# Patient Record
Sex: MALE | Race: WHITE | NOT HISPANIC OR LATINO
[De-identification: names, ages, dates, MRNs, and addresses within clinical notes are randomized per-mention and may not be internally consistent; named-entity substitution may affect disease eponyms.]

---

## 2017-04-18 ENCOUNTER — APPOINTMENT (OUTPATIENT)
Dept: VASCULAR SURGERY | Facility: CLINIC | Age: 67
End: 2017-04-18
Payer: MEDICARE

## 2017-04-18 VITALS — SYSTOLIC BLOOD PRESSURE: 135 MMHG | HEART RATE: 91 BPM | OXYGEN SATURATION: 95 % | DIASTOLIC BLOOD PRESSURE: 78 MMHG

## 2017-04-18 PROCEDURE — 99214 OFFICE O/P EST MOD 30 MIN: CPT | Mod: 25

## 2017-04-18 PROCEDURE — 93978 VASCULAR STUDY: CPT

## 2017-06-05 ENCOUNTER — FORM ENCOUNTER (OUTPATIENT)
Age: 67
End: 2017-06-05

## 2017-06-06 ENCOUNTER — OUTPATIENT (OUTPATIENT)
Dept: OUTPATIENT SERVICES | Facility: HOSPITAL | Age: 67
LOS: 1 days | End: 2017-06-06
Payer: MEDICARE

## 2017-06-06 ENCOUNTER — APPOINTMENT (OUTPATIENT)
Dept: NEUROSURGERY | Facility: CLINIC | Age: 67
End: 2017-06-06

## 2017-06-06 VITALS
SYSTOLIC BLOOD PRESSURE: 137 MMHG | DIASTOLIC BLOOD PRESSURE: 89 MMHG | HEART RATE: 89 BPM | OXYGEN SATURATION: 95 % | WEIGHT: 215 LBS | BODY MASS INDEX: 32.58 KG/M2 | HEIGHT: 68 IN

## 2017-06-06 DIAGNOSIS — Z95.1 PRESENCE OF AORTOCORONARY BYPASS GRAFT: Chronic | ICD-10-CM

## 2017-06-06 PROCEDURE — 72100 X-RAY EXAM L-S SPINE 2/3 VWS: CPT

## 2017-06-06 PROCEDURE — 72100 X-RAY EXAM L-S SPINE 2/3 VWS: CPT | Mod: 26

## 2017-06-15 ENCOUNTER — FORM ENCOUNTER (OUTPATIENT)
Age: 67
End: 2017-06-15

## 2017-06-16 ENCOUNTER — OUTPATIENT (OUTPATIENT)
Dept: OUTPATIENT SERVICES | Facility: HOSPITAL | Age: 67
LOS: 1 days | End: 2017-06-16
Payer: MEDICARE

## 2017-06-16 DIAGNOSIS — Z95.1 PRESENCE OF AORTOCORONARY BYPASS GRAFT: Chronic | ICD-10-CM

## 2017-06-16 PROCEDURE — 72131 CT LUMBAR SPINE W/O DYE: CPT | Mod: 26

## 2017-06-16 PROCEDURE — 72131 CT LUMBAR SPINE W/O DYE: CPT

## 2017-06-20 VITALS
OXYGEN SATURATION: 98 % | WEIGHT: 199.96 LBS | TEMPERATURE: 98 F | RESPIRATION RATE: 16 BRPM | HEART RATE: 73 BPM | HEIGHT: 68 IN | SYSTOLIC BLOOD PRESSURE: 136 MMHG | DIASTOLIC BLOOD PRESSURE: 63 MMHG

## 2017-06-20 NOTE — PATIENT PROFILE ADULT. - PMH
Bladder cancer    Coronary artery disease    Hyperlipidemia    Hypertension    PAD (peripheral artery disease)    Prostate cancer Bladder cancer    Coronary artery disease    Hyperlipidemia    Hypertension    PAD (peripheral artery disease)    Prostate cancer  Radiation [place]

## 2017-06-20 NOTE — PATIENT PROFILE ADULT. - PSH
S/P CABG (coronary artery bypass graft) H/O laminectomy  x 4 yrs  S/P CABG (coronary artery bypass graft)  2003  Surgery, elective  Peripheral stents in both legs 2 -left, 1- right

## 2017-06-21 ENCOUNTER — APPOINTMENT (OUTPATIENT)
Dept: NEUROSURGERY | Facility: HOSPITAL | Age: 67
End: 2017-06-21

## 2017-06-21 ENCOUNTER — INPATIENT (INPATIENT)
Facility: HOSPITAL | Age: 67
LOS: 0 days | Discharge: HOME | DRG: 520 | End: 2017-06-22
Attending: NEUROLOGICAL SURGERY | Admitting: NEUROLOGICAL SURGERY
Payer: MEDICARE

## 2017-06-21 DIAGNOSIS — Z95.1 PRESENCE OF AORTOCORONARY BYPASS GRAFT: Chronic | ICD-10-CM

## 2017-06-21 DIAGNOSIS — Z41.9 ENCOUNTER FOR PROCEDURE FOR PURPOSES OTHER THAN REMEDYING HEALTH STATE, UNSPECIFIED: Chronic | ICD-10-CM

## 2017-06-21 DIAGNOSIS — Z98.890 OTHER SPECIFIED POSTPROCEDURAL STATES: Chronic | ICD-10-CM

## 2017-06-21 PROCEDURE — 63047 LAM FACETEC & FORAMOT LUMBAR: CPT | Mod: 80

## 2017-06-21 PROCEDURE — 63047 LAM FACETEC & FORAMOT LUMBAR: CPT

## 2017-06-21 RX ORDER — ATORVASTATIN CALCIUM 80 MG/1
40 TABLET, FILM COATED ORAL AT BEDTIME
Qty: 0 | Refills: 0 | Status: DISCONTINUED | OUTPATIENT
Start: 2017-06-21 | End: 2017-06-22

## 2017-06-21 RX ORDER — HYDROMORPHONE HYDROCHLORIDE 2 MG/ML
30 INJECTION INTRAMUSCULAR; INTRAVENOUS; SUBCUTANEOUS
Qty: 0 | Refills: 0 | Status: DISCONTINUED | OUTPATIENT
Start: 2017-06-21 | End: 2017-06-21

## 2017-06-21 RX ORDER — IBUPROFEN 200 MG
600 TABLET ORAL EVERY 8 HOURS
Qty: 0 | Refills: 0 | Status: DISCONTINUED | OUTPATIENT
Start: 2017-06-21 | End: 2017-06-22

## 2017-06-21 RX ORDER — ONDANSETRON 8 MG/1
4 TABLET, FILM COATED ORAL EVERY 6 HOURS
Qty: 0 | Refills: 0 | Status: DISCONTINUED | OUTPATIENT
Start: 2017-06-21 | End: 2017-06-22

## 2017-06-21 RX ORDER — HYDROMORPHONE HYDROCHLORIDE 2 MG/ML
0.5 INJECTION INTRAMUSCULAR; INTRAVENOUS; SUBCUTANEOUS
Qty: 0 | Refills: 0 | Status: DISCONTINUED | OUTPATIENT
Start: 2017-06-21 | End: 2017-06-21

## 2017-06-21 RX ORDER — BUPIVACAINE 13.3 MG/ML
20 INJECTION, SUSPENSION, LIPOSOMAL INFILTRATION ONCE
Qty: 0 | Refills: 0 | Status: DISCONTINUED | OUTPATIENT
Start: 2017-06-21 | End: 2017-06-22

## 2017-06-21 RX ORDER — CYCLOBENZAPRINE HYDROCHLORIDE 10 MG/1
5 TABLET, FILM COATED ORAL THREE TIMES A DAY
Qty: 0 | Refills: 0 | Status: DISCONTINUED | OUTPATIENT
Start: 2017-06-21 | End: 2017-06-22

## 2017-06-21 RX ORDER — ACETAMINOPHEN 500 MG
650 TABLET ORAL EVERY 6 HOURS
Qty: 0 | Refills: 0 | Status: DISCONTINUED | OUTPATIENT
Start: 2017-06-21 | End: 2017-06-22

## 2017-06-21 RX ORDER — NALOXONE HYDROCHLORIDE 4 MG/.1ML
0.1 SPRAY NASAL
Qty: 0 | Refills: 0 | Status: DISCONTINUED | OUTPATIENT
Start: 2017-06-21 | End: 2017-06-22

## 2017-06-21 RX ORDER — VALSARTAN 80 MG/1
320 TABLET ORAL DAILY
Qty: 0 | Refills: 0 | Status: DISCONTINUED | OUTPATIENT
Start: 2017-06-21 | End: 2017-06-22

## 2017-06-21 RX ORDER — ONDANSETRON 8 MG/1
4 TABLET, FILM COATED ORAL EVERY 6 HOURS
Qty: 0 | Refills: 0 | Status: DISCONTINUED | OUTPATIENT
Start: 2017-06-21 | End: 2017-06-21

## 2017-06-21 RX ORDER — ALPRAZOLAM 0.25 MG
1 TABLET ORAL DAILY
Qty: 0 | Refills: 0 | Status: DISCONTINUED | OUTPATIENT
Start: 2017-06-21 | End: 2017-06-22

## 2017-06-21 RX ORDER — CYCLOBENZAPRINE HYDROCHLORIDE 10 MG/1
5 TABLET, FILM COATED ORAL THREE TIMES A DAY
Qty: 0 | Refills: 0 | Status: DISCONTINUED | OUTPATIENT
Start: 2017-06-21 | End: 2017-06-21

## 2017-06-21 RX ORDER — NALOXONE HYDROCHLORIDE 4 MG/.1ML
0.1 SPRAY NASAL
Qty: 0 | Refills: 0 | Status: DISCONTINUED | OUTPATIENT
Start: 2017-06-21 | End: 2017-06-21

## 2017-06-21 RX ORDER — HYDROMORPHONE HYDROCHLORIDE 2 MG/ML
30 INJECTION INTRAMUSCULAR; INTRAVENOUS; SUBCUTANEOUS
Qty: 0 | Refills: 0 | Status: DISCONTINUED | OUTPATIENT
Start: 2017-06-21 | End: 2017-06-22

## 2017-06-21 RX ORDER — PANTOPRAZOLE SODIUM 20 MG/1
40 TABLET, DELAYED RELEASE ORAL
Qty: 0 | Refills: 0 | Status: DISCONTINUED | OUTPATIENT
Start: 2017-06-21 | End: 2017-06-22

## 2017-06-21 RX ORDER — DOCUSATE SODIUM 100 MG
100 CAPSULE ORAL THREE TIMES A DAY
Qty: 0 | Refills: 0 | Status: DISCONTINUED | OUTPATIENT
Start: 2017-06-21 | End: 2017-06-22

## 2017-06-21 RX ORDER — HYDROMORPHONE HYDROCHLORIDE 2 MG/ML
0.5 INJECTION INTRAMUSCULAR; INTRAVENOUS; SUBCUTANEOUS
Qty: 0 | Refills: 0 | Status: DISCONTINUED | OUTPATIENT
Start: 2017-06-21 | End: 2017-06-22

## 2017-06-21 RX ORDER — VANCOMYCIN HCL 1 G
1000 VIAL (EA) INTRAVENOUS ONCE
Qty: 0 | Refills: 0 | Status: COMPLETED | OUTPATIENT
Start: 2017-06-22 | End: 2017-06-22

## 2017-06-21 RX ORDER — SENNA PLUS 8.6 MG/1
2 TABLET ORAL AT BEDTIME
Qty: 0 | Refills: 0 | Status: DISCONTINUED | OUTPATIENT
Start: 2017-06-21 | End: 2017-06-22

## 2017-06-21 RX ORDER — MAGNESIUM HYDROXIDE 400 MG/1
30 TABLET, CHEWABLE ORAL EVERY 12 HOURS
Qty: 0 | Refills: 0 | Status: DISCONTINUED | OUTPATIENT
Start: 2017-06-21 | End: 2017-06-22

## 2017-06-21 RX ORDER — AMLODIPINE BESYLATE 2.5 MG/1
10 TABLET ORAL DAILY
Qty: 0 | Refills: 0 | Status: DISCONTINUED | OUTPATIENT
Start: 2017-06-21 | End: 2017-06-22

## 2017-06-21 RX ORDER — SODIUM CHLORIDE 9 MG/ML
1000 INJECTION INTRAMUSCULAR; INTRAVENOUS; SUBCUTANEOUS
Qty: 0 | Refills: 0 | Status: DISCONTINUED | OUTPATIENT
Start: 2017-06-21 | End: 2017-06-22

## 2017-06-21 RX ADMIN — Medication 600 MILLIGRAM(S): at 22:19

## 2017-06-21 RX ADMIN — Medication 100 MILLIGRAM(S): at 22:18

## 2017-06-21 RX ADMIN — SENNA PLUS 2 TABLET(S): 8.6 TABLET ORAL at 22:19

## 2017-06-21 RX ADMIN — SODIUM CHLORIDE 75 MILLILITER(S): 9 INJECTION INTRAMUSCULAR; INTRAVENOUS; SUBCUTANEOUS at 17:23

## 2017-06-21 RX ADMIN — Medication 600 MILLIGRAM(S): at 23:00

## 2017-06-21 RX ADMIN — HYDROMORPHONE HYDROCHLORIDE 30 MILLILITER(S): 2 INJECTION INTRAMUSCULAR; INTRAVENOUS; SUBCUTANEOUS at 17:30

## 2017-06-21 NOTE — H&P PST ADULT - ASSESSMENT
65 y/o male presenting with severe LBP and RLE with difficulty walking.  Here electively for L4-L5 laminectomy.    1)  consent signed by patient and attending in chart  2)  pre-op medical clearance intact  3)  will continue home meds for HTN, HLD  4)  pain management to follow post op  5)  mechanical DVT prophylaxis

## 2017-06-21 NOTE — CONSULT NOTE ADULT - SUBJECTIVE AND OBJECTIVE BOX
Pre-surgical Pain Inventory    Surgery: L45 lami    Surgeon: Dr. Alvarez    PAST MEDICAL & SURGICAL HISTORY:  Prostate cancer: Radiation [place]  Bladder cancer  Coronary artery disease  PAD (peripheral artery disease)  Hyperlipidemia  Hypertension  H/O laminectomy: x 4 yrs  Surgery, elective: Peripheral stents in both legs 2 -left, 1- right  S/P CABG (coronary artery bypass graft): 2003  H/O total cystectomy    Social Hx:   Pt is an active smoker 1ppd  Pt states he drinks socially, has not had a drink in 2-3mos  Pt denies illicit substances use  Pt is a retired     Current Pre-op Pain Medications  Pt denies current use of pain medications    Prescribed by:  Pain Management--[place]  --Bhavin    Past Pain Medication:   [X] Oxycodone  - Oxycontin 10 BID  [X] Hydrocodone  [] Codeine  - Tylenol #2   - Tylenol #3  [] Methadone  [X] Fentanyl  - 10 patches recently prescribed, pt denies current use--states minimally effective  [X] Dilaudid  [X] Morphine  [X] Tramadol  - Prescribed Tramadol XR   [] NSAIDs/Anti-inflammatories  - Motrin IB  - Ibuprofen  [] Medrol/Decadron  [] Muscle relaxants  --- Denies use  [] Lyrica/Gabapentin  -- Denies use    Allergies  penicillin (Hives)    Intolerance    Vital Signs:  Vital Signs Last 24 Hrs  T(C): 36.8, Max: 36.8 (06-20 @ 14:21)  T(F): 98.3, Max: 98.3 (06-20 @ 14:21)  HR: 73 (73 - 73)  BP: 136/63 (136/63 - 136/63)  BP(mean): --  RR: 16 (16 - 16)  SpO2: 98% (98% - 98%)    Labs:       REVIEW OF SYSTEMS:  CONSTITUTIONAL: No fever or fatigue  EYES: No eye pain, visual disturbances  ENMT:  No difficulty hearing, tinnitus, vertigo  NECK: No pain or stiffness  RESPIRATORY: No cough, wheezing; No shortness of breath  CARDIOVASCULAR: No chest pain. Triple bypass surgery in 2003. Extensive CV history. Pt states that he had a recent hx of angina workup, but was negative. Pt was thought to have some sort of sprain/strain.  GASTROINTESTINAL: No difficulties with constipation. No abdominal or epigastric pain. No nausea, vomiting, or hematemesis; No diarrhea or constipation. GENITOURINARY: No dysuria, frequency, hematuria, or incontinence  NEUROLOGICAL: Pt reports + hx of LBP and radiculopathy into b/l buttocks and legs. Pt reports associated weakness and muscle spasms in calves. Pt states that he recently had a fall while walking in the [place], this time his L leg, and has recently had progressive LOS in LE. States that neither leg is weaker, but that it waxes and wanes between legs. No headaches, memory loss, loss of strength in UE, numbness, or tremors at rest.   SKIN: No itching, burning, rashes, or lesions   MUSCULOSKELETAL: + LBP w/ radiculopathy.  PSYCHIATRIC: No depression, anxiety, mood swings, or difficulty sleeping    FUNCTIONAL ASSESSMENT:  Pt denies using walker/cane despite recent LOS. States he is too "proud." Pt reports high level of pain tolerance but that pain is persistent and debilitating.     AVERAGE DAILY PAIN SCORE: 7/10    PAIN ASSESSMENT:  Pt reports hx of LBP severe with sharp intermittent radicular pain into b/l buttocks and anterior thighs. Pt reports associated muscle cramping. Denies associated numbness/tingling/electric shock like sensation. States pain is alleviated at rest, stooped forward position and reports an associated shuffling gait. Pt reports symptoms of neurogenic claudication. Pt reports minimal relief of pain with pain medications, extensive hx of opioid trials as above.     PHYSICAL EXAM  GENERAL: NAD   HEAD:  Atraumatic, Normocephalic  EYES: EOMI, PERRLA, conjunctiva and sclera clear  NECK: Supple  NERVOUS SYSTEM:    Alert & Oriented X3, Good concentration;   Cranial nerves grossly intact  Motor Strength 5/5 B/L upper extremities, no restrictions in ROM, 5-/5 in LE b/l.   Sensation intact to LT in UE/LE in 3 dermatomes  Cervical: No facet tenderness. Negative Spurlings sign. Negative Reynoso's sign.   Lumbar: No lumbar tenderness. Negative straight leg raise. Negative crossed straight leg raise.   CHEST/LUNG: Clear to auscultation bilaterally; No rales, rhonchi, wheezing, or rubs  HEART: Regular rate and rhythm; No murmurs, rubs, or gallops  ABDOMEN: Soft, Nontender, Nondistended; Bowel sounds present  EXTREMITIES:  2+ Peripheral Pulses, No clubbing, cyanosis, or edema  SKIN: No rashes or lesions    Assessment:   67yo M w/ c/o LBP with radiculopathy and plan for L45 lami    Plan:   - As d/w Dr. Martínez Almaraz    Immediate post-op plan  - Given pts extensive cardiac hx, decision made to start on PCA for pre-emptive pain control, so as not to exacerbate CVS w/ elevated BP associated with poor pain control. Pt has extensive opioid hx--most recently was on Tramadol, would prefer not to restart given CV hx.     - PCA Settings:  	Opioid: Dilaudid  Initial Bolus: 0.0  	Initial Demand:0.2mg  	Lockout:8min  	Continuous Rate:0.0  	4 hour limit: 5mg  - Clinician bolus: 0.5mg q30min for Pain >8/10  - Flexeril 5mg TID PRN muscle spasms  	  Rescue plan  - Clinician bolus, as above    Tentative floor plan  - PO Percocet  - Flexeril  - IV Tylenol

## 2017-06-21 NOTE — H&P PST ADULT - HISTORY OF PRESENT ILLNESS
65 Y/O male presenting with LBP and RLE pain.  Ambulations is very painful. Has history of prior L3-L4 laminectomy.  He has failed conservative measures.  Denies bowel/bladder dysfunction.

## 2017-06-21 NOTE — H&P PST ADULT - PSH
H/O laminectomy  x 4 yrs  S/P CABG (coronary artery bypass graft)  2003  Surgery, elective  Peripheral stents in both legs 2 -left, 1- right

## 2017-06-21 NOTE — H&P PST ADULT - PMH
Bladder cancer    Coronary artery disease    Hyperlipidemia    Hypertension    PAD (peripheral artery disease)    Prostate cancer  Radiation [place]

## 2017-06-21 NOTE — PROGRESS NOTE ADULT - SUBJECTIVE AND OBJECTIVE BOX
Neurosurgery POC    S/P L4-5 laminectomy. no intra op complications. Patient is in PACU without complaint. Denies back , leg pain. Admits that RLE numbness is resolved.      T(C): 36.5, Max: 36.5 (06-21 @ 16:38)  HR: 80 (74 - 80)  BP: 114/70 (111/60 - 126/56)  RR: 16 (16 - 16)  SpO2: 97% (97% - 97%)  Wt(kg): --    Exam: A&O x 3, PERRL, EOMI  Lung: good air entry, clear lung sound    Heart: S1S2, regular  Back: surgical dressing is dry and clean, No edema, no hematoma.  Ext: no focal motor deficit. 5/5 x 4  No sensory deficit to touch.  Assessment: 66 male post L4-5 laminotomy Doing well.  Plan send to floor on call  OOB ambulate  Regular diet  Discharge tomorrow  D/W Dr. Alvarez.                  Wound:    Imaging:    Assessment/Plan:

## 2017-06-22 ENCOUNTER — TRANSCRIPTION ENCOUNTER (OUTPATIENT)
Age: 67
End: 2017-06-22

## 2017-06-22 VITALS
SYSTOLIC BLOOD PRESSURE: 151 MMHG | RESPIRATION RATE: 16 BRPM | HEART RATE: 90 BPM | OXYGEN SATURATION: 96 % | DIASTOLIC BLOOD PRESSURE: 81 MMHG | TEMPERATURE: 97 F

## 2017-06-22 LAB
ANION GAP SERPL CALC-SCNC: 14 MMOL/L — SIGNIFICANT CHANGE UP (ref 5–17)
BASOPHILS NFR BLD AUTO: 0.1 % — SIGNIFICANT CHANGE UP (ref 0–2)
BUN SERPL-MCNC: 10 MG/DL — SIGNIFICANT CHANGE UP (ref 7–23)
CALCIUM SERPL-MCNC: 9.4 MG/DL — SIGNIFICANT CHANGE UP (ref 8.4–10.5)
CHLORIDE SERPL-SCNC: 104 MMOL/L — SIGNIFICANT CHANGE UP (ref 96–108)
CO2 SERPL-SCNC: 22 MMOL/L — SIGNIFICANT CHANGE UP (ref 22–31)
CREAT SERPL-MCNC: 0.8 MG/DL — SIGNIFICANT CHANGE UP (ref 0.5–1.3)
EOSINOPHIL NFR BLD AUTO: 0.1 % — SIGNIFICANT CHANGE UP (ref 0–6)
GLUCOSE SERPL-MCNC: 114 MG/DL — HIGH (ref 70–99)
HCT VFR BLD CALC: 40.8 % — SIGNIFICANT CHANGE UP (ref 39–50)
HGB BLD-MCNC: 13.5 G/DL — SIGNIFICANT CHANGE UP (ref 13–17)
LYMPHOCYTES # BLD AUTO: 12.6 % — LOW (ref 13–44)
MCHC RBC-ENTMCNC: 30.6 PG — SIGNIFICANT CHANGE UP (ref 27–34)
MCHC RBC-ENTMCNC: 33.1 G/DL — SIGNIFICANT CHANGE UP (ref 32–36)
MCV RBC AUTO: 92.5 FL — SIGNIFICANT CHANGE UP (ref 80–100)
MONOCYTES NFR BLD AUTO: 6.9 % — SIGNIFICANT CHANGE UP (ref 2–14)
NEUTROPHILS NFR BLD AUTO: 80.3 % — HIGH (ref 43–77)
PLATELET # BLD AUTO: 171 K/UL — SIGNIFICANT CHANGE UP (ref 150–400)
POTASSIUM SERPL-MCNC: 4.4 MMOL/L — SIGNIFICANT CHANGE UP (ref 3.5–5.3)
POTASSIUM SERPL-SCNC: 4.4 MMOL/L — SIGNIFICANT CHANGE UP (ref 3.5–5.3)
RBC # BLD: 4.41 M/UL — SIGNIFICANT CHANGE UP (ref 4.2–5.8)
RBC # FLD: 13.6 % — SIGNIFICANT CHANGE UP (ref 10.3–16.9)
SODIUM SERPL-SCNC: 140 MMOL/L — SIGNIFICANT CHANGE UP (ref 135–145)
WBC # BLD: 9.1 K/UL — SIGNIFICANT CHANGE UP (ref 3.8–10.5)
WBC # FLD AUTO: 9.1 K/UL — SIGNIFICANT CHANGE UP (ref 3.8–10.5)

## 2017-06-22 RX ORDER — IBUPROFEN 200 MG
1 TABLET ORAL
Qty: 24 | Refills: 0 | OUTPATIENT
Start: 2017-06-22 | End: 2017-06-28

## 2017-06-22 RX ORDER — IBUPROFEN 200 MG
1 TABLET ORAL
Qty: 84 | Refills: 0 | OUTPATIENT
Start: 2017-06-22 | End: 2017-07-13

## 2017-06-22 RX ORDER — SENNOSIDES/DOCUSATE SODIUM 8.6MG-50MG
2 TABLET ORAL
Qty: 28 | Refills: 0 | OUTPATIENT
Start: 2017-06-22 | End: 2017-06-29

## 2017-06-22 RX ORDER — ACETAMINOPHEN 500 MG
2 TABLET ORAL
Qty: 0 | Refills: 0 | COMMUNITY
Start: 2017-06-22

## 2017-06-22 RX ORDER — OXYCODONE HYDROCHLORIDE 5 MG/1
1 TABLET ORAL
Qty: 28 | Refills: 0 | OUTPATIENT
Start: 2017-06-22 | End: 2017-06-29

## 2017-06-22 RX ORDER — IBUPROFEN 200 MG
600 TABLET ORAL EVERY 8 HOURS
Qty: 0 | Refills: 0 | Status: DISCONTINUED | OUTPATIENT
Start: 2017-06-22 | End: 2017-06-22

## 2017-06-22 RX ORDER — CYCLOBENZAPRINE HYDROCHLORIDE 10 MG/1
1 TABLET, FILM COATED ORAL
Qty: 21 | Refills: 0 | OUTPATIENT
Start: 2017-06-22 | End: 2017-06-29

## 2017-06-22 RX ORDER — OXYCODONE HYDROCHLORIDE 5 MG/1
1 TABLET ORAL
Qty: 0 | Refills: 0 | COMMUNITY

## 2017-06-22 RX ORDER — IBUPROFEN 200 MG
1 TABLET ORAL
Qty: 0 | Refills: 0 | COMMUNITY

## 2017-06-22 RX ORDER — ACETAMINOPHEN WITH CODEINE 300MG-30MG
1 TABLET ORAL
Qty: 0 | Refills: 0 | COMMUNITY

## 2017-06-22 RX ADMIN — PANTOPRAZOLE SODIUM 40 MILLIGRAM(S): 20 TABLET, DELAYED RELEASE ORAL at 06:28

## 2017-06-22 RX ADMIN — Medication 100 MILLIGRAM(S): at 06:26

## 2017-06-22 RX ADMIN — AMLODIPINE BESYLATE 10 MILLIGRAM(S): 2.5 TABLET ORAL at 06:26

## 2017-06-22 RX ADMIN — SODIUM CHLORIDE 75 MILLILITER(S): 9 INJECTION INTRAMUSCULAR; INTRAVENOUS; SUBCUTANEOUS at 05:46

## 2017-06-22 RX ADMIN — Medication 600 MILLIGRAM(S): at 06:28

## 2017-06-22 RX ADMIN — VALSARTAN 320 MILLIGRAM(S): 80 TABLET ORAL at 06:26

## 2017-06-22 RX ADMIN — Medication 250 MILLIGRAM(S): at 05:00

## 2017-06-22 NOTE — DISCHARGE NOTE ADULT - CARE PLAN
Principal Discharge DX:	H/O laminectomy  Goal:	return home and regain your independent activity  Instructions for follow-up, activity and diet:	diet: Regular  Activity as tolerated  No heavy lifting anything greater than 10 pounds  No bending or twisting  Able to  and get the incision wet, Pat it dry with a clean towel  Call the office if you develop temperature greater than 101.5 degrees F or drainage from the wound  Once you are home call the office to make a follow up appt with Dr Alvarez  Take motrin for pain and antiinflammatory for next 3 weeks and in between your pain reliever as needed Principal Discharge DX:	H/O laminectomy  Goal:	return home and regain your independent activity  Instructions for follow-up, activity and diet:	Diet: Regular  Activity as tolerated  No heavy lifting anything greater than 10 pounds  No bending or twisting  Able to  and get the incision wet, Pat it dry with a clean towel  Call the office if you develop temperature greater than 101.5 degrees F or drainage from the wound  Once you are home call the office to make a follow up appt with Dr Alvarez  Take motrin for pain and antiinflammatory for next 3 weeks and in between your pain reliever as needed

## 2017-06-22 NOTE — DISCHARGE NOTE ADULT - HOSPITAL COURSE
7 Y/O male presenting with LBP and RLE pain.  Ambulations is very painful. Has history of prior L3-L4 laminectomy.  He has failed conservative measures.    Denies bowel/bladder dysfunction. Pt admitted and underwent on 6/21/2017 Laminectomy    l4-l5   Past Medical History:  Bladder cancer    Coronary artery disease    Hyperlipidemia    Hypertension    PAD (peripheral artery disease)    Prostate cancer  Radiation [place].    Past Surgical History:  H/O laminectomy  x 4 yrs  S/P CABG (coronary artery bypass graft)  2003  Surgery, elective  Peripheral stents in both legs 2 -left, 1- right. 7 Y/O male with PMHx sig for Bladder Ca, CAD, HLP, HTN, PAD, Prostate Ca, Laminectomy, s/p CABG, Peripheral stents in both legs, with LBP and RLE pain.  Hx of painful ambulation, with severe radicular pain and weakness.  He failed conservative measures. Denied bowel/bladder dysfunction. Pt was admitted and underwent on 6/21/2017 Laminectomy. Pt was seen by PT. Pt was discharged home with _____. Pt is discharged on Ibuprofen for 3 weeks. 7 Y/O male with PMHx sig for Bladder Ca, CAD, HLP, HTN, PAD, Prostate Ca, Laminectomy, s/p CABG, Peripheral stents in both legs, with LBP and RLE pain.  Hx of painful ambulation, with severe radicular pain and weakness.  He failed conservative measures. Denied bowel/bladder dysfunction. Pt was admitted and underwent on 6/21/2017 Laminectomy. Pt was seen by PT. Pt was discharged home with no needs. Pt is discharged on Ibuprofen for 3 weeks.

## 2017-06-22 NOTE — DISCHARGE NOTE ADULT - PATIENT PORTAL LINK FT
“You can access the FollowHealth Patient Portal, offered by [facility], by registering with the following website: [email or web address]”

## 2017-06-22 NOTE — PROGRESS NOTE ADULT - SUBJECTIVE AND OBJECTIVE BOX
NEUROSURGERY PAIN MANAGEMENT CONSULT NOTE    **INCOMPLETE NOTE**    HPI:  65 Y/O male presenting with LBP and RLE pain.  Ambulations is very painful. Has history of prior L3-L4 laminectomy.  He has failed conservative measures.  Denies bowel/bladder dysfunction. (21 Jun 2017 16:38)      PAST MEDICAL & SURGICAL HISTORY:  Prostate cancer: Radiation [place]  Bladder cancer  Coronary artery disease  PAD (peripheral artery disease)  Hyperlipidemia  Hypertension  H/O laminectomy: x 4 yrs  Surgery, elective: Peripheral stents in both legs 2 -left, 1- right  S/P CABG (coronary artery bypass graft): 2003  H/O total cystectomy      FAMILY HISTORY:      SOCIAL HISTORY:  [ ] Denies Smoking, Alcohol, or Drug Use    HOME MEDICATIONS:   Please refer to initial HNP    PAIN HOME MEDICATIONS:    Allergies    penicillin (Hives)    Intolerances        PAIN MEDICATIONS:  HYDROmorphone PCA (1 mG/mL) Rescue Clinician Bolus 0.5milliGRAM(s) IV Push every 30 minutes PRN  ondansetron Injectable 4milliGRAM(s) IV Push every 6 hours PRN  cyclobenzaprine 5milliGRAM(s) Oral three times a day PRN  ALPRAZolam 1milliGRAM(s) Oral daily PRN  acetaminophen   Tablet 650milliGRAM(s) Oral every 6 hours PRN  ondansetron Injectable 4milliGRAM(s) IV Push every 6 hours PRN  ibuprofen  Tablet 600milliGRAM(s) Oral every 8 hours  oxyCODONE  5 mG/acetaminophen 325 mG 2Tablet(s) Oral every 6 hours PRN  oxyCODONE  5 mG/acetaminophen 325 mG 1Tablet(s) Oral every 4 hours PRN    Heme:    Antibiotics:    Cardiovascular:  amLODIPine   Tablet 10milliGRAM(s) Oral daily  valsartan 320milliGRAM(s) Oral daily    GI:  pantoprazole    Tablet 40milliGRAM(s) Oral before breakfast  docusate sodium 100milliGRAM(s) Oral three times a day  magnesium hydroxide Suspension 30milliLiter(s) Oral every 12 hours PRN  senna 2Tablet(s) Oral at bedtime    Endocrine:  atorvastatin 40milliGRAM(s) Oral at bedtime    All Other Medications:  BUpivacaine liposome 1.3% Injectable (no eMAR) 20milliLiter(s) Local Injection once  sodium chloride 0.9%. 1000milliLiter(s) IV Continuous <Continuous>      Vital Signs Last 24 Hrs  T(C): 36.5, Max: 36.5 (06-21 @ 16:38)  T(F): 97.7, Max: 97.7 (06-21 @ 16:38)  HR: 77 (70 - 84)  BP: 157/90 (111/60 - 157/90)  BP(mean): --  RR: 16 (15 - 16)  SpO2: 98% (94% - 98%)    LABS:                RADIOLOGY:    Drug Screen:        REVIEW OF SYSTEMS:    CONSTITUTIONAL: No fever, weight loss, or fatigue  EYES: No eye pain, visual disturbances, or discharge  ENMT:  No difficulty hearing, tinnitus, vertigo; No sinus or throat pain  NECK: No pain or stiffness  BREASTS: No pain, masses, or nipple discharge  RESPIRATORY: No cough, wheezing, chills or hemoptysis; No shortness of breath  CARDIOVASCULAR: No chest pain, palpitations, dizziness, or leg swelling  GASTROINTESTINAL: No abdominal or epigastric pain. No nausea, vomiting, or hematemesis; No diarrhea or constipation. No melena or hematochezia.  GENITOURINARY: No dysuria, frequency, hematuria, or incontinence  NEUROLOGICAL: No headaches, memory loss, loss of strength, numbness, or tremors  SKIN: No itching, burning, rashes, or lesions   LYMPH NODES: No enlarged glands  ENDOCRINE: No heat or cold intolerance; No hair loss  MUSCULOSKELETAL: No joint pain or swelling; No muscle, back, or extremity pain  PSYCHIATRIC: No depression, anxiety, mood swings, or difficulty sleeping  HEME/LYMPH: No easy bruising, or bleeding gums  ALLERY AND IMMUNOLOGIC: No hives or eczema      FUNCTIONAL ASSESSMENT:  PAIN SCORE AT REST:         SCALE USED: (1-10 VNRS)  Comment:  PAIN SCORE WITH ACTIVITY:         SCALE USED: (1-10 VNRS)  Comment:    PAIN ASSESSMENT:    PHYSICAL EXAM  GENERAL: NAD, well-groomed, well-developed  HEAD:  Atraumatic, Normocephalic  EYES: EOMI, PERRLA, conjunctiva and sclera clear  NECK: Supple, ___ collar  NERVOUS SYSTEM:    Alert & Oriented X3, Good concentration;   Cranial nerves grossly intact  Motor Strength 5/5 B/L upper and lower extremities;   Sensation intact to LT in UE/LE in 3 dermatomes    Cervical: No facet tenderness. Negative Spurlings sign. Negative Reynoso's sign. Negative Brudzinski's sign. Negative Kernig's sign. Cervical ROM not assessed, s/p surgery and restricted turning.  ROM  Cervical flexion: 50  Right lateral flexion: 45  Left lateral flexion: 45  Extension: 60  Left rotation: 80  Right rotation: 80    Lumbar: No lumbar tenderness. Negative straight leg raise. Negative crossed straight leg raise. Negative David's sign. Negative facet loading maneuvers. Lumbar ROM not assessed, s/p surgery and restricted turning.  ROM  Trunk extension: 25  Trunk flexion: 90  Right rotation: 25  Left rotation: 25    CHEST/LUNG: Clear to auscultation bilaterally; No rales, rhonchi, wheezing, or rubs  HEART: Regular rate and rhythm; No murmurs, rubs, or gallops  ABDOMEN: Soft, Nontender, Nondistended; Bowel sounds present  EXTREMITIES:  2+ Peripheral Pulses, No clubbing, cyanosis, or edema  SKIN: No rashes or lesions      ASSESSMENT:       PLAN:   1. Opioids    Since yesterday 6am, pt has required:     PCA Setting:   - Opioids:   - Initial Bolus:   - Initial Demand:   - Lockout:   - Continuous Rate:   - 4 hour limit:     PLAN: C/w . Transition to . LA not required. Pt receiving adequete coverage. First step. For rescue dosing, will order .      2. Neuropathics  Pt currently denies neuropathic pain. No numbness/tingling/electric shock like sensation in LE/UE b.l.    PLAN: No indication for neuropathic agents.     3. Adjuvants  Pt ___ complaining of muscle spasms/cramping in neck/back. Tylenol 650mg q6h PRN for Mild Pain. Pt on/not on Percocet.     PLAN: C/w     4. Prophylactic:   Bowel regimen: Docusate Senna;    Nausea PRN: Zofran PRN for Nausea, pt does not c/o current    5. Functional Goals:   Pt will get OOB with PT today. Pt will resume previous level of activity without impairment from surgery.     6. Additional Consults:   None recommended.     7. Additional Labs/Imaging:   None recommended.     8. Follow up, Discharge Planning:   Patient is set for discharge to:   Discharge is pending:   Pain Management follow up plan: NEUROSURGERY PAIN MANAGEMENT CONSULT NOTE    PLAN:  - Pain well controlled, PCA was started for concern for poor pain control and hx of XR/Fentanyl patch etc. which pt decided to dc himself  - Switched to PO Percocet, pt walked without need for PT--cleared    HPI:  65 Y/O male presenting with LBP and RLE pain.  Ambulations is very painful. Has history of prior L3-L4 laminectomy.  He has failed conservative measures.  Denies bowel/bladder dysfunction. (21 Jun 2017 16:38)    PAST MEDICAL & SURGICAL HISTORY:  Prostate cancer: Radiation [place]  Bladder cancer  Coronary artery disease  PAD (peripheral artery disease)  Hyperlipidemia  Hypertension  H/O laminectomy: x 4 yrs  Surgery, elective: Peripheral stents in both legs 2 -left, 1- right  S/P CABG (coronary artery bypass graft): 2003  H/O total cystectomy    SOCIAL HISTORY:  [ ] Denies Smoking, Alcohol, or Drug Use    HOME MEDICATIONS:   Please refer to initial HNP    PAIN HOME MEDICATIONS:  See prior note    Allergies    penicillin (Hives)    Intolerances    PAIN MEDICATIONS:  HYDROmorphone PCA (1 mG/mL) Rescue Clinician Bolus 0.5milliGRAM(s) IV Push every 30 minutes PRN  ondansetron Injectable 4milliGRAM(s) IV Push every 6 hours PRN  cyclobenzaprine 5milliGRAM(s) Oral three times a day PRN  ALPRAZolam 1milliGRAM(s) Oral daily PRN  acetaminophen   Tablet 650milliGRAM(s) Oral every 6 hours PRN  ondansetron Injectable 4milliGRAM(s) IV Push every 6 hours PRN  ibuprofen  Tablet 600milliGRAM(s) Oral every 8 hours  oxyCODONE  5 mG/acetaminophen 325 mG 2Tablet(s) Oral every 6 hours PRN  oxyCODONE  5 mG/acetaminophen 325 mG 1Tablet(s) Oral every 4 hours PRN    Heme:    Antibiotics:    Cardiovascular:  amLODIPine   Tablet 10milliGRAM(s) Oral daily  valsartan 320milliGRAM(s) Oral daily    GI:  pantoprazole    Tablet 40milliGRAM(s) Oral before breakfast  docusate sodium 100milliGRAM(s) Oral three times a day  magnesium hydroxide Suspension 30milliLiter(s) Oral every 12 hours PRN  senna 2Tablet(s) Oral at bedtime    Endocrine:  atorvastatin 40milliGRAM(s) Oral at bedtime    All Other Medications:  BUpivacaine liposome 1.3% Injectable (no eMAR) 20milliLiter(s) Local Injection once  sodium chloride 0.9%. 1000milliLiter(s) IV Continuous <Continuous>      Vital Signs Last 24 Hrs  T(C): 36.5, Max: 36.5 (06-21 @ 16:38)  T(F): 97.7, Max: 97.7 (06-21 @ 16:38)  HR: 77 (70 - 84)  BP: 157/90 (111/60 - 157/90)  BP(mean): --  RR: 16 (15 - 16)  SpO2: 98% (94% - 98%)    REVIEW OF SYSTEMS:  CONSTITUTIONAL: No fever, weight loss, or fatigue  EYES: No eye pain, visual disturbances  ENMT:  No difficulty hearing, tinnitus, vertigo  NECK: No pain or stiffness  RESPIRATORY: No cough, wheezing, chills or hemoptysis; No shortness of breath. Hx of smoking.  CARDIOVASCULAR: No chest pain, palpitations, dizziness w/ walking  GASTROINTESTINAL: Passing gas. No abdominal or epigastric pain. No nausea, vomiting, or hematemesis. No diarrhea.  GENITOURINARY: No dysuria, frequency, hematuria, or incontinence  NEUROLOGICAL: No headaches, memory loss, loss of strength, numbness, or tremors  SKIN: Dressing saturated. Changed dressing. No itching, burning.  MUSCULOSKELETAL: Minimal back discomfort. No joint pain or swelling; No muscle, back, or extremity pain. No return of pre-op radicular pain with walking/post-op.  PSYCHIATRIC: No difficulties sleeping      FUNCTIONAL ASSESSMENT:  PAIN SCORE AT REST:   Denies      SCALE USED: (1-10 VNRS)  PAIN SCORE WITH ACTIVITY:   Denies      SCALE USED: (1-10 VNRS)    PAIN ASSESSMENT:  Denies pain, states that he only feels some discomfort.     PHYSICAL EXAM  GENERAL: NAD, well-groomed, well-developed  HEAD:  Atraumatic, Normocephalic  EYES: EOMI, PERRLA, conjunctiva and sclera clear  NECK: Supple  NERVOUS SYSTEM:    Alert & Oriented X3, Good concentration;   Cranial nerves grossly intact  Motor Strength 5/5 B/L upper and lower extremities;   Sensation intact to LT in UE/LE in 3 dermatomes  Lumbar: No lumbar tenderness. Negative straight leg raise. Negative crossed straight leg raise.   EXTREMITIES:  2+ Peripheral Pulses.  SKIN: No rashes or lesions. Staples well approximated C/D/I. Dressing C/D/I--replaced today      ASSESSMENT:   67yo M s/p L45 lami POD 1    PLAN:   1. Opioids  Minimal use of PCA.     PLAN: Dc on Motrin 600mg q6h for 21 days. Percocet 5mg q6h PRN x 1 week. No need for additional medications. Pt sees Pain Management at [facility].    2. Neuropathics  Pt currently denies neuropathic pain. No numbness/tingling/electric shock like sensation in LE/UE b.l.    PLAN: No indication for neuropathic agents.     3. Adjuvants  Pt not complaining of muscle spasms/cramping in neck/back.     PLAN: Dcd with 1 week of Flexeril.    4. Prophylactic:   Bowel regimen: Docusate Senna; Dcd Docusate-Senna  Nausea PRN: Zofran PRN for Nausea, pt does not c/o current    5. Functional Goals:   Pt will get OOB with PT today. Pt will resume previous level of activity without impairment from surgery.     6. Additional Consults:   None recommended.     7. Additional Labs/Imaging:   None recommended.     8. Follow up, Discharge Planning:   Patient is set for discharge to: Home  Discharge is pending: Home  Pain Management follow up plan: No need for follow up.

## 2017-06-22 NOTE — DISCHARGE NOTE ADULT - MEDICATION SUMMARY - MEDICATIONS TO TAKE
I will START or STAY ON the medications listed below when I get home from the hospital:    acetaminophen 325 mg oral tablet  -- 2 tab(s) by mouth every 6 hours, As needed, For Temp greater than 38 C (100.4 F)  -- Indication: For Pain    acetaminophen-oxycodone 325 mg-5 mg oral tablet  -- 1 tab(s) by mouth every 6 hours, As Needed -for severe pain MDD:4  -- Indication: For Pain (Severe)    ibuprofen 600 mg oral tablet  -- 1 tab(s) by mouth every 6 hours  -- Indication: For Anti-inflammatory    Aspirin Enteric Coated 81 mg oral delayed release tablet  -- 1 tab(s) by mouth once a day  -- Start this on 6/23  -- Indication: For H/O triple bypass    atorvastatin 40 mg oral tablet  -- 1 tab(s) by mouth once a day (at bedtime)  -- Indication: For Hyperlipidemia    Exforge 10 mg-320 mg oral tablet  -- 1 tab(s) by mouth once a day  -- Indication: For Hypertension    Xanax 1 mg oral tablet  -- 1 tab(s) by mouth once a day, As Needed  -- Indication: For Anxiety    docusate-senna 50 mg-8.6 mg oral tablet  -- 2 tab(s) by mouth 2 times a day, As Needed -for constipation  -- Medication should be taken with plenty of water.    -- Indication: For Constipation    cyclobenzaprine 5 mg oral tablet  -- 1 tab(s) by mouth 3 times a day, As needed, Muscle Spasm  -- Indication: For Muscle Spams    NexIUM 40 mg oral delayed release capsule  -- 1 cap(s) by mouth once a day  -- Indication: For GERD

## 2017-06-22 NOTE — DISCHARGE NOTE ADULT - CARE PROVIDER_API CALL
Martínez Almaraz), Anesthesiology; Pain Medicine  [address] [address]  Phone: [phone]  Fax: [phone]    Patricio Alvarez), Neurological Surgery  [address] [address]  Phone: [phone]  Fax: [phone]

## 2017-06-22 NOTE — PROGRESS NOTE ADULT - SUBJECTIVE AND OBJECTIVE BOX
HPI:  67 Y/O male presenting with LBP and RLE pain.  Ambulations is very painful. Has history of prior L3-L4 laminectomy.  He has failed conservative measures.  Denies bowel/bladder dysfunction. (21 Jun 2017 16:38)    OVERNIGHT EVENTS: No acute events overnight. Pt denies acute weakness/loss of sensation of extremities, fevers.    Vital Signs Last 24 Hrs  T(C): 36.3, Max: 36.5 (06-21 @ 16:38)  T(F): 97.4, Max: 97.7 (06-21 @ 16:38)  HR: 90 (70 - 90)  BP: 151/81 (111/60 - 157/90)  BP(mean): --  RR: 16 (15 - 16)  SpO2: 96% (94% - 98%)    I&O's Summary  I & Os for 24h ending 22 Jun 2017 07:00  =============================================  IN: 1000 ml / OUT: 1000 ml / NET: 0 ml    I & Os for current day (as of 22 Jun 2017 09:51)  =============================================  IN: 75 ml / OUT: 0 ml / NET: 75 ml      PHYSICAL EXAM:  Neurological: AAOx3, FC, speech coherent  CNII-XII: grossly intact  Motor: MAEx4 5/5 UE and LE B/L         [x] warm well perfused; capillary refill <3 seconds   Sensation: [x] intact to light touch  [] decreased:   Incision/Wound: back incision site derssing saturated with serosanguinous fluid, staples in place, no erythema/edema    TUBES/LINES:  [] Griffin  [] Lumbar Drain  [] Wound Drains  [] Others    DIET:  [] NPO  [x] Mechanical  [] Tube feeds    LABS:                        13.5   9.1   )-----------( 171      ( 22 Jun 2017 07:11 )             40.8     06-22    140  |  104  |  10  ----------------------------<  114<H>  4.4   |  22  |  0.80    Ca    9.4      22 Jun 2017 07:11    CAPILLARY BLOOD GLUCOSE      Drug Levels: [] N/A    CSF Analysis: [] N/A      Allergies    penicillin (Hives)    Intolerances      MEDICATIONS:  Antibiotics:    Neuro:  HYDROmorphone PCA (1 mG/mL) Rescue Clinician Bolus 0.5milliGRAM(s) IV Push every 30 minutes PRN  ondansetron Injectable 4milliGRAM(s) IV Push every 6 hours PRN  cyclobenzaprine 5milliGRAM(s) Oral three times a day PRN  ALPRAZolam 1milliGRAM(s) Oral daily PRN  acetaminophen   Tablet 650milliGRAM(s) Oral every 6 hours PRN  ondansetron Injectable 4milliGRAM(s) IV Push every 6 hours PRN  oxyCODONE  5 mG/acetaminophen 325 mG 2Tablet(s) Oral every 6 hours PRN  oxyCODONE  5 mG/acetaminophen 325 mG 1Tablet(s) Oral every 4 hours PRN  ibuprofen  Tablet 600milliGRAM(s) Oral every 8 hours    Anticoagulation:    OTHER:  atorvastatin 40milliGRAM(s) Oral at bedtime  amLODIPine   Tablet 10milliGRAM(s) Oral daily  valsartan 320milliGRAM(s) Oral daily  pantoprazole    Tablet 40milliGRAM(s) Oral before breakfast  docusate sodium 100milliGRAM(s) Oral three times a day  magnesium hydroxide Suspension 30milliLiter(s) Oral every 12 hours PRN  senna 2Tablet(s) Oral at bedtime    IVF:  sodium chloride 0.9%. 1000milliLiter(s) IV Continuous <Continuous>    CULTURES:    RADIOLOGY & ADDITIONAL TESTS:    ASSESSMENT:  66y Male s/p L4-L5 laminectomy for lumbar stenosis, POD#1    PLAN:  -Pain management  -Bowel regimen  -DVT prophylaxsis: SCDS  -PT/OOB  -Diet regular  -Remove incision site dressing  -D/w Dr. Alvarez

## 2017-06-22 NOTE — PHYSICAL THERAPY INITIAL EVALUATION ADULT - PLANNED THERAPY INTERVENTIONS, PT EVAL
Patient is cleared from PT program. Demonstrated functional independence with all mobility. Patient in agreement.

## 2017-06-22 NOTE — DISCHARGE NOTE ADULT - PLAN OF CARE
return home and regain your independent activity diet: Regular  Activity as tolerated  No heavy lifting anything greater than 10 pounds  No bending or twisting  Able to  and get the incision wet, Pat it dry with a clean towel  Call the office if you develop temperature greater than 101.5 degrees F or drainage from the wound  Once you are home call the office to make a follow up appt with Dr Alvarez  Take motrin for pain and antiinflammatory for next 3 weeks and in between your pain reliever as needed Diet: Regular  Activity as tolerated  No heavy lifting anything greater than 10 pounds  No bending or twisting  Able to  and get the incision wet, Pat it dry with a clean towel  Call the office if you develop temperature greater than 101.5 degrees F or drainage from the wound  Once you are home call the office to make a follow up appt with Dr Alvarez  Take motrin for pain and antiinflammatory for next 3 weeks and in between your pain reliever as needed

## 2017-06-23 PROCEDURE — 86901 BLOOD TYPING SEROLOGIC RH(D): CPT

## 2017-06-23 PROCEDURE — 80048 BASIC METABOLIC PNL TOTAL CA: CPT

## 2017-06-23 PROCEDURE — 86850 RBC ANTIBODY SCREEN: CPT

## 2017-06-23 PROCEDURE — 36415 COLL VENOUS BLD VENIPUNCTURE: CPT

## 2017-06-23 PROCEDURE — 85025 COMPLETE CBC W/AUTO DIFF WBC: CPT

## 2017-06-23 PROCEDURE — C1889: CPT

## 2017-06-23 PROCEDURE — 86900 BLOOD TYPING SEROLOGIC ABO: CPT

## 2017-06-23 PROCEDURE — 97161 PT EVAL LOW COMPLEX 20 MIN: CPT

## 2017-06-23 PROCEDURE — 76000 FLUOROSCOPY <1 HR PHYS/QHP: CPT

## 2017-06-26 DIAGNOSIS — Z85.46 PERSONAL HISTORY OF MALIGNANT NEOPLASM OF PROSTATE: ICD-10-CM

## 2017-06-26 DIAGNOSIS — Z95.1 PRESENCE OF AORTOCORONARY BYPASS GRAFT: ICD-10-CM

## 2017-06-26 DIAGNOSIS — Z95.820 PERIPHERAL VASCULAR ANGIOPLASTY STATUS WITH IMPLANTS AND GRAFTS: ICD-10-CM

## 2017-06-26 DIAGNOSIS — M48.06 SPINAL STENOSIS, LUMBAR REGION: ICD-10-CM

## 2017-06-26 DIAGNOSIS — Z79.82 LONG TERM (CURRENT) USE OF ASPIRIN: ICD-10-CM

## 2017-06-26 DIAGNOSIS — K59.00 CONSTIPATION, UNSPECIFIED: ICD-10-CM

## 2017-06-26 DIAGNOSIS — F41.9 ANXIETY DISORDER, UNSPECIFIED: ICD-10-CM

## 2017-06-26 DIAGNOSIS — Z85.51 PERSONAL HISTORY OF MALIGNANT NEOPLASM OF BLADDER: ICD-10-CM

## 2017-06-26 DIAGNOSIS — E78.5 HYPERLIPIDEMIA, UNSPECIFIED: ICD-10-CM

## 2017-06-26 DIAGNOSIS — Z92.3 PERSONAL HISTORY OF IRRADIATION: ICD-10-CM

## 2017-06-26 DIAGNOSIS — I10 ESSENTIAL (PRIMARY) HYPERTENSION: ICD-10-CM

## 2017-06-26 DIAGNOSIS — F17.210 NICOTINE DEPENDENCE, CIGARETTES, UNCOMPLICATED: ICD-10-CM

## 2017-06-26 DIAGNOSIS — Z88.0 ALLERGY STATUS TO PENICILLIN: ICD-10-CM

## 2017-06-26 DIAGNOSIS — I73.9 PERIPHERAL VASCULAR DISEASE, UNSPECIFIED: ICD-10-CM

## 2017-06-26 DIAGNOSIS — Z90.6 ACQUIRED ABSENCE OF OTHER PARTS OF URINARY TRACT: ICD-10-CM

## 2017-06-26 DIAGNOSIS — K21.9 GASTRO-ESOPHAGEAL REFLUX DISEASE WITHOUT ESOPHAGITIS: ICD-10-CM

## 2017-06-26 DIAGNOSIS — M54.16 RADICULOPATHY, LUMBAR REGION: ICD-10-CM

## 2017-06-26 DIAGNOSIS — I25.10 ATHEROSCLEROTIC HEART DISEASE OF NATIVE CORONARY ARTERY WITHOUT ANGINA PECTORIS: ICD-10-CM

## 2017-07-06 ENCOUNTER — APPOINTMENT (OUTPATIENT)
Dept: NEUROSURGERY | Facility: CLINIC | Age: 67
End: 2017-07-06

## 2017-07-06 VITALS
DIASTOLIC BLOOD PRESSURE: 78 MMHG | OXYGEN SATURATION: 97 % | WEIGHT: 201 LBS | HEIGHT: 68 IN | BODY MASS INDEX: 30.46 KG/M2 | TEMPERATURE: 97.7 F | SYSTOLIC BLOOD PRESSURE: 144 MMHG | HEART RATE: 87 BPM

## 2017-07-06 DIAGNOSIS — Z82.49 FAMILY HISTORY OF ISCHEMIC HEART DISEASE AND OTHER DISEASES OF THE CIRCULATORY SYSTEM: ICD-10-CM

## 2017-07-06 DIAGNOSIS — R42 DIZZINESS AND GIDDINESS: ICD-10-CM

## 2017-07-06 DIAGNOSIS — Z83.3 FAMILY HISTORY OF DIABETES MELLITUS: ICD-10-CM

## 2017-07-07 PROBLEM — R42 LIGHTHEADEDNESS: Status: RESOLVED | Noted: 2017-06-06 | Resolved: 2017-07-07

## 2017-07-11 PROBLEM — Z83.3 FAMILY HISTORY OF DIABETES MELLITUS: Status: ACTIVE | Noted: 2017-06-06

## 2017-07-11 PROBLEM — Z82.49 FAMILY HISTORY OF HYPERTENSION: Status: ACTIVE | Noted: 2017-06-06

## 2017-07-11 PROBLEM — Z82.49 FAMILY HISTORY OF MYOCARDIAL INFARCTION: Status: ACTIVE | Noted: 2017-06-06

## 2017-07-12 ENCOUNTER — APPOINTMENT (OUTPATIENT)
Dept: NEUROSURGERY | Facility: CLINIC | Age: 67
End: 2017-07-12

## 2017-07-12 VITALS
HEIGHT: 68 IN | TEMPERATURE: 97.9 F | DIASTOLIC BLOOD PRESSURE: 85 MMHG | HEART RATE: 86 BPM | OXYGEN SATURATION: 96 % | WEIGHT: 201 LBS | SYSTOLIC BLOOD PRESSURE: 135 MMHG | BODY MASS INDEX: 30.46 KG/M2

## 2017-07-27 ENCOUNTER — APPOINTMENT (OUTPATIENT)
Dept: NEUROSURGERY | Facility: CLINIC | Age: 67
End: 2017-07-27
Payer: MEDICARE

## 2017-07-27 VITALS
HEIGHT: 68 IN | HEART RATE: 90 BPM | WEIGHT: 201 LBS | DIASTOLIC BLOOD PRESSURE: 74 MMHG | BODY MASS INDEX: 30.46 KG/M2 | SYSTOLIC BLOOD PRESSURE: 121 MMHG | TEMPERATURE: 97.1 F | OXYGEN SATURATION: 97 %

## 2017-07-27 PROCEDURE — 99024 POSTOP FOLLOW-UP VISIT: CPT

## 2017-10-17 ENCOUNTER — APPOINTMENT (OUTPATIENT)
Dept: VASCULAR SURGERY | Facility: CLINIC | Age: 67
End: 2017-10-17
Payer: MEDICARE

## 2017-10-17 VITALS — SYSTOLIC BLOOD PRESSURE: 151 MMHG | OXYGEN SATURATION: 98 % | DIASTOLIC BLOOD PRESSURE: 81 MMHG | HEART RATE: 92 BPM

## 2017-10-17 PROCEDURE — 93978 VASCULAR STUDY: CPT

## 2017-10-17 PROCEDURE — 99213 OFFICE O/P EST LOW 20 MIN: CPT | Mod: 25

## 2018-04-02 ENCOUNTER — FORM ENCOUNTER (OUTPATIENT)
Age: 68
End: 2018-04-02

## 2018-04-03 ENCOUNTER — OUTPATIENT (OUTPATIENT)
Dept: OUTPATIENT SERVICES | Facility: HOSPITAL | Age: 68
LOS: 1 days | End: 2018-04-03
Payer: MEDICARE

## 2018-04-03 ENCOUNTER — APPOINTMENT (OUTPATIENT)
Dept: MRI IMAGING | Facility: HOSPITAL | Age: 68
End: 2018-04-03
Payer: MEDICARE

## 2018-04-03 DIAGNOSIS — Z41.9 ENCOUNTER FOR PROCEDURE FOR PURPOSES OTHER THAN REMEDYING HEALTH STATE, UNSPECIFIED: Chronic | ICD-10-CM

## 2018-04-03 DIAGNOSIS — Z98.890 OTHER SPECIFIED POSTPROCEDURAL STATES: Chronic | ICD-10-CM

## 2018-04-03 DIAGNOSIS — Z95.1 PRESENCE OF AORTOCORONARY BYPASS GRAFT: Chronic | ICD-10-CM

## 2018-04-03 PROCEDURE — 72148 MRI LUMBAR SPINE W/O DYE: CPT | Mod: 26

## 2018-04-03 PROCEDURE — 72148 MRI LUMBAR SPINE W/O DYE: CPT

## 2018-04-05 ENCOUNTER — APPOINTMENT (OUTPATIENT)
Dept: NEUROSURGERY | Facility: CLINIC | Age: 68
End: 2018-04-05
Payer: MEDICARE

## 2018-04-05 VITALS
WEIGHT: 194 LBS | RESPIRATION RATE: 14 BRPM | BODY MASS INDEX: 29.4 KG/M2 | DIASTOLIC BLOOD PRESSURE: 66 MMHG | HEART RATE: 83 BPM | SYSTOLIC BLOOD PRESSURE: 108 MMHG | OXYGEN SATURATION: 95 % | TEMPERATURE: 97.8 F | HEIGHT: 68 IN

## 2018-04-05 PROCEDURE — 99215 OFFICE O/P EST HI 40 MIN: CPT

## 2018-04-17 ENCOUNTER — APPOINTMENT (OUTPATIENT)
Dept: VASCULAR SURGERY | Facility: CLINIC | Age: 68
End: 2018-04-17
Payer: MEDICARE

## 2018-04-17 PROCEDURE — 93978 VASCULAR STUDY: CPT

## 2018-04-17 PROCEDURE — 99213 OFFICE O/P EST LOW 20 MIN: CPT | Mod: 25

## 2018-04-24 ENCOUNTER — APPOINTMENT (OUTPATIENT)
Dept: NEUROSURGERY | Facility: CLINIC | Age: 68
End: 2018-04-24
Payer: MEDICARE

## 2018-04-24 VITALS
SYSTOLIC BLOOD PRESSURE: 128 MMHG | BODY MASS INDEX: 30.01 KG/M2 | OXYGEN SATURATION: 97 % | TEMPERATURE: 98.1 F | DIASTOLIC BLOOD PRESSURE: 68 MMHG | RESPIRATION RATE: 16 BRPM | HEART RATE: 81 BPM | WEIGHT: 198 LBS | HEIGHT: 68 IN

## 2018-04-24 PROCEDURE — 99215 OFFICE O/P EST HI 40 MIN: CPT

## 2018-05-15 VITALS
RESPIRATION RATE: 16 BRPM | SYSTOLIC BLOOD PRESSURE: 124 MMHG | HEART RATE: 82 BPM | OXYGEN SATURATION: 96 % | WEIGHT: 198.42 LBS | TEMPERATURE: 98 F | HEIGHT: 68 IN | DIASTOLIC BLOOD PRESSURE: 80 MMHG

## 2018-05-15 RX ORDER — ATORVASTATIN CALCIUM 80 MG/1
1 TABLET, FILM COATED ORAL
Qty: 0 | Refills: 0 | COMMUNITY

## 2018-05-15 RX ORDER — PANTOPRAZOLE SODIUM 20 MG/1
1 TABLET, DELAYED RELEASE ORAL
Qty: 0 | Refills: 0 | COMMUNITY

## 2018-05-15 RX ORDER — ASPIRIN/CALCIUM CARB/MAGNESIUM 324 MG
1 TABLET ORAL
Qty: 0 | Refills: 0 | COMMUNITY

## 2018-05-15 RX ORDER — ACETAMINOPHEN WITH CODEINE 300MG-30MG
1 TABLET ORAL
Qty: 0 | Refills: 0 | COMMUNITY

## 2018-05-15 RX ORDER — MECLIZINE HCL 12.5 MG
1 TABLET ORAL
Qty: 0 | Refills: 0 | COMMUNITY

## 2018-05-15 NOTE — PATIENT PROFILE ADULT. - PMH
AAA (abdominal aortic aneurysm)    Bladder cancer    Coronary artery disease    GERD (gastroesophageal reflux disease)    Hepatitis B    Hyperlipidemia    Hypertension    Lower back pain    PAD (peripheral artery disease)    Prostate cancer  Radiation [place]  PVD (peripheral vascular disease) AAA (abdominal aortic aneurysm)    Aortic thromboembolism    Bladder cancer    Childhood asthma    Coronary artery disease    GERD (gastroesophageal reflux disease)    Hepatitis B    Hyperlipidemia    Hypertension    Lower back pain    PAD (peripheral artery disease)    Prostate cancer  Radiation [place]  PVD (peripheral vascular disease)

## 2018-05-16 ENCOUNTER — INPATIENT (INPATIENT)
Facility: HOSPITAL | Age: 68
LOS: 1 days | Discharge: HOME | DRG: 460 | End: 2018-05-18
Attending: NEUROLOGICAL SURGERY | Admitting: NEUROLOGICAL SURGERY
Payer: MEDICARE

## 2018-05-16 ENCOUNTER — APPOINTMENT (OUTPATIENT)
Dept: NEUROSURGERY | Facility: HOSPITAL | Age: 68
End: 2018-05-16

## 2018-05-16 DIAGNOSIS — Z98.890 OTHER SPECIFIED POSTPROCEDURAL STATES: Chronic | ICD-10-CM

## 2018-05-16 DIAGNOSIS — Z95.1 PRESENCE OF AORTOCORONARY BYPASS GRAFT: Chronic | ICD-10-CM

## 2018-05-16 DIAGNOSIS — Z41.9 ENCOUNTER FOR PROCEDURE FOR PURPOSES OTHER THAN REMEDYING HEALTH STATE, UNSPECIFIED: Chronic | ICD-10-CM

## 2018-05-16 LAB
BASE EXCESS BLDA CALC-SCNC: -2.4 MMOL/L — LOW (ref -2–3)
BASE EXCESS BLDA CALC-SCNC: -2.5 MMOL/L — LOW (ref -2–3)
CA-I BLDA-SCNC: 1.21 MMOL/L — SIGNIFICANT CHANGE UP (ref 1.12–1.3)
CA-I BLDA-SCNC: 1.43 MMOL/L — HIGH (ref 1.12–1.3)
COHGB MFR BLDA: 0.9 % — SIGNIFICANT CHANGE UP
COHGB MFR BLDA: 2 % — HIGH
GAS PNL BLDA: SIGNIFICANT CHANGE UP
GAS PNL BLDA: SIGNIFICANT CHANGE UP
GLUCOSE BLDC GLUCOMTR-MCNC: 143 MG/DL — HIGH (ref 70–99)
GLUCOSE BLDC GLUCOMTR-MCNC: 152 MG/DL — HIGH (ref 70–99)
GLUCOSE BLDC GLUCOMTR-MCNC: 152 MG/DL — HIGH (ref 70–99)
GLUCOSE BLDC GLUCOMTR-MCNC: 167 MG/DL — HIGH (ref 70–99)
HCO3 BLDA-SCNC: 23 MMOL/L — SIGNIFICANT CHANGE UP (ref 21–28)
HCO3 BLDA-SCNC: 23 MMOL/L — SIGNIFICANT CHANGE UP (ref 21–28)
HGB BLDA-MCNC: 12.2 G/DL — LOW (ref 13–17)
HGB BLDA-MCNC: 13.1 G/DL — SIGNIFICANT CHANGE UP (ref 13–17)
METHGB MFR BLDA: 0.2 % — SIGNIFICANT CHANGE UP
METHGB MFR BLDA: 0.7 % — SIGNIFICANT CHANGE UP
O2 CT VFR BLDA CALC: 18.7 ML/DL — SIGNIFICANT CHANGE UP (ref 15–23)
O2 CT VFR BLDA CALC: SIGNIFICANT CHANGE UP (ref 15–23)
OXYHGB MFR BLDA: 97 % — SIGNIFICANT CHANGE UP (ref 94–100)
OXYHGB MFR BLDA: 99 % — SIGNIFICANT CHANGE UP (ref 94–100)
PCO2 BLDA: 41 MMHG — SIGNIFICANT CHANGE UP (ref 35–48)
PCO2 BLDA: 42 MMHG — SIGNIFICANT CHANGE UP (ref 35–48)
PH BLDA: 7.35 — SIGNIFICANT CHANGE UP (ref 7.35–7.45)
PH BLDA: 7.37 — SIGNIFICANT CHANGE UP (ref 7.35–7.45)
PO2 BLDA: 338 MMHG — HIGH (ref 83–108)
PO2 BLDA: 398 MMHG — HIGH (ref 83–108)
POTASSIUM BLDA-SCNC: 4.5 MMOL/L — SIGNIFICANT CHANGE UP (ref 3.5–4.9)
POTASSIUM BLDA-SCNC: 4.6 MMOL/L — SIGNIFICANT CHANGE UP (ref 3.5–4.9)
SAO2 % BLDA: 100 % — SIGNIFICANT CHANGE UP (ref 95–100)
SAO2 % BLDA: 100 % — SIGNIFICANT CHANGE UP (ref 95–100)
SODIUM BLDA-SCNC: 138 MMOL/L — SIGNIFICANT CHANGE UP (ref 138–146)
SODIUM BLDA-SCNC: 140 MMOL/L — SIGNIFICANT CHANGE UP (ref 138–146)

## 2018-05-16 PROCEDURE — 22842 INSERT SPINE FIXATION DEVICE: CPT

## 2018-05-16 PROCEDURE — 63048 LAM FACETEC &FORAMOT EA ADDL: CPT

## 2018-05-16 PROCEDURE — 22612 ARTHRD PST TQ 1NTRSPC LUMBAR: CPT

## 2018-05-16 PROCEDURE — 63047 LAM FACETEC & FORAMOT LUMBAR: CPT

## 2018-05-16 PROCEDURE — 22614 ARTHRD PST TQ 1NTRSPC EA ADD: CPT

## 2018-05-16 PROCEDURE — 61783 SCAN PROC SPINAL: CPT | Mod: 59

## 2018-05-16 PROCEDURE — 20939 BONE MARROW ASPIR BONE GRFG: CPT

## 2018-05-16 RX ORDER — DIAZEPAM 5 MG
5 TABLET ORAL EVERY 8 HOURS
Qty: 0 | Refills: 0 | Status: DISCONTINUED | OUTPATIENT
Start: 2018-05-16 | End: 2018-05-18

## 2018-05-16 RX ORDER — DEXTROSE 50 % IN WATER 50 %
25 SYRINGE (ML) INTRAVENOUS ONCE
Qty: 0 | Refills: 0 | Status: DISCONTINUED | OUTPATIENT
Start: 2018-05-16 | End: 2018-05-18

## 2018-05-16 RX ORDER — ALBUTEROL 90 UG/1
2 AEROSOL, METERED ORAL EVERY 8 HOURS
Qty: 0 | Refills: 0 | Status: DISCONTINUED | OUTPATIENT
Start: 2018-05-16 | End: 2018-05-18

## 2018-05-16 RX ORDER — MORPHINE SULFATE 50 MG/1
30 CAPSULE, EXTENDED RELEASE ORAL
Qty: 0 | Refills: 0 | Status: DISCONTINUED | OUTPATIENT
Start: 2018-05-16 | End: 2018-05-17

## 2018-05-16 RX ORDER — DEXTROSE 50 % IN WATER 50 %
12.5 SYRINGE (ML) INTRAVENOUS ONCE
Qty: 0 | Refills: 0 | Status: DISCONTINUED | OUTPATIENT
Start: 2018-05-16 | End: 2018-05-18

## 2018-05-16 RX ORDER — ATORVASTATIN CALCIUM 80 MG/1
40 TABLET, FILM COATED ORAL AT BEDTIME
Qty: 0 | Refills: 0 | Status: DISCONTINUED | OUTPATIENT
Start: 2018-05-16 | End: 2018-05-18

## 2018-05-16 RX ORDER — METHOCARBAMOL 500 MG/1
750 TABLET, FILM COATED ORAL EVERY 8 HOURS
Qty: 0 | Refills: 0 | Status: DISCONTINUED | OUTPATIENT
Start: 2018-05-16 | End: 2018-05-18

## 2018-05-16 RX ORDER — DOCUSATE SODIUM 100 MG
100 CAPSULE ORAL THREE TIMES A DAY
Qty: 0 | Refills: 0 | Status: DISCONTINUED | OUTPATIENT
Start: 2018-05-16 | End: 2018-05-18

## 2018-05-16 RX ORDER — ACETAMINOPHEN 500 MG
650 TABLET ORAL EVERY 6 HOURS
Qty: 0 | Refills: 0 | Status: DISCONTINUED | OUTPATIENT
Start: 2018-05-16 | End: 2018-05-18

## 2018-05-16 RX ORDER — SODIUM CHLORIDE 9 MG/ML
1000 INJECTION, SOLUTION INTRAVENOUS
Qty: 0 | Refills: 0 | Status: DISCONTINUED | OUTPATIENT
Start: 2018-05-16 | End: 2018-05-18

## 2018-05-16 RX ORDER — MORPHINE SULFATE 50 MG/1
3 CAPSULE, EXTENDED RELEASE ORAL
Qty: 0 | Refills: 0 | Status: DISCONTINUED | OUTPATIENT
Start: 2018-05-16 | End: 2018-05-17

## 2018-05-16 RX ORDER — CEFAZOLIN SODIUM 1 G
2000 VIAL (EA) INJECTION EVERY 8 HOURS
Qty: 0 | Refills: 0 | Status: DISCONTINUED | OUTPATIENT
Start: 2018-05-16 | End: 2018-05-16

## 2018-05-16 RX ORDER — AMLODIPINE BESYLATE 2.5 MG/1
10 TABLET ORAL DAILY
Qty: 0 | Refills: 0 | Status: DISCONTINUED | OUTPATIENT
Start: 2018-05-16 | End: 2018-05-18

## 2018-05-16 RX ORDER — VALSARTAN 80 MG/1
320 TABLET ORAL DAILY
Qty: 0 | Refills: 0 | Status: DISCONTINUED | OUTPATIENT
Start: 2018-05-16 | End: 2018-05-18

## 2018-05-16 RX ORDER — GLUCAGON INJECTION, SOLUTION 0.5 MG/.1ML
1 INJECTION, SOLUTION SUBCUTANEOUS ONCE
Qty: 0 | Refills: 0 | Status: DISCONTINUED | OUTPATIENT
Start: 2018-05-16 | End: 2018-05-18

## 2018-05-16 RX ORDER — PANTOPRAZOLE SODIUM 20 MG/1
40 TABLET, DELAYED RELEASE ORAL
Qty: 0 | Refills: 0 | Status: DISCONTINUED | OUTPATIENT
Start: 2018-05-16 | End: 2018-05-17

## 2018-05-16 RX ORDER — DEXTROSE MONOHYDRATE, SODIUM CHLORIDE, AND POTASSIUM CHLORIDE 50; .745; 4.5 G/1000ML; G/1000ML; G/1000ML
1000 INJECTION, SOLUTION INTRAVENOUS
Qty: 0 | Refills: 0 | Status: DISCONTINUED | OUTPATIENT
Start: 2018-05-16 | End: 2018-05-17

## 2018-05-16 RX ORDER — DEXTROSE 50 % IN WATER 50 %
15 SYRINGE (ML) INTRAVENOUS ONCE
Qty: 0 | Refills: 0 | Status: DISCONTINUED | OUTPATIENT
Start: 2018-05-16 | End: 2018-05-18

## 2018-05-16 RX ORDER — BUPIVACAINE 13.3 MG/ML
20 INJECTION, SUSPENSION, LIPOSOMAL INFILTRATION ONCE
Qty: 0 | Refills: 0 | Status: DISCONTINUED | OUTPATIENT
Start: 2018-05-16 | End: 2018-05-18

## 2018-05-16 RX ORDER — INSULIN LISPRO 100/ML
VIAL (ML) SUBCUTANEOUS
Qty: 0 | Refills: 0 | Status: DISCONTINUED | OUTPATIENT
Start: 2018-05-16 | End: 2018-05-18

## 2018-05-16 RX ORDER — ONDANSETRON 8 MG/1
4 TABLET, FILM COATED ORAL EVERY 6 HOURS
Qty: 0 | Refills: 0 | Status: DISCONTINUED | OUTPATIENT
Start: 2018-05-16 | End: 2018-05-18

## 2018-05-16 RX ORDER — FAMOTIDINE 10 MG/ML
20 INJECTION INTRAVENOUS EVERY 12 HOURS
Qty: 0 | Refills: 0 | Status: DISCONTINUED | OUTPATIENT
Start: 2018-05-16 | End: 2018-05-17

## 2018-05-16 RX ORDER — DEXAMETHASONE 0.5 MG/5ML
4 ELIXIR ORAL EVERY 6 HOURS
Qty: 0 | Refills: 0 | Status: COMPLETED | OUTPATIENT
Start: 2018-05-16 | End: 2018-05-17

## 2018-05-16 RX ORDER — NALOXONE HYDROCHLORIDE 4 MG/.1ML
0.1 SPRAY NASAL
Qty: 0 | Refills: 0 | Status: DISCONTINUED | OUTPATIENT
Start: 2018-05-16 | End: 2018-05-18

## 2018-05-16 RX ORDER — CEFAZOLIN SODIUM 1 G
2000 VIAL (EA) INJECTION EVERY 8 HOURS
Qty: 0 | Refills: 0 | Status: COMPLETED | OUTPATIENT
Start: 2018-05-16 | End: 2018-05-17

## 2018-05-16 RX ORDER — SENNA PLUS 8.6 MG/1
2 TABLET ORAL AT BEDTIME
Qty: 0 | Refills: 0 | Status: DISCONTINUED | OUTPATIENT
Start: 2018-05-16 | End: 2018-05-18

## 2018-05-16 RX ADMIN — Medication 100 MILLIGRAM(S): at 23:02

## 2018-05-16 RX ADMIN — Medication 5 MILLIGRAM(S): at 23:02

## 2018-05-16 RX ADMIN — Medication 4 MILLIGRAM(S): at 19:10

## 2018-05-16 RX ADMIN — ATORVASTATIN CALCIUM 40 MILLIGRAM(S): 80 TABLET, FILM COATED ORAL at 23:02

## 2018-05-16 RX ADMIN — SENNA PLUS 2 TABLET(S): 8.6 TABLET ORAL at 23:02

## 2018-05-16 RX ADMIN — MORPHINE SULFATE 30 MILLILITER(S): 50 CAPSULE, EXTENDED RELEASE ORAL at 19:00

## 2018-05-16 RX ADMIN — Medication 2000 MILLIGRAM(S): at 23:02

## 2018-05-16 NOTE — CONSULT NOTE ADULT - SUBJECTIVE AND OBJECTIVE BOX
Pre-surgical Pain Inventory  HPI:  67M with history of previous L4 laminectomy for stenosis. Now presents with restenosis and back pain. No relief from conservative therapy. presents today for L3-S1 fusion (16 May 2018 08:03)      Surgery:     Surgeon:     PAST MEDICAL & SURGICAL HISTORY:  Aortic thromboembolism  Childhood asthma  Lower back pain  PVD (peripheral vascular disease)  Hepatitis B  AAA (abdominal aortic aneurysm)  GERD (gastroesophageal reflux disease)  Prostate cancer: Radiation [place]  Bladder cancer  Coronary artery disease  PAD (peripheral artery disease)  Hyperlipidemia  Hypertension  H/O laminectomy: x 4 yrs  Surgery, elective: Peripheral stents in both legs 2 -left, 1- right  S/P CABG (coronary artery bypass graft): 2003      Current Pre-op Pain Medications    Prescribed by:    Past Pain Medication:   [] Oxycodone  [] Hydrocodone  [] Methadone  [] Fentanyl  [] Dilaudid  [] Morphine  [] Tramadol  [] NSAIDs/Anti-inflammatories  [] Tylenol   [] Medrol/Decadron  [] Neuropathic Agents  [] Muscle Relaxants  [] Anxiolytics  [] Anti-depressants   [] Sleep aids    Allergies    penicillin (Hives)    Intolerances        Vital Signs:  Vital Signs Last 24 Hrs  T(C): 36.4 (15 May 2018 14:51), Max: 36.4 (15 May 2018 14:51)  T(F): 97.5 (15 May 2018 14:51), Max: 97.5 (15 May 2018 14:51)  HR: 82 (15 May 2018 14:51) (82 - 82)  BP: 124/80 (15 May 2018 14:51) (124/80 - 124/80)  BP(mean): --  RR: 16 (15 May 2018 14:51) (16 - 16)  SpO2: 96% (15 May 2018 14:51) (96% - 96%)    Labs:       FUNCTIONAL ASSESSMENT:  AVERAGE DAILY PAIN SCORE:    PAIN ASSESSMENT:    PHYSICAL EXAM  GENERAL: NAD   HEAD:  Atraumatic, Normocephalic  EYES: EOMI, PERRLA, conjunctiva and sclera clear  NECK: Supple  NERVOUS SYSTEM:    Alert & Oriented X3, Good concentration;   Cranial nerves grossly intact  Motor Strength 5/5 B/L upper and lower extremities;   Sensation intact to LT in UE/LE in 3 dermatomes    Cervical: No facet or midline tenderness. Negative Reynoso's sign.     Lumbar: No lumbar tenderness.     CHEST/LUNG: Unlabored breathing on RA   HEART: Regular rate  ABDOMEN: Soft, Nontender, Nondistended  EXTREMITIES:  2+ Peripheral Pulses, No clubbing, cyanosis, or edema  SKIN: No rashes or lesions    Assessment:   67y Male presents for     Plan:   Immediate post-op plan  -	PCA Settings:  	Opioid:  	Initial Demand:  	Lockout:  	4 hour limit:	    Rescue plan  -    Tentative floor plan  - Consider addition of long acting based on continuity of pain  -     D/w Dr. Almaraz Pre-surgical Pain Inventory  HPI:  67M with history of previous L4 laminectomy for stenosis. Now presents with restenosis and back pain. No relief from conservative therapy. presents today for L3-S1 fusion (16 May 2018 08:03)    Surgery: L3-S1 fusion    Surgeon: Dr. Alvarez    PAST MEDICAL & SURGICAL HISTORY:  Aortic thromboembolism  Childhood asthma  Lower back pain  PVD (peripheral vascular disease)  Hepatitis B  AAA (abdominal aortic aneurysm)  GERD (gastroesophageal reflux disease)  Prostate cancer: Radiation [place]  Bladder cancer  Coronary artery disease  PAD (peripheral artery disease)  Hyperlipidemia  Hypertension  H/O laminectomy: x 4 yrs  Surgery, elective: Peripheral stents in both legs 2 -left, 1- right  S/P CABG (coronary artery bypass graft): 2003    Current Pre-op Pain Medications  Ibuprofen    Prescribed by:  N/A    Home Medications:  acetaminophen 325 mg oral tablet: 2 tab(s) orally every 6 hours, As needed, For Temp greater than 38 C (100.4 F) (16 May 2018 06:10)  albuterol 90 mcg/inh inhalation aerosol: 2 puff(s) inhaled every 8 hours, As Needed (16 May 2018 06:10)  ALPRAZolam 1 mg oral tablet: 1 tab(s) orally 3 times a day, As Needed (16 May 2018 06:10)  Aspirin Enteric Coated 81 mg oral delayed release tablet: 1 tab(s) orally once a day (16 May 2018 06:10)  atorvastatin 40 mg oral tablet: 1 tab(s) orally once a day (at bedtime) (16 May 2018 06:10)  Exforge 10 mg-320 mg oral tablet: 1 tab(s) orally once a day (16 May 2018 06:10)  Flonase 50 mcg/inh nasal spray: 1 spray(s) nasal once a day (16 May 2018 06:10)  NexIUM 40 mg oral delayed release capsule: 1 cap(s) orally once a day (16 May 2018 06:10)  Viagra 50 mg oral tablet: 1 tab(s) orally once a day, As Needed (16 May 2018 06:10)    Past Pain Medication:   [x Oxycodone  [] Hydrocodone  [] Methadone  [] Fentanyl  [x] Dilaudid  [x] Morphine  [] Tramadol  [x] NSAIDs/Anti-inflammatories  [x] Tylenol   [x] Medrol/Decadron  [] Neuropathic Agents  [x] Muscle Relaxants  [x] Anxiolytics: Xanax  [] Anti-depressants   [] Sleep aids    Allergies  penicillin (Hives)    Intolerances    Vital Signs:  Vital Signs Last 24 Hrs  T(C): 36.4 (15 May 2018 14:51), Max: 36.4 (15 May 2018 14:51)  T(F): 97.5 (15 May 2018 14:51), Max: 97.5 (15 May 2018 14:51)  HR: 82 (15 May 2018 14:51) (82 - 82)  BP: 124/80 (15 May 2018 14:51) (124/80 - 124/80)  BP(mean): --  RR: 16 (15 May 2018 14:51) (16 - 16)  SpO2: 96% (15 May 2018 14:51) (96% - 96%)    Labs:       FUNCTIONAL ASSESSMENT:  AVERAGE DAILY PAIN SCORE: 7-8    PAIN ASSESSMENT:  Pt complains of new onset quality of pain 2 months after prior lami, shooting pain R>LLE, buttock wrapping around to anterior thigh, pt reports 2 falls in past month, unable to walk >1/2 block. Denies BB dysfunction.     PHYSICAL EXAM  GENERAL: NAD   HEAD:  Atraumatic, Normocephalic  EYES: EOMI, PERRLA, conjunctiva and sclera clear  NECK: Supple  NERVOUS SYSTEM:    Alert & Oriented X3, Good concentration;   Cranial nerves grossly intact  Motor Strength 5/5 B/L upper extremities, 4/5 R HF and 5-/5 R EHL, o/w LLE 5/5;   Sensation intact to LT in UE/LE in 3 dermatomes  Lumbar: No lumbar tenderness.   CHEST/LUNG: Unlabored breathing on RA   HEART: Regular rate  ABDOMEN: Soft, Nontender, Nondistended  EXTREMITIES:  2+ Peripheral Pulses, No clubbing, cyanosis, or edema  SKIN: No rashes or lesions    Assessment:   67y Male presents for revision L3-S1 fusion    Plan:   Immediate post-op plan  - PCA Settings:  	Opioid: Morphine  	Initial Demand: 1mg  	Lockout: 6mins  	4 hour limit: 14mg	  - Robaxin 750mg TID PRN Spasms   - Continue Xanax 0.5mg TID PRN Anxiety  Rescue plan  - Morphine 2mg every 1 hours for Severe Breakthrough pain >8/10, max 2/3hours    Tentative floor plan  - Consider addition of long acting based on continuity of pain  - Dilaudid PO vs Percocet PO  - Robaxin vs. switch of Xanax -> Valium    D/w Dr. Almaraz

## 2018-05-16 NOTE — H&P PST ADULT - PMH
AAA (abdominal aortic aneurysm)    Aortic thromboembolism    Bladder cancer    Childhood asthma    Coronary artery disease    GERD (gastroesophageal reflux disease)    Hepatitis B    Hyperlipidemia    Hypertension    Lower back pain    PAD (peripheral artery disease)    Prostate cancer  Radiation [place]  PVD (peripheral vascular disease)

## 2018-05-16 NOTE — H&P PST ADULT - HISTORY OF PRESENT ILLNESS
67M with history of previous L4 laminectomy for stenosis. Now presents with restenosis and back pain. No relief from conservative therapy. presents today for L3-S1 fusion

## 2018-05-17 LAB
ANION GAP SERPL CALC-SCNC: 10 MMOL/L — SIGNIFICANT CHANGE UP (ref 5–17)
BUN SERPL-MCNC: 12 MG/DL — SIGNIFICANT CHANGE UP (ref 7–23)
CALCIUM SERPL-MCNC: 8.7 MG/DL — SIGNIFICANT CHANGE UP (ref 8.4–10.5)
CHLORIDE SERPL-SCNC: 100 MMOL/L — SIGNIFICANT CHANGE UP (ref 96–108)
CO2 SERPL-SCNC: 27 MMOL/L — SIGNIFICANT CHANGE UP (ref 22–31)
CREAT SERPL-MCNC: 0.84 MG/DL — SIGNIFICANT CHANGE UP (ref 0.5–1.3)
GLUCOSE BLDC GLUCOMTR-MCNC: 103 MG/DL — HIGH (ref 70–99)
GLUCOSE BLDC GLUCOMTR-MCNC: 115 MG/DL — HIGH (ref 70–99)
GLUCOSE BLDC GLUCOMTR-MCNC: 134 MG/DL — HIGH (ref 70–99)
GLUCOSE BLDC GLUCOMTR-MCNC: 138 MG/DL — HIGH (ref 70–99)
GLUCOSE SERPL-MCNC: 124 MG/DL — HIGH (ref 70–99)
HCT VFR BLD CALC: 29 % — LOW (ref 39–50)
HGB BLD-MCNC: 9.5 G/DL — LOW (ref 13–17)
MAGNESIUM SERPL-MCNC: 1.9 MG/DL — SIGNIFICANT CHANGE UP (ref 1.6–2.6)
MCHC RBC-ENTMCNC: 30 PG — SIGNIFICANT CHANGE UP (ref 27–34)
MCHC RBC-ENTMCNC: 32.8 G/DL — SIGNIFICANT CHANGE UP (ref 32–36)
MCV RBC AUTO: 91.5 FL — SIGNIFICANT CHANGE UP (ref 80–100)
PHOSPHATE SERPL-MCNC: 3.5 MG/DL — SIGNIFICANT CHANGE UP (ref 2.5–4.5)
PLATELET # BLD AUTO: 145 K/UL — LOW (ref 150–400)
POTASSIUM SERPL-MCNC: 4.6 MMOL/L — SIGNIFICANT CHANGE UP (ref 3.5–5.3)
POTASSIUM SERPL-SCNC: 4.6 MMOL/L — SIGNIFICANT CHANGE UP (ref 3.5–5.3)
RBC # BLD: 3.17 M/UL — LOW (ref 4.2–5.8)
RBC # FLD: 13.5 % — SIGNIFICANT CHANGE UP (ref 10.3–16.9)
SODIUM SERPL-SCNC: 137 MMOL/L — SIGNIFICANT CHANGE UP (ref 135–145)
WBC # BLD: 11.8 K/UL — HIGH (ref 3.8–10.5)
WBC # FLD AUTO: 11.8 K/UL — HIGH (ref 3.8–10.5)

## 2018-05-17 PROCEDURE — 72132 CT LUMBAR SPINE W/DYE: CPT | Mod: 26

## 2018-05-17 RX ORDER — ENOXAPARIN SODIUM 100 MG/ML
40 INJECTION SUBCUTANEOUS AT BEDTIME
Qty: 0 | Refills: 0 | Status: DISCONTINUED | OUTPATIENT
Start: 2018-05-17 | End: 2018-05-18

## 2018-05-17 RX ORDER — PANTOPRAZOLE SODIUM 20 MG/1
40 TABLET, DELAYED RELEASE ORAL
Qty: 0 | Refills: 0 | Status: DISCONTINUED | OUTPATIENT
Start: 2018-05-17 | End: 2018-05-18

## 2018-05-17 RX ORDER — OXYCODONE AND ACETAMINOPHEN 5; 325 MG/1; MG/1
1 TABLET ORAL EVERY 4 HOURS
Qty: 0 | Refills: 0 | Status: DISCONTINUED | OUTPATIENT
Start: 2018-05-17 | End: 2018-05-18

## 2018-05-17 RX ORDER — OXYCODONE AND ACETAMINOPHEN 5; 325 MG/1; MG/1
2 TABLET ORAL EVERY 4 HOURS
Qty: 0 | Refills: 0 | Status: DISCONTINUED | OUTPATIENT
Start: 2018-05-17 | End: 2018-05-18

## 2018-05-17 RX ADMIN — Medication 2000 MILLIGRAM(S): at 06:16

## 2018-05-17 RX ADMIN — Medication 100 MILLIGRAM(S): at 14:20

## 2018-05-17 RX ADMIN — VALSARTAN 320 MILLIGRAM(S): 80 TABLET ORAL at 06:17

## 2018-05-17 RX ADMIN — OXYCODONE AND ACETAMINOPHEN 2 TABLET(S): 5; 325 TABLET ORAL at 16:05

## 2018-05-17 RX ADMIN — Medication 100 MILLIGRAM(S): at 22:15

## 2018-05-17 RX ADMIN — ENOXAPARIN SODIUM 40 MILLIGRAM(S): 100 INJECTION SUBCUTANEOUS at 22:15

## 2018-05-17 RX ADMIN — AMLODIPINE BESYLATE 10 MILLIGRAM(S): 2.5 TABLET ORAL at 06:17

## 2018-05-17 RX ADMIN — Medication 5 MILLIGRAM(S): at 06:17

## 2018-05-17 RX ADMIN — SENNA PLUS 2 TABLET(S): 8.6 TABLET ORAL at 22:15

## 2018-05-17 RX ADMIN — Medication 2000 MILLIGRAM(S): at 14:25

## 2018-05-17 RX ADMIN — PANTOPRAZOLE SODIUM 40 MILLIGRAM(S): 20 TABLET, DELAYED RELEASE ORAL at 06:16

## 2018-05-17 RX ADMIN — Medication 4 MILLIGRAM(S): at 14:30

## 2018-05-17 RX ADMIN — ATORVASTATIN CALCIUM 40 MILLIGRAM(S): 80 TABLET, FILM COATED ORAL at 22:15

## 2018-05-17 RX ADMIN — Medication 4 MILLIGRAM(S): at 06:16

## 2018-05-17 RX ADMIN — Medication 100 MILLIGRAM(S): at 06:27

## 2018-05-17 RX ADMIN — OXYCODONE AND ACETAMINOPHEN 2 TABLET(S): 5; 325 TABLET ORAL at 16:44

## 2018-05-17 RX ADMIN — Medication 5 MILLIGRAM(S): at 14:20

## 2018-05-17 RX ADMIN — Medication 5 MILLIGRAM(S): at 22:15

## 2018-05-17 RX ADMIN — Medication 4 MILLIGRAM(S): at 00:26

## 2018-05-18 ENCOUNTER — TRANSCRIPTION ENCOUNTER (OUTPATIENT)
Age: 68
End: 2018-05-18

## 2018-05-18 VITALS
DIASTOLIC BLOOD PRESSURE: 50 MMHG | RESPIRATION RATE: 17 BRPM | SYSTOLIC BLOOD PRESSURE: 95 MMHG | HEART RATE: 98 BPM | OXYGEN SATURATION: 96 % | TEMPERATURE: 98 F

## 2018-05-18 LAB
ANION GAP SERPL CALC-SCNC: 12 MMOL/L — SIGNIFICANT CHANGE UP (ref 5–17)
BUN SERPL-MCNC: 22 MG/DL — SIGNIFICANT CHANGE UP (ref 7–23)
CALCIUM SERPL-MCNC: 8.6 MG/DL — SIGNIFICANT CHANGE UP (ref 8.4–10.5)
CHLORIDE SERPL-SCNC: 99 MMOL/L — SIGNIFICANT CHANGE UP (ref 96–108)
CO2 SERPL-SCNC: 26 MMOL/L — SIGNIFICANT CHANGE UP (ref 22–31)
CREAT SERPL-MCNC: 1.1 MG/DL — SIGNIFICANT CHANGE UP (ref 0.5–1.3)
GLUCOSE BLDC GLUCOMTR-MCNC: 101 MG/DL — HIGH (ref 70–99)
GLUCOSE BLDC GLUCOMTR-MCNC: 121 MG/DL — HIGH (ref 70–99)
GLUCOSE SERPL-MCNC: 105 MG/DL — HIGH (ref 70–99)
HCT VFR BLD CALC: 27.7 % — LOW (ref 39–50)
HGB BLD-MCNC: 9.3 G/DL — LOW (ref 13–17)
MAGNESIUM SERPL-MCNC: 2 MG/DL — SIGNIFICANT CHANGE UP (ref 1.6–2.6)
MCHC RBC-ENTMCNC: 31.1 PG — SIGNIFICANT CHANGE UP (ref 27–34)
MCHC RBC-ENTMCNC: 33.6 G/DL — SIGNIFICANT CHANGE UP (ref 32–36)
MCV RBC AUTO: 92.6 FL — SIGNIFICANT CHANGE UP (ref 80–100)
PHOSPHATE SERPL-MCNC: 3.2 MG/DL — SIGNIFICANT CHANGE UP (ref 2.5–4.5)
PLATELET # BLD AUTO: 147 K/UL — LOW (ref 150–400)
POTASSIUM SERPL-MCNC: 3.7 MMOL/L — SIGNIFICANT CHANGE UP (ref 3.5–5.3)
POTASSIUM SERPL-SCNC: 3.7 MMOL/L — SIGNIFICANT CHANGE UP (ref 3.5–5.3)
RBC # BLD: 2.99 M/UL — LOW (ref 4.2–5.8)
RBC # FLD: 14 % — SIGNIFICANT CHANGE UP (ref 10.3–16.9)
SODIUM SERPL-SCNC: 137 MMOL/L — SIGNIFICANT CHANGE UP (ref 135–145)
WBC # BLD: 12.6 K/UL — HIGH (ref 3.8–10.5)
WBC # FLD AUTO: 12.6 K/UL — HIGH (ref 3.8–10.5)

## 2018-05-18 RX ORDER — METHOCARBAMOL 500 MG/1
1 TABLET, FILM COATED ORAL
Qty: 42 | Refills: 0
Start: 2018-05-18 | End: 2018-05-31

## 2018-05-18 RX ORDER — SENNA PLUS 8.6 MG/1
2 TABLET ORAL
Qty: 0 | Refills: 0 | DISCHARGE
Start: 2018-05-18

## 2018-05-18 RX ORDER — AMLODIPINE BESYLATE 2.5 MG/1
1 TABLET ORAL
Qty: 0 | Refills: 0 | DISCHARGE
Start: 2018-05-18

## 2018-05-18 RX ORDER — DOCUSATE SODIUM 100 MG
1 CAPSULE ORAL
Qty: 0 | Refills: 0 | DISCHARGE
Start: 2018-05-18

## 2018-05-18 RX ADMIN — VALSARTAN 320 MILLIGRAM(S): 80 TABLET ORAL at 05:07

## 2018-05-18 RX ADMIN — Medication 100 MILLIGRAM(S): at 05:07

## 2018-05-18 RX ADMIN — PANTOPRAZOLE SODIUM 40 MILLIGRAM(S): 20 TABLET, DELAYED RELEASE ORAL at 06:33

## 2018-05-18 RX ADMIN — Medication 5 MILLIGRAM(S): at 06:33

## 2018-05-18 RX ADMIN — OXYCODONE AND ACETAMINOPHEN 2 TABLET(S): 5; 325 TABLET ORAL at 04:48

## 2018-05-18 RX ADMIN — OXYCODONE AND ACETAMINOPHEN 2 TABLET(S): 5; 325 TABLET ORAL at 05:20

## 2018-05-18 RX ADMIN — AMLODIPINE BESYLATE 10 MILLIGRAM(S): 2.5 TABLET ORAL at 05:07

## 2018-05-18 NOTE — DISCHARGE NOTE ADULT - MEDICATION SUMMARY - MEDICATIONS TO TAKE
I will START or STAY ON the medications listed below when I get home from the hospital:    Viagra 50 mg oral tablet  -- 1 tab(s) by mouth once a day, As Needed  -- Indication: For ED    Aspirin Enteric Coated 81 mg oral delayed release tablet  -- 1 tab(s) by mouth once a day  -- Start this on 6/23  -- Indication: For PVD (peripheral vascular disease)    oxyCODONE-acetaminophen 5 mg-325 mg oral tablet  -- 1-2 tab(s) by mouth every 4 hours, As needed, 1 tab for Mod Pain (4-6), 2 tabs for Severe Pain (7 - 10) MDD:12 tabs  -- Indication: For Pain    atorvastatin 40 mg oral tablet  -- 1 tab(s) by mouth once a day (at bedtime)  -- Indication: For Hyperlipidemia    Exforge 10 mg-320 mg oral tablet  -- 1 tab(s) by mouth once a day  -- Indication: For Hypertension    ALPRAZolam 1 mg oral tablet  -- 1 tab(s) by mouth 3 times a day, As Needed  -- Indication: For Anxiety    albuterol 90 mcg/inh inhalation aerosol  -- 2 puff(s) inhaled every 8 hours, As Needed  -- Indication: For Asthma    amLODIPine 10 mg oral tablet  -- 1 tab(s) by mouth once a day  -- Indication: For Hypertension    docusate sodium 100 mg oral capsule  -- 1 cap(s) by mouth 3 times a day  -- Indication: For Constipation    senna oral tablet  -- 2 tab(s) by mouth once a day (at bedtime)  -- Indication: For Constipation    methocarbamol 750 mg oral tablet  -- 1 tab(s) by mouth every 8 hours, As needed, Spasms/Stiffness  -- Indication: For Muscle Stiffness    Flonase 50 mcg/inh nasal spray  -- 1 spray(s) into nose once a day  -- Indication: For Asthma    NexIUM 40 mg oral delayed release capsule  -- 1 cap(s) by mouth once a day  -- Indication: For GERD (gastroesophageal reflux disease)

## 2018-05-18 NOTE — DISCHARGE NOTE ADULT - CARE PLAN
Principal Discharge DX:	Lower back pain  Goal:	return home and regain your independent activity  Assessment and plan of treatment:	Diet: Regular  Activity as tolerated  No heavy lifting anything greater than 10 pounds no bending or twisting  Able to shower and get the incision wet, pat it dry with a clean towel  Once home call the office to make a follow up appointment

## 2018-05-18 NOTE — DISCHARGE NOTE ADULT - PATIENT PORTAL LINK FT
You can access the [facility], offered by [facility], by registering with the following website: [email or web address]

## 2018-05-18 NOTE — DISCHARGE NOTE ADULT - HOSPITAL COURSE
History of Present Illness:  History of Present Illness		  67M with history of previous L4 laminectomy for stenosis. Now presents with restenosis and back pain. No relief from conservative therapy. presents today for L3-S1 fusion    Allergies/Medications:  PCN    pt underwent5/16/2018Spinal stenosis at L4-L5 level    Hemovac removed on 5/18/2018 without incident   Pt cleared for discharge to home

## 2018-05-18 NOTE — DISCHARGE NOTE ADULT - PLAN OF CARE
return home and regain your independent activity Diet: Regular  Activity as tolerated  No heavy lifting anything greater than 10 pounds no bending or twisting  Able to shower and get the incision wet, pat it dry with a clean towel  Once home call the office to make a follow up appointment

## 2018-05-18 NOTE — DISCHARGE NOTE ADULT - NS AS ACTIVITY OBS
Do not make important decisions/Walking-Outdoors allowed/Walking-Indoors allowed/Bathing allowed/No Heavy lifting/straining

## 2018-05-18 NOTE — DISCHARGE NOTE ADULT - MEDICATION SUMMARY - MEDICATIONS TO STOP TAKING
I will STOP taking the medications listed below when I get home from the hospital:    acetaminophen 325 mg oral tablet  -- 2 tab(s) by mouth every 6 hours, As needed, For Temp greater than 38 C (100.4 F)    ibuprofen 600 mg oral tablet  -- 1 tab(s) by mouth every 6 hours

## 2018-05-18 NOTE — PROGRESS NOTE ADULT - SUBJECTIVE AND OBJECTIVE BOX
NEUROSURGERY PAIN MANAGEMENT PROGRESS NOTE    OVERNIGHT EVENTS:  - No acute overnight events.  - Pt tolerated PT/OT well, cleared for home dc  - Drain with high output, will wait until noon to determine dc  - O/w 2x 2 tabs of Percocet since dc of PCA    PLAN/RECOMMENDATIONS:  - C/w Percocet 1-2 tabs every 4 hours for Mod-Severe Pain  - O/w no recommendations for current regimen changes    CALL NP: [phone] for any acute changes in pain throughout day, significant difficulties with , or side effects/adverse effects with pain medications  CALL Dr Martínez Almaraz for weekend call: [phone]    REVIEW OF SYSTEMS:  CONSTITUTIONAL: Afebrile. No fatigue O/N.   NECK/ENT: No pain or stiffness. No throat pain  RESPIRATORY: No cough, wheezing; No shortness of breath  CARDIOVASCULAR: No chest pain, palpitations.   GASTROINTESTINAL: Pt reports passing gas. No BM since preop. No abdominal or epigastric pain. No nausea, vomiting.   NEUROLOGICAL: No headaches, no loss of strength, no numbness, or tremors. No dizziness or lightheadedness with pain medications.   MUSCULOSKELETAL: Incisional back pain. No radicular pain in chronic pattern. No muscle stiffness/cramping. no joint pain or swelling.    FUNCTIONAL ASSESSMENT:  PAIN SCORE AT REST:   3-4      SCALE USED: (1-10 VNRS)  PAIN SCORE WITH ACTIVITY:    still not oob     SCALE USED: (1-10 VNRS)    PAIN ASSESSMENT:  see o/n events    PHYSICAL EXAM  GENERAL: NAD, pt seated up in bed.  NERVOUS SYSTEM:    Alert & Oriented X3, Good concentration;   Cranial nerves grossly intact  Motor exam:         [x] Upper extremity            Bi(c5)  WE(c6)  EE(c7)   FF(c8)                                                R         5/5        5/5        5/5       5/5                                               L          5/5        5/5        5/5       5/5         [x] Lower extremity          HF(l2)   KE(l3)    TA(l4)   EHL(l5)  GS(s1)                                                 R        5/5        5/5        5/5       5/5         5/5                                               L         5/5        5/5       5/5       5/5          5/5         [x] warm well perfused; capillary refill <3 seconds   Sensation intact to LT in UE/LE in 3 dermatomes  CHEST/LUNG: Clear to auscultation bilaterally; No rales, rhonchi, wheezing, or rubs  HEART: Regular rate and rhythm; No murmurs, rubs, or gallops  ABDOMEN: Soft, Nontender, Nondistended; Bowel sounds present  EXTREMITIES:  2+ Peripheral Pulses, No clubbing, cyanosis, or edema  SKIN: No rashes or lesions. Incision C/D/I. + HMV drain.      ASSESSMENT:   67y Male s/p L4/5 laminectomy and medial facetectomy with L4-S1 fusion, POD#2    PLAN:   1. Opioids  Since yesterday 6am, pt has required: minimal use of PCA. Only 2 x 2 tabs of Percocet since dc yesterday.   PLAN: C/w PRN dosing of Pecocet    2. Neuropathics: Pt currently denies neuropathic pain. No numbness/tingling/electric shock like sensation in LE/UE b.l. PLAN: No indication for neuropathic agents.     3. Adjuvants: Pt is not complaining of muscle spasms/cramping in neck/back. Tylenol 650mg q6h PRN for Mild Pain.  PLAN: C/w home anxiolytics. C/w Robaxin dosing.    4. Prophylactic:   Bowel regimen: Docusate Senna  Nausea PRN: Zofran PRN for Nausea, pt does not c/o current    5. Functional Goals: Pt will get OOB with PT today. Pt will resume previous level of activity without impairment from surgery.     6. Additional Consults: None recommended.     7. Additional Labs/Imaging: None recommended.     8. Follow up, Discharge Planning:   Patient is set for discharge to: Home  Discharge is pending: Dc drain   Pain Management follow up plan: F/u inpatient
HPI:  67M with history of previous L4 laminectomy for stenosis. Now presents with restenosis and back pain. No relief from conservative therapy. presents today for L3-S1 fusion (16 May 2018 08:03)    Hospital Course:  POD#0: s/p L4/5 laminectomy, medial facetectomy, L4-S1 fusion. No intra op complication. Patient woke in PACU agitated and combative. He required sedation and restraint  POD#1: no events overnight. HMV drain still in place, on PCA. Decadron taper. Pending CT L spine today      OVERNIGHT EVENTS:  Vital Signs Last 24 Hrs  T(C): 36.7 (17 May 2018 09:22), Max: 37.2 (16 May 2018 20:30)  T(F): 98.1 (17 May 2018 09:22), Max: 99 (16 May 2018 20:30)  HR: 88 (17 May 2018 09:22) (86 - 126)  BP: 108/64 (17 May 2018 09:22) (98/56 - 144/69)  BP(mean): 89 (16 May 2018 19:21) (68 - 89)  RR: 17 (17 May 2018 09:22) (10 - 32)  SpO2: 98% (17 May 2018 09:22) (80% - 100%)    I&O's Summary    16 May 2018 07:01  -  17 May 2018 07:00  --------------------------------------------------------  IN: 511 mL / OUT: 2335 mL / NET: -1824 mL        PHYSICAL EXAM:  Neurological:  AAOx3, NAD, coherent speech, FC  CNII-XII grossly intact, PERRL, EOMI, face symmetric  MAEx4, strength 5/5 UE and LE b/l, no drift  SILT throughout b/l   Incision/wound: lumbar incision clean, dry and intact; dressing in place  +HMV drain in place     TUBES/LINES:  [x] Griffin  [] Lumbar Drain  [] Wound Drains  [x] Others  -HMV drain      DIET:  [] NPO  [x] Mechanical  [] Tube feeds    LABS:        CAPILLARY BLOOD GLUCOSE      POCT Blood Glucose.: 138 mg/dL (17 May 2018 07:07)  POCT Blood Glucose.: 143 mg/dL (16 May 2018 21:55)  POCT Blood Glucose.: 152 mg/dL (16 May 2018 19:19)  POCT Blood Glucose.: 167 mg/dL (16 May 2018 13:52)  POCT Blood Glucose.: 152 mg/dL (16 May 2018 12:32)      Drug Levels: [] N/A    CSF Analysis: [] N/A      Allergies    penicillin (Hives)    Intolerances      MEDICATIONS:  Antibiotics:  ceFAZolin  Injectable. 2000 milliGRAM(s) IV Push every 8 hours    Neuro:  acetaminophen   Tablet 650 milliGRAM(s) Oral every 6 hours PRN  diazepam    Tablet 5 milliGRAM(s) Oral every 8 hours  methocarbamol 750 milliGRAM(s) Oral every 8 hours PRN  morphine PCA (5 mG/mL) 30 milliLiter(s) PCA Continuous PCA Continuous  morphine PCA (5 mG/mL) Rescue Clinician Bolus 3 milliGRAM(s) IV Push every 1 hour PRN  ondansetron Injectable 4 milliGRAM(s) IV Push every 6 hours PRN  ondansetron Injectable 4 milliGRAM(s) IV Push every 6 hours PRN    Anticoagulation:    OTHER:  ALBUTerol    90 MICROgram(s) HFA Inhaler 2 Puff(s) Inhalation every 8 hours PRN  amLODIPine   Tablet 10 milliGRAM(s) Oral daily  atorvastatin 40 milliGRAM(s) Oral at bedtime  BUpivacaine liposome 1.3% Injectable (no eMAR) 20 milliLiter(s) Local Injection once  dexamethasone  Injectable 4 milliGRAM(s) IV Push every 6 hours  dextrose 40% Gel 15 Gram(s) Oral once PRN  dextrose 50% Injectable 12.5 Gram(s) IV Push once  dextrose 50% Injectable 25 Gram(s) IV Push once  dextrose 50% Injectable 25 Gram(s) IV Push once  docusate sodium 100 milliGRAM(s) Oral three times a day  glucagon  Injectable 1 milliGRAM(s) IntraMuscular once PRN  insulin lispro (HumaLOG) corrective regimen sliding scale   SubCutaneous Before meals and at bedtime  naloxone Injectable 0.1 milliGRAM(s) IV Push every 3 minutes PRN  pantoprazole    Tablet 40 milliGRAM(s) Oral before breakfast  senna 2 Tablet(s) Oral at bedtime  valsartan 320 milliGRAM(s) Oral daily    IVF:  dextrose 5%. 1000 milliLiter(s) IV Continuous <Continuous>    CULTURES:    RADIOLOGY & ADDITIONAL TESTS:      ASSESSMENT:  67y Male s/p L4/5 laminectomy and medial facetectomy with L4-S1 fusion, POD#1    LUMBAR STENOSIS/ LUMBARHERNIATION  LUMBAR STENOSIS/ LUMBAR DHERNIATION  LUMBAR STENOSIS/ LUMBAR DISCHERNIATION  No h/o HF  Handoff  MEWS Score  Aortic thromboembolism  Childhood asthma  Lower back pain  PVD (peripheral vascular disease)  Hepatitis B  AAA (abdominal aortic aneurysm)  GERD (gastroesophageal reflux disease)  Prostate cancer  Bladder cancer  Coronary artery disease  PAD (peripheral artery disease)  Hyperlipidemia  Hypertension  Spinal stenosis at L4-L5 level  Spinal stenosis at L4-L5 level  Lumbar spine surgery  H/O laminectomy  Surgery, elective  S/P CABG (coronary artery bypass graft)  H/O total cystectomy      PLAN:  -neuro/spine checks  -pain per pain management  -decadron taper x 4 doses to off  -monitor HMV drain  -f/u CT L spine read  -regular diet  -bowel regimen  -GI/DVT ppx  -OOB/PT/OT  -d/w Dr. Alvarez
HPI:  67M with history of previous L4 laminectomy for stenosis. Now presents with restenosis and back pain. No relief from conservative therapy. presents today for L3-S1 fusion (16 May 2018 08:03)    OVERNIGHT EVENTS:  Vital Signs Last 24 Hrs  T(C): 37.4 (18 May 2018 04:16), Max: 37.4 (18 May 2018 04:16)  T(F): 99.3 (18 May 2018 04:16), Max: 99.3 (18 May 2018 04:16)  HR: 83 (18 May 2018 04:16) (76 - 93)  BP: 132/60 (18 May 2018 04:16) (108/64 - 134/65)  BP(mean): --  RR: 16 (18 May 2018 04:16) (15 - 17)  SpO2: 97% (18 May 2018 04:16) (94% - 98%)    I&O's Summary    17 May 2018 07:01  -  18 May 2018 07:00  --------------------------------------------------------  IN: 0 mL / OUT: 1045 mL / NET: -1045 mL    PHYSICAL EXAM:  Neurological:  AAOx3, NAD, coherent speech, FC  CNII-XII grossly intact, PERRL, EOMI, face symmetric  MAEx4, strength 5/5 UE and LE b/l, no drift  SILT throughout b/l   Incision/wound: lumbar incision clean, dry and intact; dressing in place  +HMV drain in place     Cardiovascular: RRR  Respiratory: Lungs CTAB  Gastrointestinal: +BS  Genitourinary: Voiding without difficulty  Extremities: warm and dry      DIET: Regular    LABS:                        9.3    12.6  )-----------( 147      ( 18 May 2018 06:53 )             27.7     05-18    137  |  99  |  22  ----------------------------<  105<H>  3.7   |  26  |  1.10    Ca    8.6      18 May 2018 06:54  Phos  3.2     05-18  Mg     2.0     05-18      CAPILLARY BLOOD GLUCOSE      POCT Blood Glucose.: 121 mg/dL (18 May 2018 06:53)  POCT Blood Glucose.: 103 mg/dL (17 May 2018 22:11)  POCT Blood Glucose.: 115 mg/dL (17 May 2018 17:15)  POCT Blood Glucose.: 134 mg/dL (17 May 2018 11:34)      Drug Levels: [] N/A    CSF Analysis: [] N/A      Allergies    penicillin (Hives)    Intolerances      MEDICATIONS:  Antibiotics:    Neuro:  acetaminophen   Tablet 650 milliGRAM(s) Oral every 6 hours PRN  diazepam    Tablet 5 milliGRAM(s) Oral every 8 hours  methocarbamol 750 milliGRAM(s) Oral every 8 hours PRN  ondansetron Injectable 4 milliGRAM(s) IV Push every 6 hours PRN  ondansetron Injectable 4 milliGRAM(s) IV Push every 6 hours PRN  oxyCODONE    5 mG/acetaminophen 325 mG 1 Tablet(s) Oral every 4 hours PRN  oxyCODONE    5 mG/acetaminophen 325 mG 2 Tablet(s) Oral every 4 hours PRN    Anticoagulation:  enoxaparin Injectable 40 milliGRAM(s) SubCutaneous at bedtime    OTHER:  ALBUTerol    90 MICROgram(s) HFA Inhaler 2 Puff(s) Inhalation every 8 hours PRN  amLODIPine   Tablet 10 milliGRAM(s) Oral daily  atorvastatin 40 milliGRAM(s) Oral at bedtime  BUpivacaine liposome 1.3% Injectable (no eMAR) 20 milliLiter(s) Local Injection once  dextrose 40% Gel 15 Gram(s) Oral once PRN  dextrose 50% Injectable 12.5 Gram(s) IV Push once  dextrose 50% Injectable 25 Gram(s) IV Push once  dextrose 50% Injectable 25 Gram(s) IV Push once  docusate sodium 100 milliGRAM(s) Oral three times a day  glucagon  Injectable 1 milliGRAM(s) IntraMuscular once PRN  insulin lispro (HumaLOG) corrective regimen sliding scale   SubCutaneous Before meals and at bedtime  naloxone Injectable 0.1 milliGRAM(s) IV Push every 3 minutes PRN  pantoprazole    Tablet 40 milliGRAM(s) Oral before breakfast  senna 2 Tablet(s) Oral at bedtime  valsartan 320 milliGRAM(s) Oral daily    IVF:  dextrose 5%. 1000 milliLiter(s) IV Continuous <Continuous>    CULTURES:    RADIOLOGY & ADDITIONAL TESTS:      ASSESSMENT:    67y Male s/p L4/5 laminectomy and medial facetectomy with L4-S1 fusion, POD#1      PLAN:    NEURO:    Monitor neuro status  O/PT  F/U Hemovac output  Continue current medical regime    Dispo: Discussed with attending
NEUROSURGERY PAIN MANAGEMENT PROGRESS NOTE    OVERNIGHT EVENTS:  - Pt currently denies radicular pain, reporting moderate intensity incisional pain. No difficulties with bed mobility but minimal mobilization.   - O/N use of PCA: 5mg IV Morphine    PLAN/RECOMMENDATIONS:  - Dc PCA after PTOT   - Start Percocet dosing    CALL NP: [phone] for any acute changes in pain throughout day, significant difficulties with activity, or side effects/adverse effects with pain medications  CALL Dr Martínez Almaraz for weekend call: [phone]    Home Medications:  acetaminophen 325 mg oral tablet: 2 tab(s) orally every 6 hours, As needed, For Temp greater than 38 C (100.4 F) (16 May 2018 06:10)  albuterol 90 mcg/inh inhalation aerosol: 2 puff(s) inhaled every 8 hours, As Needed (16 May 2018 06:10)  ALPRAZolam 1 mg oral tablet: 1 tab(s) orally 3 times a day, As Needed (16 May 2018 06:10)  Aspirin Enteric Coated 81 mg oral delayed release tablet: 1 tab(s) orally once a day (16 May 2018 06:10)  atorvastatin 40 mg oral tablet: 1 tab(s) orally once a day (at bedtime) (16 May 2018 06:10)  Exforge 10 mg-320 mg oral tablet: 1 tab(s) orally once a day (16 May 2018 06:10)  Flonase 50 mcg/inh nasal spray: 1 spray(s) nasal once a day (16 May 2018 06:10)  NexIUM 40 mg oral delayed release capsule: 1 cap(s) orally once a day (16 May 2018 06:10)  Viagra 50 mg oral tablet: 1 tab(s) orally once a day, As Needed (16 May 2018 06:10)      REVIEW OF SYSTEMS:  CONSTITUTIONAL: Afebrile. No fatigue O/N.   NECK/ENT: No pain or stiffness. No throat pain  RESPIRATORY: No cough, wheezing; No shortness of breath  CARDIOVASCULAR: No chest pain, palpitations.   GASTROINTESTINAL: Pt reports passing gas. No BM since preop. No abdominal or epigastric pain. No nausea, vomiting.   NEUROLOGICAL: No headaches, no loss of strength, no numbness, or tremors. No dizziness or lightheadedness with pain medications.   MUSCULOSKELETAL: Incisional back pain. No radicular pain in chronic pattern. No muscle stiffness/cramping. no joint pain or swelling.    FUNCTIONAL ASSESSMENT:  PAIN SCORE AT REST:   3-4      SCALE USED: (1-10 VNRS)  PAIN SCORE WITH ACTIVITY:    still not oob     SCALE USED: (1-10 VNRS)    PAIN ASSESSMENT:  see o/n events    PHYSICAL EXAM  GENERAL: NAD, pt seated up in bed.  NERVOUS SYSTEM:    Alert & Oriented X3, Good concentration;   Cranial nerves grossly intact  Motor exam:         [x] Upper extremity            Bi(c5)  WE(c6)  EE(c7)   FF(c8)                                                R         5/5        5/5        5/5       5/5                                               L          5/5        5/5        5/5       5/5         [x] Lower extremity          HF(l2)   KE(l3)    TA(l4)   EHL(l5)  GS(s1)                                                 R        5/5        5/5        5/5       5/5         5/5                                               L         5/5        5/5       5/5       5/5          5/5         [x] warm well perfused; capillary refill <3 seconds   Sensation intact to LT in UE/LE in 3 dermatomes  CHEST/LUNG: Clear to auscultation bilaterally; No rales, rhonchi, wheezing, or rubs  HEART: Regular rate and rhythm; No murmurs, rubs, or gallops  ABDOMEN: Soft, Nontender, Nondistended; Bowel sounds present  EXTREMITIES:  2+ Peripheral Pulses, No clubbing, cyanosis, or edema  SKIN: No rashes or lesions. Incision C/D/I.      ASSESSMENT:   67y Male s/p L4/5 laminectomy and medial facetectomy with L4-S1 fusion, POD#1    PLAN:   1. Opioids  Since yesterday 6am, pt has required: minimal opiates.    PLAN: Dc PCA after mobilization with PT    2. Neuropathics: Pt currently denies neuropathic pain. No numbness/tingling/electric shock like sensation in LE/UE b.l. PLAN:  No indication for neuropathic agents.     3. Adjuvants: Pt is not complaining of muscle spasms/cramping in neck/back. Tylenol 650mg q6h PRN for Mild Pain. Pt will be placed on Percocet. PLAN: C/w home anxiolytics. C/w Robaxin dosing.    4. Prophylactic:   Bowel regimen: Docusate Senna  Nausea PRN: Zofran PRN for Nausea, pt does not c/o current    5. Functional Goals: Pt will get OOB with PT today. Pt will resume previous level of activity without impairment from surgery.     6. Additional Consults: None recommended.     7. Additional Labs/Imaging: None recommended.     8. Follow up, Discharge Planning:   Patient is set for discharge to: Pending PTOT eval, likely home  Discharge is pending: Dc drain   Pain Management follow up plan: F/u inpatient
Neurosurgery POC  POD zero L4/5 laminectomy, medial facetectomy, L4-S1 fusion. No intra op complication. Patient woke in PACU agitated and combative. He required sedation  and restraint.   GAYE in place producing bloody discharge.  Dressing was reenforced.   PE: Alert, awake, confused, wants to go home.  Obey simple commands  Moves all extremities with good strength. 5/5.      T(C): 36.4 (05-16-18 @ 16:05), Max: 36.4 (05-16-18 @ 16:05)  HR: 106 (05-16-18 @ 17:35) (96 - 126)  BP: 106/64 (05-16-18 @ 17:35) (103/54 - 135/66)  RR: 14 (05-16-18 @ 17:35) (14 - 32)  SpO2: 92% (05-16-18 @ 17:35) (80% - 100%)  Wt(kg): --    Wound: GAYE in place. Dressing dry and clean.    Imaging: will do post op CT    Assessment/Plan: 67 male post lumbar fusion  plan: Send to floor on call  PT/OT OOB ambulate  regular diet  VIPUL Alvarez.

## 2018-05-21 PROBLEM — J45.909 UNSPECIFIED ASTHMA, UNCOMPLICATED: Chronic | Status: ACTIVE | Noted: 2018-05-15

## 2018-05-21 PROBLEM — K21.9 GASTRO-ESOPHAGEAL REFLUX DISEASE WITHOUT ESOPHAGITIS: Chronic | Status: ACTIVE | Noted: 2018-05-15

## 2018-05-21 PROBLEM — I71.4 ABDOMINAL AORTIC ANEURYSM, WITHOUT RUPTURE: Chronic | Status: ACTIVE | Noted: 2018-05-15

## 2018-05-21 PROBLEM — I73.9 PERIPHERAL VASCULAR DISEASE, UNSPECIFIED: Chronic | Status: ACTIVE | Noted: 2018-05-15

## 2018-05-21 PROBLEM — I74.10 EMBOLISM AND THROMBOSIS OF UNSPECIFIED PARTS OF AORTA: Chronic | Status: ACTIVE | Noted: 2018-05-15

## 2018-05-21 PROBLEM — M54.5 LOW BACK PAIN: Chronic | Status: ACTIVE | Noted: 2018-05-15

## 2018-05-22 DIAGNOSIS — Z95.1 PRESENCE OF AORTOCORONARY BYPASS GRAFT: ICD-10-CM

## 2018-05-22 DIAGNOSIS — Z85.46 PERSONAL HISTORY OF MALIGNANT NEOPLASM OF PROSTATE: ICD-10-CM

## 2018-05-22 DIAGNOSIS — R45.1 RESTLESSNESS AND AGITATION: ICD-10-CM

## 2018-05-22 DIAGNOSIS — M48.061 SPINAL STENOSIS, LUMBAR REGION WITHOUT NEUROGENIC CLAUDICATION: ICD-10-CM

## 2018-05-22 DIAGNOSIS — Z95.828 PRESENCE OF OTHER VASCULAR IMPLANTS AND GRAFTS: ICD-10-CM

## 2018-05-22 DIAGNOSIS — I10 ESSENTIAL (PRIMARY) HYPERTENSION: ICD-10-CM

## 2018-05-22 DIAGNOSIS — Z85.51 PERSONAL HISTORY OF MALIGNANT NEOPLASM OF BLADDER: ICD-10-CM

## 2018-05-22 DIAGNOSIS — Z79.82 LONG TERM (CURRENT) USE OF ASPIRIN: ICD-10-CM

## 2018-05-22 DIAGNOSIS — E78.5 HYPERLIPIDEMIA, UNSPECIFIED: ICD-10-CM

## 2018-05-22 DIAGNOSIS — Z79.1 LONG TERM (CURRENT) USE OF NON-STEROIDAL ANTI-INFLAMMATORIES (NSAID): ICD-10-CM

## 2018-05-22 DIAGNOSIS — I71.4 ABDOMINAL AORTIC ANEURYSM, WITHOUT RUPTURE: ICD-10-CM

## 2018-05-22 DIAGNOSIS — K21.9 GASTRO-ESOPHAGEAL REFLUX DISEASE WITHOUT ESOPHAGITIS: ICD-10-CM

## 2018-05-22 DIAGNOSIS — J45.909 UNSPECIFIED ASTHMA, UNCOMPLICATED: ICD-10-CM

## 2018-05-22 DIAGNOSIS — Z92.3 PERSONAL HISTORY OF IRRADIATION: ICD-10-CM

## 2018-05-22 DIAGNOSIS — I25.10 ATHEROSCLEROTIC HEART DISEASE OF NATIVE CORONARY ARTERY WITHOUT ANGINA PECTORIS: ICD-10-CM

## 2018-05-22 DIAGNOSIS — Z79.51 LONG TERM (CURRENT) USE OF INHALED STEROIDS: ICD-10-CM

## 2018-05-22 DIAGNOSIS — Z88.0 ALLERGY STATUS TO PENICILLIN: ICD-10-CM

## 2018-05-23 ENCOUNTER — MEDICATION RENEWAL (OUTPATIENT)
Age: 68
End: 2018-05-23

## 2018-05-28 ENCOUNTER — FORM ENCOUNTER (OUTPATIENT)
Age: 68
End: 2018-05-28

## 2018-05-29 ENCOUNTER — OUTPATIENT (OUTPATIENT)
Dept: OUTPATIENT SERVICES | Facility: HOSPITAL | Age: 68
LOS: 1 days | End: 2018-05-29
Payer: MEDICARE

## 2018-05-29 ENCOUNTER — APPOINTMENT (OUTPATIENT)
Dept: NEUROSURGERY | Facility: CLINIC | Age: 68
End: 2018-05-29
Payer: MEDICARE

## 2018-05-29 VITALS
SYSTOLIC BLOOD PRESSURE: 105 MMHG | TEMPERATURE: 98.1 F | BODY MASS INDEX: 29.4 KG/M2 | OXYGEN SATURATION: 99 % | WEIGHT: 194 LBS | DIASTOLIC BLOOD PRESSURE: 54 MMHG | HEIGHT: 68 IN | HEART RATE: 95 BPM | RESPIRATION RATE: 16 BRPM

## 2018-05-29 DIAGNOSIS — Z98.890 OTHER SPECIFIED POSTPROCEDURAL STATES: Chronic | ICD-10-CM

## 2018-05-29 DIAGNOSIS — Z41.9 ENCOUNTER FOR PROCEDURE FOR PURPOSES OTHER THAN REMEDYING HEALTH STATE, UNSPECIFIED: Chronic | ICD-10-CM

## 2018-05-29 DIAGNOSIS — Z95.1 PRESENCE OF AORTOCORONARY BYPASS GRAFT: Chronic | ICD-10-CM

## 2018-05-29 PROCEDURE — 72100 X-RAY EXAM L-S SPINE 2/3 VWS: CPT | Mod: 26

## 2018-05-29 PROCEDURE — 72100 X-RAY EXAM L-S SPINE 2/3 VWS: CPT

## 2018-05-29 PROCEDURE — 99024 POSTOP FOLLOW-UP VISIT: CPT

## 2018-06-01 ENCOUNTER — INPATIENT (INPATIENT)
Facility: HOSPITAL | Age: 68
LOS: 5 days | Discharge: HOME HEALTH | DRG: 862 | End: 2018-06-07
Attending: NEUROLOGICAL SURGERY | Admitting: NEUROLOGICAL SURGERY
Payer: MEDICARE

## 2018-06-01 VITALS
TEMPERATURE: 99 F | DIASTOLIC BLOOD PRESSURE: 67 MMHG | OXYGEN SATURATION: 97 % | RESPIRATION RATE: 16 BRPM | HEART RATE: 80 BPM | SYSTOLIC BLOOD PRESSURE: 109 MMHG

## 2018-06-01 DIAGNOSIS — T14.8XXA OTHER INJURY OF UNSPECIFIED BODY REGION, INITIAL ENCOUNTER: ICD-10-CM

## 2018-06-01 DIAGNOSIS — Z41.9 ENCOUNTER FOR PROCEDURE FOR PURPOSES OTHER THAN REMEDYING HEALTH STATE, UNSPECIFIED: Chronic | ICD-10-CM

## 2018-06-01 DIAGNOSIS — Z98.890 OTHER SPECIFIED POSTPROCEDURAL STATES: Chronic | ICD-10-CM

## 2018-06-01 DIAGNOSIS — Z95.1 PRESENCE OF AORTOCORONARY BYPASS GRAFT: Chronic | ICD-10-CM

## 2018-06-01 LAB
ALBUMIN SERPL ELPH-MCNC: 3.8 G/DL — SIGNIFICANT CHANGE UP (ref 3.3–5)
ALP SERPL-CCNC: 82 U/L — SIGNIFICANT CHANGE UP (ref 40–120)
ALT FLD-CCNC: 28 U/L — SIGNIFICANT CHANGE UP (ref 10–45)
ANION GAP SERPL CALC-SCNC: 16 MMOL/L — SIGNIFICANT CHANGE UP (ref 5–17)
AST SERPL-CCNC: 21 U/L — SIGNIFICANT CHANGE UP (ref 10–40)
BASOPHILS NFR BLD AUTO: 0.1 % — SIGNIFICANT CHANGE UP (ref 0–2)
BILIRUB SERPL-MCNC: 0.4 MG/DL — SIGNIFICANT CHANGE UP (ref 0.2–1.2)
BUN SERPL-MCNC: 14 MG/DL — SIGNIFICANT CHANGE UP (ref 7–23)
CALCIUM SERPL-MCNC: 9.2 MG/DL — SIGNIFICANT CHANGE UP (ref 8.4–10.5)
CHLORIDE SERPL-SCNC: 98 MMOL/L — SIGNIFICANT CHANGE UP (ref 96–108)
CO2 SERPL-SCNC: 23 MMOL/L — SIGNIFICANT CHANGE UP (ref 22–31)
CREAT SERPL-MCNC: 0.98 MG/DL — SIGNIFICANT CHANGE UP (ref 0.5–1.3)
EOSINOPHIL NFR BLD AUTO: 0.2 % — SIGNIFICANT CHANGE UP (ref 0–6)
GLUCOSE SERPL-MCNC: 101 MG/DL — HIGH (ref 70–99)
HCT VFR BLD CALC: 27.6 % — LOW (ref 39–50)
HGB BLD-MCNC: 9 G/DL — LOW (ref 13–17)
LYMPHOCYTES # BLD AUTO: 12.5 % — LOW (ref 13–44)
MCHC RBC-ENTMCNC: 29.8 PG — SIGNIFICANT CHANGE UP (ref 27–34)
MCHC RBC-ENTMCNC: 32.6 G/DL — SIGNIFICANT CHANGE UP (ref 32–36)
MCV RBC AUTO: 91.4 FL — SIGNIFICANT CHANGE UP (ref 80–100)
MONOCYTES NFR BLD AUTO: 8.2 % — SIGNIFICANT CHANGE UP (ref 2–14)
NEUTROPHILS NFR BLD AUTO: 79 % — HIGH (ref 43–77)
PLATELET # BLD AUTO: 458 K/UL — HIGH (ref 150–400)
POTASSIUM SERPL-MCNC: 4.4 MMOL/L — SIGNIFICANT CHANGE UP (ref 3.5–5.3)
POTASSIUM SERPL-SCNC: 4.4 MMOL/L — SIGNIFICANT CHANGE UP (ref 3.5–5.3)
PROT SERPL-MCNC: 7.2 G/DL — SIGNIFICANT CHANGE UP (ref 6–8.3)
RBC # BLD: 3.02 M/UL — LOW (ref 4.2–5.8)
RBC # FLD: 14.4 % — SIGNIFICANT CHANGE UP (ref 10.3–16.9)
SODIUM SERPL-SCNC: 137 MMOL/L — SIGNIFICANT CHANGE UP (ref 135–145)
WBC # BLD: 13.9 K/UL — HIGH (ref 3.8–10.5)
WBC # FLD AUTO: 13.9 K/UL — HIGH (ref 3.8–10.5)

## 2018-06-01 PROCEDURE — 99285 EMERGENCY DEPT VISIT HI MDM: CPT

## 2018-06-01 PROCEDURE — 99222 1ST HOSP IP/OBS MODERATE 55: CPT

## 2018-06-01 RX ORDER — VANCOMYCIN HCL 1 G
1250 VIAL (EA) INTRAVENOUS ONCE
Qty: 0 | Refills: 0 | Status: COMPLETED | OUTPATIENT
Start: 2018-06-01 | End: 2018-06-02

## 2018-06-01 RX ORDER — CEFEPIME 1 G/1
2000 INJECTION, POWDER, FOR SOLUTION INTRAMUSCULAR; INTRAVENOUS ONCE
Qty: 0 | Refills: 0 | Status: COMPLETED | OUTPATIENT
Start: 2018-06-01 | End: 2018-06-01

## 2018-06-01 RX ORDER — ONDANSETRON 8 MG/1
4 TABLET, FILM COATED ORAL EVERY 6 HOURS
Qty: 0 | Refills: 0 | Status: DISCONTINUED | OUTPATIENT
Start: 2018-06-01 | End: 2018-06-07

## 2018-06-01 RX ORDER — FLUTICASONE PROPIONATE 50 MCG
1 SPRAY, SUSPENSION NASAL DAILY
Qty: 0 | Refills: 0 | Status: DISCONTINUED | OUTPATIENT
Start: 2018-06-01 | End: 2018-06-07

## 2018-06-01 RX ORDER — DOCUSATE SODIUM 100 MG
100 CAPSULE ORAL THREE TIMES A DAY
Qty: 0 | Refills: 0 | Status: DISCONTINUED | OUTPATIENT
Start: 2018-06-01 | End: 2018-06-07

## 2018-06-01 RX ORDER — PANTOPRAZOLE SODIUM 20 MG/1
40 TABLET, DELAYED RELEASE ORAL
Qty: 0 | Refills: 0 | Status: DISCONTINUED | OUTPATIENT
Start: 2018-06-01 | End: 2018-06-07

## 2018-06-01 RX ORDER — OXYCODONE AND ACETAMINOPHEN 5; 325 MG/1; MG/1
2 TABLET ORAL EVERY 4 HOURS
Qty: 0 | Refills: 0 | Status: DISCONTINUED | OUTPATIENT
Start: 2018-06-01 | End: 2018-06-07

## 2018-06-01 RX ORDER — ALBUTEROL 90 UG/1
2 AEROSOL, METERED ORAL EVERY 6 HOURS
Qty: 0 | Refills: 0 | Status: DISCONTINUED | OUTPATIENT
Start: 2018-06-01 | End: 2018-06-07

## 2018-06-01 RX ORDER — AMLODIPINE BESYLATE 2.5 MG/1
10 TABLET ORAL DAILY
Qty: 0 | Refills: 0 | Status: DISCONTINUED | OUTPATIENT
Start: 2018-06-01 | End: 2018-06-07

## 2018-06-01 RX ORDER — ALPRAZOLAM 0.25 MG
1 TABLET ORAL THREE TIMES A DAY
Qty: 0 | Refills: 0 | Status: DISCONTINUED | OUTPATIENT
Start: 2018-06-01 | End: 2018-06-07

## 2018-06-01 RX ORDER — CEFEPIME 1 G/1
2000 INJECTION, POWDER, FOR SOLUTION INTRAMUSCULAR; INTRAVENOUS EVERY 8 HOURS
Qty: 0 | Refills: 0 | Status: DISCONTINUED | OUTPATIENT
Start: 2018-06-02 | End: 2018-06-02

## 2018-06-01 RX ORDER — OXYCODONE AND ACETAMINOPHEN 5; 325 MG/1; MG/1
2 TABLET ORAL ONCE
Qty: 0 | Refills: 0 | Status: DISCONTINUED | OUTPATIENT
Start: 2018-06-01 | End: 2018-06-01

## 2018-06-01 RX ORDER — ATORVASTATIN CALCIUM 80 MG/1
40 TABLET, FILM COATED ORAL AT BEDTIME
Qty: 0 | Refills: 0 | Status: DISCONTINUED | OUTPATIENT
Start: 2018-06-01 | End: 2018-06-05

## 2018-06-01 RX ORDER — VANCOMYCIN HCL 1 G
1250 VIAL (EA) INTRAVENOUS EVERY 12 HOURS
Qty: 0 | Refills: 0 | Status: DISCONTINUED | OUTPATIENT
Start: 2018-06-02 | End: 2018-06-03

## 2018-06-01 RX ORDER — CEFEPIME 1 G/1
INJECTION, POWDER, FOR SOLUTION INTRAMUSCULAR; INTRAVENOUS
Qty: 0 | Refills: 0 | Status: DISCONTINUED | OUTPATIENT
Start: 2018-06-01 | End: 2018-06-02

## 2018-06-01 RX ORDER — VANCOMYCIN HCL 1 G
VIAL (EA) INTRAVENOUS
Qty: 0 | Refills: 0 | Status: DISCONTINUED | OUTPATIENT
Start: 2018-06-02 | End: 2018-06-03

## 2018-06-01 RX ORDER — ACETAMINOPHEN 500 MG
650 TABLET ORAL EVERY 6 HOURS
Qty: 0 | Refills: 0 | Status: DISCONTINUED | OUTPATIENT
Start: 2018-06-01 | End: 2018-06-07

## 2018-06-01 RX ORDER — ENOXAPARIN SODIUM 100 MG/ML
40 INJECTION SUBCUTANEOUS AT BEDTIME
Qty: 0 | Refills: 0 | Status: DISCONTINUED | OUTPATIENT
Start: 2018-06-01 | End: 2018-06-03

## 2018-06-01 RX ORDER — VANCOMYCIN HCL 1 G
VIAL (EA) INTRAVENOUS
Qty: 0 | Refills: 0 | Status: DISCONTINUED | OUTPATIENT
Start: 2018-06-01 | End: 2018-06-01

## 2018-06-01 RX ORDER — METHOCARBAMOL 500 MG/1
750 TABLET, FILM COATED ORAL EVERY 8 HOURS
Qty: 0 | Refills: 0 | Status: DISCONTINUED | OUTPATIENT
Start: 2018-06-01 | End: 2018-06-07

## 2018-06-01 RX ORDER — SENNA PLUS 8.6 MG/1
2 TABLET ORAL AT BEDTIME
Qty: 0 | Refills: 0 | Status: DISCONTINUED | OUTPATIENT
Start: 2018-06-01 | End: 2018-06-07

## 2018-06-01 RX ORDER — VANCOMYCIN HCL 1 G
109400 VIAL (EA) INTRAVENOUS ONCE
Qty: 0 | Refills: 0 | Status: DISCONTINUED | OUTPATIENT
Start: 2018-06-01 | End: 2018-06-01

## 2018-06-01 RX ADMIN — CEFEPIME 100 MILLIGRAM(S): 1 INJECTION, POWDER, FOR SOLUTION INTRAMUSCULAR; INTRAVENOUS at 22:09

## 2018-06-01 RX ADMIN — Medication 100 MILLIGRAM(S): at 22:09

## 2018-06-01 RX ADMIN — OXYCODONE AND ACETAMINOPHEN 2 TABLET(S): 5; 325 TABLET ORAL at 15:19

## 2018-06-01 RX ADMIN — ATORVASTATIN CALCIUM 40 MILLIGRAM(S): 80 TABLET, FILM COATED ORAL at 22:09

## 2018-06-01 RX ADMIN — ENOXAPARIN SODIUM 40 MILLIGRAM(S): 100 INJECTION SUBCUTANEOUS at 22:09

## 2018-06-01 NOTE — CONSULT NOTE ADULT - SUBJECTIVE AND OBJECTIVE BOX
HPI:      PAST MEDICAL & SURGICAL HISTORY:  Aortic thromboembolism  Childhood asthma  Lower back pain  PVD (peripheral vascular disease)  Hepatitis B  AAA (abdominal aortic aneurysm)  GERD (gastroesophageal reflux disease)  Prostate cancer: Radiation [place]  Bladder cancer  Coronary artery disease  PAD (peripheral artery disease)  Hyperlipidemia  Hypertension  H/O laminectomy: x 4 yrs  Surgery, elective: Peripheral stents in both legs 2 -left, 1- right  S/P CABG (coronary artery bypass graft): 2003        REVIEW OF SYSTEMS:    General:	 no weakness; no fevers, no chills  Skin/Breast: no rash  Respiratory and Thorax: no SOB, no cough  Cardiovascular:	No chest pain  Gastrointestinal:	 no nausea, vomiting , diarrhea  Genitourinary:	no dysuria, no difficulty urinating, no hematuria  Musculoskeletal:	no weakness, no joint swelling/pain  Neurological:	no focal weakness/numbness  Endocrine:	no polyuria, no polydipsia      ANTIBIOTICS:  MEDICATIONS  (STANDING):    MEDICATIONS  (PRN):      Allergies    penicillin (Hives)    Intolerances        SOCIAL HISTORY:    FAMILY HISTORY:      Vital Signs Last 24 Hrs  T(C): 37.2 (01 Jun 2018 13:54), Max: 37.2 (01 Jun 2018 13:54)  T(F): 98.9 (01 Jun 2018 13:54), Max: 98.9 (01 Jun 2018 13:54)  HR: 80 (01 Jun 2018 13:54) (80 - 80)  BP: 109/67 (01 Jun 2018 13:54) (109/67 - 109/67)  BP(mean): --  RR: 16 (01 Jun 2018 13:54) (16 - 16)  SpO2: 97% (01 Jun 2018 13:54) (97% - 97%)      PHYSICAL EXAM:  Constitutional:Well-developed, well nourished  Eyes:ATILIO, EOMI  Ear/Nose/Throat: no oral lesion, no sinus tenderness on percussion	  Neck:no JVD, no lymphadenopathy, supple  Respiratory: CTA kathleen  Cardiovascular: S1S2 RRR, no murmurs  Gastrointestinal:soft, (+) BS, no HSM  Extremities:no e/e/c  Vascular: DP Pulse:	right normal; left normal            LABS:                        9.0    13.9  )-----------( 458      ( 01 Jun 2018 14:54 )             27.6     06-01    137  |  98  |  14  ----------------------------<  101<H>  4.4   |  23  |  0.98    Ca    9.2      01 Jun 2018 14:54    TPro  7.2  /  Alb  3.8  /  TBili  0.4  /  DBili  x   /  AST  21  /  ALT  28  /  AlkPhos  82  06-01          MICROBIOLOGY:  RADIOLOGY & ADDITIONAL STUDIES: HPI: 68 yo male with spinal stenosis who underwent L4-S1 fusion and L4-5 laminectomy on 5/16, presenting with two days of purulent discharge from surgical site following suture removal. He denies fever or chills. b/l LE weakness (present prior to surgery) is at baseline. No incontinence.       PAST MEDICAL & SURGICAL HISTORY:  Aortic thromboembolism  Childhood asthma  Lower back pain  PVD (peripheral vascular disease)  Hepatitis B  AAA (abdominal aortic aneurysm)  GERD (gastroesophageal reflux disease)  Prostate cancer: Radiation [place]  Bladder cancer  Coronary artery disease  PAD (peripheral artery disease)  Hyperlipidemia  Hypertension  H/O laminectomy: x 4 yrs  Surgery, elective: Peripheral stents in both legs 2 -left, 1- right  S/P CABG (coronary artery bypass graft): 2003        REVIEW OF SYSTEMS:    General:	 no weakness; no fevers, no chills  Skin/Breast: no rash  Respiratory and Thorax: no SOB, no cough  Cardiovascular:	No chest pain  Gastrointestinal:	 no nausea, vomiting , diarrhea  Genitourinary:	no dysuria, no difficulty urinating, no hematuria  Musculoskeletal:	no weakness, no joint swelling/pain  Neurological:	no focal weakness/numbness  Endocrine:	no polyuria, no polydipsia      ANTIBIOTICS:  MEDICATIONS  (STANDING):    MEDICATIONS  (PRN):      Allergies    penicillin (Hives)    Intolerances        SOCIAL HISTORY:    FAMILY HISTORY:      Vital Signs Last 24 Hrs  T(C): 37.2 (01 Jun 2018 13:54), Max: 37.2 (01 Jun 2018 13:54)  T(F): 98.9 (01 Jun 2018 13:54), Max: 98.9 (01 Jun 2018 13:54)  HR: 80 (01 Jun 2018 13:54) (80 - 80)  BP: 109/67 (01 Jun 2018 13:54) (109/67 - 109/67)  BP(mean): --  RR: 16 (01 Jun 2018 13:54) (16 - 16)  SpO2: 97% (01 Jun 2018 13:54) (97% - 97%)    PHYSICAL EXAM:  Constitutional:Well-developed, well nourished  Eyes:ATILIO, EOMI  Ear/Nose/Throat: no oral lesion, no sinus tenderness on percussion	  Neck:no JVD, no lymphadenopathy, supple  Respiratory: CTA kathleen  Cardiovascular: S1S2 RRR, no murmurs  Gastrointestinal:soft, (+) BS, no HSM  Extremities:no e/e/c  Skin: midline lumbar incision with purulent drainage and surrounding erythema  Neuro: LE strength grossly intact  Vascular: DP Pulse:	right normal; left normal            LABS:                        9.0    13.9  )-----------( 458      ( 01 Jun 2018 14:54 )             27.6     06-01    137  |  98  |  14  ----------------------------<  101<H>  4.4   |  23  |  0.98    Ca    9.2      01 Jun 2018 14:54    TPro  7.2  /  Alb  3.8  /  TBili  0.4  /  DBili  x   /  AST  21  /  ALT  28  /  AlkPhos  82  06-01          MICROBIOLOGY: pending   RADIOLOGY & ADDITIONAL STUDIES: reviewed

## 2018-06-01 NOTE — H&P ADULT - PROBLEM SELECTOR PLAN 1
- Admit to neurosurgery telemetry bed   - Q2 neuro checks   - ID consult for empiric antibiotic recommendations  - MRI Lumbar spine with and without contrast   - Wound culture   - Inflammatory labs: ESR, CRP   - D/w Dr. Alvarez

## 2018-06-01 NOTE — ED PROVIDER NOTE - OBJECTIVE STATEMENT
66 yo m with PMH of asthma, HLD, HTN, GERD, hep B, CAD s/p lumbar laminectomy on 5/16 now with wound drainage from incision since having staples removed three days prior. Pt complains of pain over skin area.  Sent by Antonio for evaluation.  Denies fever or chills.  No lower ext weakness or numbness.  No difficulty ambulating.  Pain in legs improving since surgery pt only complains of pain over skin area.

## 2018-06-01 NOTE — CONSULT NOTE ADULT - SUBJECTIVE AND OBJECTIVE BOX
HISTORY OF PRESENT ILLNESS:   HPI: Pt is s/p L4-L5 laminectomy with medial facetectomy and L4-S1 fusion on 5/16 with Dr. Alvarez for lumbar stenosis. He was seen in the office on Tuesday 5/29 for follow up appointment and staples were removed - wound appeared to be healing well at the time. Pt now  presents for low back pain and purulent drainage from wound since yesterday. Pt denies fever, chills, n/v/d, HA or blurry vision. He has no other signs of infection. Reports his LE weakness has improved since pre-op.       PAST MEDICAL & SURGICAL HISTORY:  Aortic thromboembolism  Childhood asthma  Lower back pain  PVD (peripheral vascular disease)  Hepatitis B  AAA (abdominal aortic aneurysm)  GERD (gastroesophageal reflux disease)  Prostate cancer: Radiation [place]  Bladder cancer  Coronary artery disease  PAD (peripheral artery disease)  Hyperlipidemia  Hypertension  H/O laminectomy: x 4 yrs  Surgery, elective: Peripheral stents in both legs 2 -left, 1- right  S/P CABG (coronary artery bypass graft): 2003    Home Medications:  albuterol 90 mcg/inh inhalation aerosol: 2 puff(s) inhaled every 8 hours, As Needed (16 May 2018 06:10)  ALPRAZolam 1 mg oral tablet: 1 tab(s) orally 3 times a day, As Needed (16 May 2018 06:10)  amLODIPine 10 mg oral tablet: 1 tab(s) orally once a day (18 May 2018 11:48)  Aspirin Enteric Coated 81 mg oral delayed release tablet: 1 tab(s) orally once a day (16 May 2018 06:10)  atorvastatin 40 mg oral tablet: 1 tab(s) orally once a day (at bedtime) (16 May 2018 06:10)  docusate sodium 100 mg oral capsule: 1 cap(s) orally 3 times a day (18 May 2018 11:48)  Exforge 10 mg-320 mg oral tablet: 1 tab(s) orally once a day (16 May 2018 06:10)  Flonase 50 mcg/inh nasal spray: 1 spray(s) nasal once a day (16 May 2018 06:10)  NexIUM 40 mg oral delayed release capsule: 1 cap(s) orally once a day (16 May 2018 06:10)  senna oral tablet: 2 tab(s) orally once a day (at bedtime) (18 May 2018 11:48)  Viagra 50 mg oral tablet: 1 tab(s) orally once a day, As Needed (16 May 2018 06:10)    Allergies    penicillin (Hives)    Intolerances        REVIEW OF SYSTEMS  General: denies fever, chills  Skin/Breast: redness, pain and drainage from lumbar wound   Ophthalmologic: denies blurry vision, double vision  ENMT: Denies throat pain   Respiratory and Thorax: denies SOB, difficulty breathing   Cardiovascular: denies CP   Gastrointestinal: denies abd pain, n/v/d  Genitourinary: denies dysuria  Musculoskeletal: denies muscle cramping, stiffness  Neurological: denies numbness, weakness, dizziness  Other: denies recent trauma      MEDICATIONS:  Antibiotics:    Neuro:  oxyCODONE    5 mG/acetaminophen 325 mG 2 Tablet(s) Oral Once    Anticoagulation:    OTHER:    IVF:      Vital Signs Last 24 Hrs  T(C): 37.2 (01 Jun 2018 13:54), Max: 37.2 (01 Jun 2018 13:54)  T(F): 98.9 (01 Jun 2018 13:54), Max: 98.9 (01 Jun 2018 13:54)  HR: 80 (01 Jun 2018 13:54) (80 - 80)  BP: 109/67 (01 Jun 2018 13:54) (109/67 - 109/67)  BP(mean): --  RR: 16 (01 Jun 2018 13:54) (16 - 16)  SpO2: 97% (01 Jun 2018 13:54) (97% - 97%)    PHYSICAL EXAM:  Constitutional: AOx3, NAD, normal affect, normal speech   Eyes: PERRL, EOMI, visual fields intact  Neck: neck non-TTP  Back: Lumbar incisional drainage appears purulent, tender to palpation, surrounding erythema, mildly raised. No induration or   Respiratory: unlabored breathing on RA   Cardiovascular: regular rate  Gastrointestinal: abd soft, NT, ND   Genitourinary: no patrick in place   Vascular: distal pulses 2+   Neurological: face symmetric, CN II-XII intact, strength 5/5 throughout, SILT     LABS:                        9.0    13.9  )-----------( 458      ( 01 Jun 2018 14:54 )             27.6               CULTURES:      RADIOLOGY & ADDITIONAL STUDIES:    Assessment:  67y Male s/p L4-L5 laminectomy and medial facetectomy with L4-S1 fusion on 5/16, presents to ED for purulent drainage and lumbar spine tenderness x2 days.     Plan:  - Admit to neurosurgery telemetry bed   - Q2 neuro checks   - ID consult for empiric antibiotic recommendations  - MRI Lumbar spine with and without contrast   - Wound culture   - Inflammatory labs: ESR, CRP   - D/w Dr. Alvarez

## 2018-06-01 NOTE — H&P ADULT - HISTORY OF PRESENT ILLNESS
HPI: Pt is s/p L4-L5 laminectomy with medial facetectomy and L4-S1 fusion on 5/16 with Dr. Alvarez for lumbar stenosis. He was seen in the office on Tuesday 5/29 for follow up appointment and staples were removed - wound appeared to be healing well at the time. Pt now  presents for low back pain and purulent drainage from wound since yesterday. Pt denies fever, chills, n/v/d, HA or blurry vision. He has no other signs of infection. Reports his LE weakness has improved since pre-op.

## 2018-06-01 NOTE — CONSULT NOTE ADULT - ATTENDING COMMENTS
66 yo male with spinal stenosis who underwent L4-S1 fusion and L4-5 laminectomy on 5/16, presenting with two days of purulent discharge from surgical site following suture removal c/w SSI.  - f/u culture data  - agree with MRI L spine to gauge depth of infection  - check baseline ESR and CRP  - start vancomycin 15mg/kg IV q12h (awaiting weight) + cefepime 2g IV q8h

## 2018-06-01 NOTE — ED ADULT TRIAGE NOTE - OTHER COMPLAINTS
pt c.o yellowish drainage from surgical site. spinal sx completed on 5/16. denies pain, no fever/chills.

## 2018-06-01 NOTE — H&P ADULT - NSHPPHYSICALEXAM_GEN_ALL_CORE
PHYSICAL EXAM:  Constitutional: AOx3, NAD, normal affect, normal speech   Eyes: PERRL, EOMI, visual fields intact  Neck: neck non-TTP  Back: Lumbar incisional drainage appears purulent, tender to palpation, surrounding erythema, mildly raised. No induration  Respiratory: unlabored breathing on RA   Cardiovascular: regular rate  Gastrointestinal: abd soft, NT, ND   Genitourinary: no patrick in place   Vascular: distal pulses 2+   Neurological: face symmetric, CN II-XII intact, strength 5/5 throughout, SILT

## 2018-06-01 NOTE — ED ADULT NURSE NOTE - OBJECTIVE STATEMENT
66 y/o male c/o purulent drainage, redness near surgical incision site on lower back. pt had spinal surgery 5/16/18, released 5/17/18. denies any fever/chills, sob, chest pain, n/v/d. NAD, VSS. incision site noted to have redness and yellow discharge.

## 2018-06-01 NOTE — H&P ADULT - ASSESSMENT
67y Male s/p L4-L5 laminectomy and medial facetectomy with L4-S1 fusion on 5/16, presents to ED for purulent drainage and lumbar spine tenderness x2 days.

## 2018-06-01 NOTE — ED PROVIDER NOTE - MEDICAL DECISION MAKING DETAILS
pt with incisional wound redness, yellowish drainage s/p laminectomy on 5/16, afebrile no chills, blood work, culture from wound, sed rate, crp, MRI as per NS will admit for further mgmt.

## 2018-06-02 LAB
ANION GAP SERPL CALC-SCNC: 14 MMOL/L — SIGNIFICANT CHANGE UP (ref 5–17)
BUN SERPL-MCNC: 12 MG/DL — SIGNIFICANT CHANGE UP (ref 7–23)
CALCIUM SERPL-MCNC: 8.9 MG/DL — SIGNIFICANT CHANGE UP (ref 8.4–10.5)
CHLORIDE SERPL-SCNC: 98 MMOL/L — SIGNIFICANT CHANGE UP (ref 96–108)
CO2 SERPL-SCNC: 24 MMOL/L — SIGNIFICANT CHANGE UP (ref 22–31)
CREAT SERPL-MCNC: 0.91 MG/DL — SIGNIFICANT CHANGE UP (ref 0.5–1.3)
GLUCOSE SERPL-MCNC: 95 MG/DL — SIGNIFICANT CHANGE UP (ref 70–99)
GRAM STN FLD: SIGNIFICANT CHANGE UP
HCT VFR BLD CALC: 28.1 % — LOW (ref 39–50)
HGB BLD-MCNC: 9.3 G/DL — LOW (ref 13–17)
MAGNESIUM SERPL-MCNC: 2.1 MG/DL — SIGNIFICANT CHANGE UP (ref 1.6–2.6)
MCHC RBC-ENTMCNC: 30.4 PG — SIGNIFICANT CHANGE UP (ref 27–34)
MCHC RBC-ENTMCNC: 33.1 G/DL — SIGNIFICANT CHANGE UP (ref 32–36)
MCV RBC AUTO: 91.8 FL — SIGNIFICANT CHANGE UP (ref 80–100)
PHOSPHATE SERPL-MCNC: 3.4 MG/DL — SIGNIFICANT CHANGE UP (ref 2.5–4.5)
PLATELET # BLD AUTO: 403 K/UL — HIGH (ref 150–400)
POTASSIUM SERPL-MCNC: 4.6 MMOL/L — SIGNIFICANT CHANGE UP (ref 3.5–5.3)
POTASSIUM SERPL-SCNC: 4.6 MMOL/L — SIGNIFICANT CHANGE UP (ref 3.5–5.3)
RBC # BLD: 3.06 M/UL — LOW (ref 4.2–5.8)
RBC # FLD: 14.6 % — SIGNIFICANT CHANGE UP (ref 10.3–16.9)
SODIUM SERPL-SCNC: 136 MMOL/L — SIGNIFICANT CHANGE UP (ref 135–145)
SPECIMEN SOURCE: SIGNIFICANT CHANGE UP
WBC # BLD: 10.5 K/UL — SIGNIFICANT CHANGE UP (ref 3.8–10.5)
WBC # FLD AUTO: 10.5 K/UL — SIGNIFICANT CHANGE UP (ref 3.8–10.5)

## 2018-06-02 PROCEDURE — 72148 MRI LUMBAR SPINE W/O DYE: CPT | Mod: 26

## 2018-06-02 PROCEDURE — 99232 SBSQ HOSP IP/OBS MODERATE 35: CPT

## 2018-06-02 RX ORDER — CEFEPIME 1 G/1
2000 INJECTION, POWDER, FOR SOLUTION INTRAMUSCULAR; INTRAVENOUS EVERY 8 HOURS
Qty: 0 | Refills: 0 | Status: DISCONTINUED | OUTPATIENT
Start: 2018-06-02 | End: 2018-06-03

## 2018-06-02 RX ORDER — CEFEPIME 1 G/1
2000 INJECTION, POWDER, FOR SOLUTION INTRAMUSCULAR; INTRAVENOUS EVERY 8 HOURS
Qty: 0 | Refills: 0 | Status: DISCONTINUED | OUTPATIENT
Start: 2018-06-02 | End: 2018-06-02

## 2018-06-02 RX ADMIN — OXYCODONE AND ACETAMINOPHEN 2 TABLET(S): 5; 325 TABLET ORAL at 10:12

## 2018-06-02 RX ADMIN — ATORVASTATIN CALCIUM 40 MILLIGRAM(S): 80 TABLET, FILM COATED ORAL at 22:39

## 2018-06-02 RX ADMIN — OXYCODONE AND ACETAMINOPHEN 2 TABLET(S): 5; 325 TABLET ORAL at 18:48

## 2018-06-02 RX ADMIN — PANTOPRAZOLE SODIUM 40 MILLIGRAM(S): 20 TABLET, DELAYED RELEASE ORAL at 05:52

## 2018-06-02 RX ADMIN — Medication 166.67 MILLIGRAM(S): at 12:44

## 2018-06-02 RX ADMIN — OXYCODONE AND ACETAMINOPHEN 2 TABLET(S): 5; 325 TABLET ORAL at 12:15

## 2018-06-02 RX ADMIN — Medication 100 MILLIGRAM(S): at 13:27

## 2018-06-02 RX ADMIN — AMLODIPINE BESYLATE 10 MILLIGRAM(S): 2.5 TABLET ORAL at 10:12

## 2018-06-02 RX ADMIN — Medication 100 MILLIGRAM(S): at 05:51

## 2018-06-02 RX ADMIN — CEFEPIME 2000 MILLIGRAM(S): 1 INJECTION, POWDER, FOR SOLUTION INTRAMUSCULAR; INTRAVENOUS at 22:39

## 2018-06-02 RX ADMIN — Medication 650 MILLIGRAM(S): at 00:39

## 2018-06-02 RX ADMIN — OXYCODONE AND ACETAMINOPHEN 2 TABLET(S): 5; 325 TABLET ORAL at 19:48

## 2018-06-02 RX ADMIN — Medication 1 SPRAY(S): at 13:27

## 2018-06-02 RX ADMIN — Medication 166.67 MILLIGRAM(S): at 23:50

## 2018-06-02 RX ADMIN — CEFEPIME 100 MILLIGRAM(S): 1 INJECTION, POWDER, FOR SOLUTION INTRAMUSCULAR; INTRAVENOUS at 05:51

## 2018-06-02 RX ADMIN — Medication 166.67 MILLIGRAM(S): at 00:41

## 2018-06-02 RX ADMIN — ENOXAPARIN SODIUM 40 MILLIGRAM(S): 100 INJECTION SUBCUTANEOUS at 22:48

## 2018-06-02 RX ADMIN — Medication 100 MILLIGRAM(S): at 22:39

## 2018-06-02 RX ADMIN — CEFEPIME 100 MILLIGRAM(S): 1 INJECTION, POWDER, FOR SOLUTION INTRAMUSCULAR; INTRAVENOUS at 14:50

## 2018-06-02 NOTE — PROGRESS NOTE ADULT - SUBJECTIVE AND OBJECTIVE BOX
HPI:  HPI: Pt is s/p L4-L5 laminectomy with medial facetectomy and L4-S1 fusion on 5/16 with Dr. Alvarez for lumbar stenosis. He was seen in the office on Tuesday 5/29 for follow up appointment and staples were removed - wound appeared to be healing well at the time. Pt now  presents for low back pain and purulent drainage from wound since yesterday. Pt denies fever, chills, n/v/d, HA or blurry vision. He has no other signs of infection. Reports his LE weakness has improved since pre-op. (01 Jun 2018 18:02)      Hospital course:   6/1 HD #1: Pt admitted for IV abx and wound care. Started on vanc and cefepime per ID   6/2 HD#2: Wound cx growing MRSA. Blood cx NGTD. Pt still afebrile. MRI Lumbar spine obtained today - f/u read    OVERNIGHT EVENTS:   Denies acute changes to vision, weakness or loss of sensation. Afebrile.     Vital Signs Last 24 Hrs  T(C): 37.6 (02 Jun 2018 14:53), Max: 38.1 (01 Jun 2018 23:35)  T(F): 99.6 (02 Jun 2018 14:53), Max: 100.5 (01 Jun 2018 23:35)  HR: 85 (02 Jun 2018 14:53) (80 - 90)  BP: 104/53 (02 Jun 2018 14:53) (100/64 - 125/73)  BP(mean): --  RR: 16 (02 Jun 2018 14:53) (16 - 17)  SpO2: 97% (02 Jun 2018 14:53) (94% - 97%)    I&O's Summary    01 Jun 2018 07:01  -  02 Jun 2018 07:00  --------------------------------------------------------  IN: 250 mL / OUT: 500 mL / NET: -250 mL    02 Jun 2018 07:01  -  02 Jun 2018 15:52  --------------------------------------------------------  IN: 840 mL / OUT: 400 mL / NET: 440 mL        PHYSICAL EXAM:  Neurological: AOx3, NAD, FC, speech coherent   CN II-XII: EOMI, PERRL, face symmetric, tongue midline   Motor: MAEx4 5/5 UE and LE B/L   SILT throughout  WWP throughout   No pronator drift   Lumbar incision site: purulent drainage, gauze in place, tender to palpation, surrounding erythema, mildly edematous. No induration.  Cardiovascular: regular rate   Respiratory: unlabored breathing on RA   Gastrointestinal: abd soft, NT, ND   Genitourinary: no patrick in place   Extremities: no LE edema     TUBES/LINES:  [] Patrick  [] Lumbar Drain  [] Wound Drains  [] Others       DIET:  [] NPO  [x] Mechanical  [] Tube feeds    LABS:                        9.3    10.5  )-----------( 403      ( 02 Jun 2018 12:35 )             28.1     06-02    136  |  98  |  12  ----------------------------<  95  4.6   |  24  |  0.91    Ca    8.9      02 Jun 2018 12:35  Phos  3.4     06-02  Mg     2.1     06-02    TPro  7.2  /  Alb  3.8  /  TBili  0.4  /  DBili  x   /  AST  21  /  ALT  28  /  AlkPhos  82  06-01            CAPILLARY BLOOD GLUCOSE          Drug Levels: [] N/A    CSF Analysis: [] N/A      Allergies    penicillin (Hives)    Intolerances        Home Medications:  albuterol 90 mcg/inh inhalation aerosol: 2 puff(s) inhaled every 8 hours, As Needed (16 May 2018 06:10)  ALPRAZolam 1 mg oral tablet: 1 tab(s) orally 3 times a day, As Needed (16 May 2018 06:10)  amLODIPine 10 mg oral tablet: 1 tab(s) orally once a day (18 May 2018 11:48)  Aspirin Enteric Coated 81 mg oral delayed release tablet: 1 tab(s) orally once a day (16 May 2018 06:10)  atorvastatin 40 mg oral tablet: 1 tab(s) orally once a day (at bedtime) (16 May 2018 06:10)  docusate sodium 100 mg oral capsule: 1 cap(s) orally 3 times a day (18 May 2018 11:48)  Exforge 10 mg-320 mg oral tablet: 1 tab(s) orally once a day (16 May 2018 06:10)  Flonase 50 mcg/inh nasal spray: 1 spray(s) nasal once a day (16 May 2018 06:10)  NexIUM 40 mg oral delayed release capsule: 1 cap(s) orally once a day (16 May 2018 06:10)  senna oral tablet: 2 tab(s) orally once a day (at bedtime) (18 May 2018 11:48)  Viagra 50 mg oral tablet: 1 tab(s) orally once a day, As Needed (16 May 2018 06:10)      MEDICATIONS:  Antibiotics:  cefepime   IVPB 2000 milliGRAM(s) IV Intermittent every 8 hours  vancomycin  IVPB 1250 milliGRAM(s) IV Intermittent every 12 hours  vancomycin  IVPB        Neuro:  acetaminophen   Tablet 650 milliGRAM(s) Oral every 6 hours PRN  acetaminophen   Tablet. 650 milliGRAM(s) Oral every 6 hours PRN  ALPRAZolam 1 milliGRAM(s) Oral three times a day PRN  methocarbamol 750 milliGRAM(s) Oral every 8 hours PRN  ondansetron Injectable 4 milliGRAM(s) IV Push every 6 hours PRN  oxyCODONE    5 mG/acetaminophen 325 mG 2 Tablet(s) Oral every 4 hours PRN    Anticoagulation:  enoxaparin Injectable 40 milliGRAM(s) SubCutaneous at bedtime    OTHER:  ALBUTerol    90 MICROgram(s) HFA Inhaler 2 Puff(s) Inhalation every 6 hours PRN  amLODIPine   Tablet 10 milliGRAM(s) Oral daily  atorvastatin 40 milliGRAM(s) Oral at bedtime  docusate sodium 100 milliGRAM(s) Oral three times a day  fluticasone propionate (50 MICROgram(s)/actuation) Nasal Spray - Peds 1 Spray(s) Alternating Nostrils daily  pantoprazole    Tablet 40 milliGRAM(s) Oral before breakfast  senna 2 Tablet(s) Oral at bedtime PRN    IVF:    CULTURES:  Culture Results:   Numerous Methicillin resistant Staphylococcus aureus  presumptive Methicillin resistant  Confirmation to follow within 24 hours  Result called to and read back byUrbano Zhou RN  06/02/2018 14:24:48 (06-01 @ 16:52)  Culture Results:   No growth at 12 hours (06-01 @ 16:35)    RADIOLOGY & ADDITIONAL TESTS:      ASSESSMENT:  67y Male s/p L4-L5 laminectomy and medial facetectomy with L4-S1 fusion on 5/16, presents to ED for purulent drainage and lumbar spine tenderness x2 days.     COLITIS  Handoff  MEWS Score  Aortic thromboembolism  Childhood asthma  Lower back pain  PVD (peripheral vascular disease)  Hepatitis B  AAA (abdominal aortic aneurysm)  GERD (gastroesophageal reflux disease)  Prostate cancer  Bladder cancer  Coronary artery disease  PAD (peripheral artery disease)  Hyperlipidemia  Hypertension  Wound infection  Wound infection  H/O laminectomy  Surgery, elective  S/P CABG (coronary artery bypass graft)  H/O total cystectomy  WOUND CHECK  14      PLAN:  NEURO:  - Q4 neuro checks   - Cont. vancomycin and cefepime per ID   - F/u final cultures: MRSA wound, blood NGTD   - Vanc trough tomorrow before 12pm dose   - F/u MRI L-spine   - Pain control     CARDIOVASCULAR:  - BP goal: normotension   - Cont. home meds - norvasc and lipitor     PULMONARY:  - IS     RENAL:  - I&Os     GI:  - Bowel regimen     HEME:  - Trend H/H     ID:  - Monitor for fevers     ENDO:  - ISS     DVT PROPHYLAXIS:  [x] Venodynes                                [x] Lovenox    DISPOSITION:  - Floor status   - Full code   - Dispo: pending resolution of infection  - D/w Dr. Alvarez

## 2018-06-02 NOTE — PROGRESS NOTE ADULT - SUBJECTIVE AND OBJECTIVE BOX
INTERVAL HPI/OVERNIGHT EVENTS: Low-grade fever. Getting MRI L spine.     CONSTITUTIONAL:  Negative fever or chills, feels well, good appetite  EYES:  Negative  blurry vision or double vision  CARDIOVASCULAR:  Negative for chest pain or palpitations  RESPIRATORY:  Negative for cough, wheezing, or SOB   GASTROINTESTINAL:  Negative for nausea, vomiting, diarrhea, constipation, or abdominal pain  GENITOURINARY:  Negative frequency, urgency or dysuria  NEUROLOGIC:  No headache, confusion, dizziness, lightheadedness      ANTIBIOTICS/RELEVANT:    MEDICATIONS  (STANDING):  amLODIPine   Tablet 10 milliGRAM(s) Oral daily  atorvastatin 40 milliGRAM(s) Oral at bedtime  cefepime   IVPB 2000 milliGRAM(s) IV Intermittent every 8 hours  docusate sodium 100 milliGRAM(s) Oral three times a day  enoxaparin Injectable 40 milliGRAM(s) SubCutaneous at bedtime  fluticasone propionate (50 MICROgram(s)/actuation) Nasal Spray - Peds 1 Spray(s) Alternating Nostrils daily  pantoprazole    Tablet 40 milliGRAM(s) Oral before breakfast  sildenafil (REVATIO) 50 milliGRAM(s) Oral daily  vancomycin  IVPB 1250 milliGRAM(s) IV Intermittent every 12 hours  vancomycin  IVPB        MEDICATIONS  (PRN):  acetaminophen   Tablet 650 milliGRAM(s) Oral every 6 hours PRN For Temp greater than 38 C (100.4 F)  acetaminophen   Tablet. 650 milliGRAM(s) Oral every 6 hours PRN Mild Pain (1 - 3)  ALBUTerol    90 MICROgram(s) HFA Inhaler 2 Puff(s) Inhalation every 6 hours PRN Wheezing  ALPRAZolam 1 milliGRAM(s) Oral three times a day PRN anxiety  methocarbamol 750 milliGRAM(s) Oral every 8 hours PRN Spasms/Stiffness  ondansetron Injectable 4 milliGRAM(s) IV Push every 6 hours PRN Nausea and/or Vomiting  oxyCODONE    5 mG/acetaminophen 325 mG 2 Tablet(s) Oral every 4 hours PRN Severe Pain (7 - 10)  senna 2 Tablet(s) Oral at bedtime PRN Constipation        Vital Signs Last 24 Hrs  T(C): 37.1 (02 Jun 2018 09:15), Max: 38.1 (01 Jun 2018 23:35)  T(F): 98.8 (02 Jun 2018 09:15), Max: 100.5 (01 Jun 2018 23:35)  HR: 90 (02 Jun 2018 09:15) (80 - 90)  BP: 122/57 (02 Jun 2018 09:15) (100/64 - 125/73)  BP(mean): --  RR: 17 (02 Jun 2018 09:15) (16 - 17)  SpO2: 95% (02 Jun 2018 09:15) (94% - 96%)    PHYSICAL EXAM:  Constitutional:Well-developed, well nourished  Eyes:ATILIO, EOMI  Ear/Nose/Throat: no oral lesion, no sinus tenderness on percussion	  Neck:no JVD, no lymphadenopathy, supple  Respiratory: CTA kathleen  Cardiovascular: S1S2 RRR, no murmurs  Gastrointestinal:soft, (+) BS, no HSM  Extremities:no e/e/c  Skin: lumbar incision with purulent drainage and surrounding erythema  Vascular: DP Pulse:	right normal; left normal      LABS:                        9.3    10.5  )-----------( 403      ( 02 Jun 2018 12:35 )             28.1     06-02    136  |  98  |  12  ----------------------------<  95  4.6   |  24  |  0.91    Ca    8.9      02 Jun 2018 12:35  Phos  3.4     06-02  Mg     2.1     06-02    TPro  7.2  /  Alb  3.8  /  TBili  0.4  /  DBili  x   /  AST  21  /  ALT  28  /  AlkPhos  82  06-01          MICROBIOLOGY: Culture - Surgical Swab (06.01.18 @ 16:52)    Gram Stain:   Few Gram Positive Cocci in Clusters  Moderate White blood cells    Specimen Source: .Surgical Swab lumbar incision    Culture Results:   Numerous Methicillin resistant Staphylococcus aureus  presumptive Methicillin resistant  Confirmation to follow within 24 hours  Result called to and read back byUrbano Zhou RN  06/02/2018 14:24:48    Culture - Blood (06.01.18 @ 16:35)    Specimen Source: .Blood Blood    Culture Results:   No growth at 12 hours        RADIOLOGY & ADDITIONAL STUDIES: awaiting MRI L spine read

## 2018-06-03 LAB
-  CEFAZOLIN: SIGNIFICANT CHANGE UP
-  CLINDAMYCIN: SIGNIFICANT CHANGE UP
-  DAPTOMYCIN: SIGNIFICANT CHANGE UP
-  ERYTHROMYCIN: SIGNIFICANT CHANGE UP
-  LINEZOLID: SIGNIFICANT CHANGE UP
-  OXACILLIN: SIGNIFICANT CHANGE UP
-  PENICILLIN: SIGNIFICANT CHANGE UP
-  RIFAMPIN: SIGNIFICANT CHANGE UP
-  TETRACYCLINE: SIGNIFICANT CHANGE UP
-  TRIMETHOPRIM/SULFAMETHOXAZOLE: SIGNIFICANT CHANGE UP
-  VANCOMYCIN: SIGNIFICANT CHANGE UP
ANION GAP SERPL CALC-SCNC: 15 MMOL/L — SIGNIFICANT CHANGE UP (ref 5–17)
BUN SERPL-MCNC: 10 MG/DL — SIGNIFICANT CHANGE UP (ref 7–23)
CALCIUM SERPL-MCNC: 8.7 MG/DL — SIGNIFICANT CHANGE UP (ref 8.4–10.5)
CHLORIDE SERPL-SCNC: 100 MMOL/L — SIGNIFICANT CHANGE UP (ref 96–108)
CO2 SERPL-SCNC: 22 MMOL/L — SIGNIFICANT CHANGE UP (ref 22–31)
CREAT SERPL-MCNC: 0.8 MG/DL — SIGNIFICANT CHANGE UP (ref 0.5–1.3)
CULTURE RESULTS: SIGNIFICANT CHANGE UP
GLUCOSE SERPL-MCNC: 101 MG/DL — HIGH (ref 70–99)
GRAM STN FLD: SIGNIFICANT CHANGE UP
HCT VFR BLD CALC: 27.5 % — LOW (ref 39–50)
HGB BLD-MCNC: 9.1 G/DL — LOW (ref 13–17)
MAGNESIUM SERPL-MCNC: 2.2 MG/DL — SIGNIFICANT CHANGE UP (ref 1.6–2.6)
MCHC RBC-ENTMCNC: 29.9 PG — SIGNIFICANT CHANGE UP (ref 27–34)
MCHC RBC-ENTMCNC: 33.1 G/DL — SIGNIFICANT CHANGE UP (ref 32–36)
MCV RBC AUTO: 90.5 FL — SIGNIFICANT CHANGE UP (ref 80–100)
METHOD TYPE: SIGNIFICANT CHANGE UP
MRSA SPEC QL CULT: SIGNIFICANT CHANGE UP
ORGANISM # SPEC MICROSCOPIC CNT: SIGNIFICANT CHANGE UP
PHOSPHATE SERPL-MCNC: 3.5 MG/DL — SIGNIFICANT CHANGE UP (ref 2.5–4.5)
PLATELET # BLD AUTO: 367 K/UL — SIGNIFICANT CHANGE UP (ref 150–400)
POTASSIUM SERPL-MCNC: 4.2 MMOL/L — SIGNIFICANT CHANGE UP (ref 3.5–5.3)
POTASSIUM SERPL-SCNC: 4.2 MMOL/L — SIGNIFICANT CHANGE UP (ref 3.5–5.3)
RBC # BLD: 3.04 M/UL — LOW (ref 4.2–5.8)
RBC # FLD: 14.6 % — SIGNIFICANT CHANGE UP (ref 10.3–16.9)
SODIUM SERPL-SCNC: 137 MMOL/L — SIGNIFICANT CHANGE UP (ref 135–145)
SPECIMEN SOURCE: SIGNIFICANT CHANGE UP
VANCOMYCIN TROUGH SERPL-MCNC: 11 UG/ML — SIGNIFICANT CHANGE UP (ref 10–20)
WBC # BLD: 9 K/UL — SIGNIFICANT CHANGE UP (ref 3.8–10.5)
WBC # FLD AUTO: 9 K/UL — SIGNIFICANT CHANGE UP (ref 3.8–10.5)

## 2018-06-03 PROCEDURE — 99232 SBSQ HOSP IP/OBS MODERATE 35: CPT

## 2018-06-03 PROCEDURE — 71045 X-RAY EXAM CHEST 1 VIEW: CPT | Mod: 26

## 2018-06-03 PROCEDURE — 93010 ELECTROCARDIOGRAM REPORT: CPT

## 2018-06-03 RX ORDER — SODIUM CHLORIDE 9 MG/ML
1000 INJECTION INTRAMUSCULAR; INTRAVENOUS; SUBCUTANEOUS
Qty: 0 | Refills: 0 | Status: DISCONTINUED | OUTPATIENT
Start: 2018-06-04 | End: 2018-06-04

## 2018-06-03 RX ORDER — VANCOMYCIN HCL 1 G
1500 VIAL (EA) INTRAVENOUS EVERY 12 HOURS
Qty: 0 | Refills: 0 | Status: DISCONTINUED | OUTPATIENT
Start: 2018-06-03 | End: 2018-06-07

## 2018-06-03 RX ORDER — ZOLPIDEM TARTRATE 10 MG/1
5 TABLET ORAL AT BEDTIME
Qty: 0 | Refills: 0 | Status: DISCONTINUED | OUTPATIENT
Start: 2018-06-03 | End: 2018-06-07

## 2018-06-03 RX ORDER — VANCOMYCIN HCL 1 G
250 VIAL (EA) INTRAVENOUS ONCE
Qty: 0 | Refills: 0 | Status: COMPLETED | OUTPATIENT
Start: 2018-06-03 | End: 2018-06-03

## 2018-06-03 RX ADMIN — Medication 1 SPRAY(S): at 11:54

## 2018-06-03 RX ADMIN — Medication 100 MILLIGRAM(S): at 05:34

## 2018-06-03 RX ADMIN — Medication 100 MILLIGRAM(S): at 14:51

## 2018-06-03 RX ADMIN — OXYCODONE AND ACETAMINOPHEN 2 TABLET(S): 5; 325 TABLET ORAL at 18:38

## 2018-06-03 RX ADMIN — OXYCODONE AND ACETAMINOPHEN 2 TABLET(S): 5; 325 TABLET ORAL at 22:26

## 2018-06-03 RX ADMIN — AMLODIPINE BESYLATE 10 MILLIGRAM(S): 2.5 TABLET ORAL at 05:34

## 2018-06-03 RX ADMIN — CEFEPIME 2000 MILLIGRAM(S): 1 INJECTION, POWDER, FOR SOLUTION INTRAMUSCULAR; INTRAVENOUS at 05:34

## 2018-06-03 RX ADMIN — OXYCODONE AND ACETAMINOPHEN 2 TABLET(S): 5; 325 TABLET ORAL at 17:38

## 2018-06-03 RX ADMIN — METHOCARBAMOL 750 MILLIGRAM(S): 500 TABLET, FILM COATED ORAL at 13:00

## 2018-06-03 RX ADMIN — Medication 100 MILLIGRAM(S): at 21:34

## 2018-06-03 RX ADMIN — OXYCODONE AND ACETAMINOPHEN 2 TABLET(S): 5; 325 TABLET ORAL at 21:38

## 2018-06-03 RX ADMIN — PANTOPRAZOLE SODIUM 40 MILLIGRAM(S): 20 TABLET, DELAYED RELEASE ORAL at 06:12

## 2018-06-03 RX ADMIN — Medication 166.67 MILLIGRAM(S): at 12:59

## 2018-06-03 RX ADMIN — Medication 250 MILLIGRAM(S): at 14:50

## 2018-06-03 RX ADMIN — ATORVASTATIN CALCIUM 40 MILLIGRAM(S): 80 TABLET, FILM COATED ORAL at 21:34

## 2018-06-03 NOTE — PROGRESS NOTE ADULT - SUBJECTIVE AND OBJECTIVE BOX
HPI:  HPI: Pt is s/p L4-L5 laminectomy with medial facetectomy and L4-S1 fusion on 5/16 with Dr. Alvarez for lumbar stenosis. He was seen in the office on Tuesday 5/29 for follow up appointment and staples were removed - wound appeared to be healing well at the time. Pt now  presents for low back pain and purulent drainage from wound since yesterday. Pt denies fever, chills, n/v/d, HA or blurry vision. He has no other signs of infection. Reports his LE weakness has improved since pre-op. (01 Jun 2018 18:02)    no acute events overnight  moved to isolation precautions room    Hospital course:   6/1 HD #1: Pt admitted for IV abx and wound care. Started on vanc and cefepime per ID   6/2 HD#2: Wound cx growing MRSA. Blood cx NGTD. Pt still afebrile. MRI Lumbar spine obtained today - f/u read  6/3: contact precautions for MRSA    OVERNIGHT EVENTS:  Vital Signs Last 24 Hrs  T(C): 37.3 (03 Jun 2018 04:50), Max: 38.6 (02 Jun 2018 18:45)  T(F): 99.2 (03 Jun 2018 04:50), Max: 101.5 (02 Jun 2018 18:45)  HR: 78 (03 Jun 2018 04:50) (73 - 97)  BP: 105/58 (03 Jun 2018 04:50) (97/55 - 132/62)  BP(mean): --  RR: 16 (03 Jun 2018 04:50) (16 - 18)  SpO2: 94% (03 Jun 2018 04:50) (94% - 97%)    I&O's Summary    02 Jun 2018 07:01  -  03 Jun 2018 07:00  --------------------------------------------------------  IN: 1880 mL / OUT: 1150 mL / NET: 730 mL        PHYSICAL EXAM:  Neurological:  AOx3, NAD, FC, speech coherent   CN II-XII: EOMI, PERRL, face symmetric, tongue midline   Motor: MAEx4 5/5 UE and LE B/L   SILT throughout  WWP throughout   No pronator drift   Lumbar site with scant purulent drainage, gauze changed, mildly TTP, surrounding erythema, mildly edematous. No induration.    TUBES/LINES:  pivs      DIET:  [] NPO  [x] Mechanical  [] Tube feeds    LABS:                        9.1    9.0   )-----------( 367      ( 03 Jun 2018 07:44 )             27.5     06-03    137  |  100  |  10  ----------------------------<  101<H>  4.2   |  22  |  0.80    Ca    8.7      03 Jun 2018 07:44  Phos  3.5     06-03  Mg     2.2     06-03    TPro  7.2  /  Alb  3.8  /  TBili  0.4  /  DBili  x   /  AST  21  /  ALT  28  /  AlkPhos  82  06-01            CAPILLARY BLOOD GLUCOSE          Drug Levels: [] N/A    CSF Analysis: [] N/A      Allergies    penicillin (Hives)    Intolerances      MEDICATIONS:  Antibiotics:  cefepime  Injectable. 2000 milliGRAM(s) IV Push every 8 hours  vancomycin  IVPB 1250 milliGRAM(s) IV Intermittent every 12 hours  vancomycin  IVPB        Neuro:  acetaminophen   Tablet 650 milliGRAM(s) Oral every 6 hours PRN  acetaminophen   Tablet. 650 milliGRAM(s) Oral every 6 hours PRN  ALPRAZolam 1 milliGRAM(s) Oral three times a day PRN  methocarbamol 750 milliGRAM(s) Oral every 8 hours PRN  ondansetron Injectable 4 milliGRAM(s) IV Push every 6 hours PRN  oxyCODONE    5 mG/acetaminophen 325 mG 2 Tablet(s) Oral every 4 hours PRN    Anticoagulation:  enoxaparin Injectable 40 milliGRAM(s) SubCutaneous at bedtime    OTHER:  ALBUTerol    90 MICROgram(s) HFA Inhaler 2 Puff(s) Inhalation every 6 hours PRN  amLODIPine   Tablet 10 milliGRAM(s) Oral daily  atorvastatin 40 milliGRAM(s) Oral at bedtime  docusate sodium 100 milliGRAM(s) Oral three times a day  fluticasone propionate (50 MICROgram(s)/actuation) Nasal Spray - Peds 1 Spray(s) Alternating Nostrils daily  pantoprazole    Tablet 40 milliGRAM(s) Oral before breakfast  senna 2 Tablet(s) Oral at bedtime PRN    IVF:    CULTURES:  Culture Results:   Numerous Methicillin resistant Staphylococcus aureus  Result called to and read back byUrbano Zhou RN  06/02/2018 14:24:48 (06-01 @ 16:52)  Culture Results:   No growth at 1 day. (06-01 @ 16:35)    RADIOLOGY & ADDITIONAL TESTS:      ASSESSMENT:  67y Male s/p L4-L5 laminectomy and medial facetectomy with L4-S1 fusion on 5/16, presents to ED for purulent drainage and lumbar spine tenderness x2 days.     COLITIS  Handoff  MEWS Score  Aortic thromboembolism  Childhood asthma  Lower back pain  PVD (peripheral vascular disease)  Hepatitis B  AAA (abdominal aortic aneurysm)  GERD (gastroesophageal reflux disease)  Prostate cancer  Bladder cancer  Coronary artery disease  PAD (peripheral artery disease)  Hyperlipidemia  Hypertension  Wound infection  Wound infection  H/O laminectomy  Surgery, elective  S/P CABG (coronary artery bypass graft)  H/O total cystectomy  WOUND CHECK  14      PLAN:  NEURO:  cont neuro checks  Pain control     CARDIOVASCULAR:  Cont. home meds - norvasc and lipitor     PULMONARY:  enc IS use     RENAL:  voiding    GI:  Bowel regimen   diet as tolerated    HEME:  stable    ID:  Cont. vancomycin and cefepime per ID   F/u final cultures: MRSA wound, blood NGTD   Vanc trough tomorrow before 12pm dose      ENDO:  ISS    DVT PROPHYLAXIS:  [x] Venodynes                                [x] Lovenox    DISPOSITION:  - Floor status   - Full code   - Dispo: pending resolution of infection

## 2018-06-03 NOTE — PROVIDER CONTACT NOTE (CRITICAL VALUE NOTIFICATION) - BACKGROUND
Patient admitted for wound infection
Patient admitted with back wound infection.
wound infection to back

## 2018-06-03 NOTE — PROGRESS NOTE ADULT - SUBJECTIVE AND OBJECTIVE BOX
INTERVAL HPI/OVERNIGHT EVENTS: Fever overnight. Continued drainage from back wound.     CONSTITUTIONAL:  Negative chills, feels well, good appetite  EYES:  Negative  blurry vision or double vision  CARDIOVASCULAR:  Negative for chest pain or palpitations  RESPIRATORY:  Negative for cough, wheezing, or SOB   GASTROINTESTINAL:  Negative for nausea, vomiting, diarrhea, constipation, or abdominal pain  GENITOURINARY:  Negative frequency, urgency or dysuria  NEUROLOGIC:  No headache, confusion, dizziness, lightheadedness      ANTIBIOTICS/RELEVANT:    MEDICATIONS  (STANDING):  amLODIPine   Tablet 10 milliGRAM(s) Oral daily  atorvastatin 40 milliGRAM(s) Oral at bedtime  docusate sodium 100 milliGRAM(s) Oral three times a day  fluticasone propionate (50 MICROgram(s)/actuation) Nasal Spray - Peds 1 Spray(s) Alternating Nostrils daily  pantoprazole    Tablet 40 milliGRAM(s) Oral before breakfast  vancomycin  IVPB 1250 milliGRAM(s) IV Intermittent every 12 hours  vancomycin  IVPB        MEDICATIONS  (PRN):  acetaminophen   Tablet 650 milliGRAM(s) Oral every 6 hours PRN For Temp greater than 38 C (100.4 F)  acetaminophen   Tablet. 650 milliGRAM(s) Oral every 6 hours PRN Mild Pain (1 - 3)  ALBUTerol    90 MICROgram(s) HFA Inhaler 2 Puff(s) Inhalation every 6 hours PRN Wheezing  ALPRAZolam 1 milliGRAM(s) Oral three times a day PRN anxiety  methocarbamol 750 milliGRAM(s) Oral every 8 hours PRN Spasms/Stiffness  ondansetron Injectable 4 milliGRAM(s) IV Push every 6 hours PRN Nausea and/or Vomiting  oxyCODONE    5 mG/acetaminophen 325 mG 2 Tablet(s) Oral every 4 hours PRN Severe Pain (7 - 10)  senna 2 Tablet(s) Oral at bedtime PRN Constipation        Vital Signs Last 24 Hrs  T(C): 36.8 (03 Jun 2018 09:35), Max: 38.6 (02 Jun 2018 18:45)  T(F): 98.3 (03 Jun 2018 09:35), Max: 101.5 (02 Jun 2018 18:45)  HR: 82 (03 Jun 2018 09:35) (73 - 97)  BP: 113/64 (03 Jun 2018 09:35) (97/55 - 132/62)  BP(mean): --  RR: 17 (03 Jun 2018 09:35) (16 - 18)  SpO2: 97% (03 Jun 2018 09:35) (94% - 97%)    PHYSICAL EXAM:  Constitutional:Well-developed, well nourished  Eyes:ATILIO, EOMI  Ear/Nose/Throat: no oral lesion, no sinus tenderness on percussion	  Neck:no JVD, no lymphadenopathy, supple  Respiratory: CTA kathleen  Cardiovascular: S1S2 RRR, no murmurs  Gastrointestinal:soft, (+) BS, no HSM  Extremities:no e/e/c  Skin: lumbar wound with purulent drainage and surrounding erythema  Vascular: DP Pulse:	right normal; left normal      LABS:                        9.1    9.0   )-----------( 367      ( 03 Jun 2018 07:44 )             27.5     06-03    137  |  100  |  10  ----------------------------<  101<H>  4.2   |  22  |  0.80    Ca    8.7      03 Jun 2018 07:44  Phos  3.5     06-03  Mg     2.2     06-03    TPro  7.2  /  Alb  3.8  /  TBili  0.4  /  DBili  x   /  AST  21  /  ALT  28  /  AlkPhos  82  06-01          MICROBIOLOGY: Culture - Surgical Swab (06.01.18 @ 16:52)    Gram Stain:   Few Gram Positive Cocci in Clusters  Moderate White blood cells    -  Cefazolin: R 16    -  Clindamycin: S <=0.5    -  Daptomycin: S 1    -  Erythromycin: R >4    -  Linezolid: S 4    -  Oxacillin: R >2    -  Penicillin: R >8    -  RIF- Rifampin: S <=1    -  Tetra/Doxy: S <=4    -  Trimethoprim/Sulfamethoxazole: S <=0.5/9.5    -  Vancomycin: S 0.75    Specimen Source: .Surgical Swab lumbar incision    Culture Results:   Numerous Methicillin resistant Staphylococcus aureus  Result called to and read back byUrbano Zhou RN  06/02/2018 14:24:48    Organism Identification: Methicillin resistant Staphylococcus aureus  Methicillin resistant Staphylococcus aureus    Organism: Methicillin resistant Staphylococcus aureus    Organism: Methicillin resistant Staphylococcus aureus    Method Type: ETEST    Method Type: ANAI        Culture - Blood (06.01.18 @ 16:35)    Specimen Source: .Blood Blood    Culture Results:   No growth at 1 day.    Culture - Blood (06.01.18 @ 16:35)    Specimen Source: .Blood Blood    Culture Results:   No growth at 1 day.    RADIOLOGY & ADDITIONAL STUDIES: < from: MR Lumbar Spine No Cont (06.02.18 @ 12:11) >  Impression: Limited evaluation.   Status post posterior fusion of L4, L5 and S1. Posterior cerebral. The   possibility of a superimposed infection cannot be excluded.    Interval appearance of a small posterior subdural fluid collection   spanning from L1/L2-L2/L3 contributing to moderate to severe spinal canal   stenosis.    < end of copied text >

## 2018-06-03 NOTE — PROGRESS NOTE ADULT - ASSESSMENT
68 yo male with spinal stenosis who underwent L4-S1 fusion and L4-5 laminectomy on 5/16, presenting with two days of purulent discharge from surgical site following suture removal c/w SSI.  - f/u culture data-->GPC on GS, culture now growing MRSA  - agree with MRI L spine to gauge depth of infection-->awaiting report  - continue vancomycin (check trough before Sunday AM dose)  - if no growth of GNR by tomorrow, will d/c cefepime
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  - f/u bcx ngtd  - as per neurosurgery, plan for intra-operative drainage of spinal abscess for source control tomorrow-->please send fluid/tissue for Gram stain and culture  - continue vancomycin--increase dose to 1.5g IV q12h and repeat trough prior to Tuesday AM dose  - d/c cefepime

## 2018-06-03 NOTE — PROGRESS NOTE ADULT - SUBJECTIVE AND OBJECTIVE BOX
Surgery: lumbar wound wash out and revision  Consent: Signed by patient     penicillin (Hives)      OVERNIGHT EVENTS:  blood culture +ve for MRSA  ID recs inc vanco dose based off trough  no acute events    T(C): 37.1 (18 @ 17:17), Max: 38.6 (18 @ 18:45)  HR: 89 (18 @ 17:17) (73 - 97)  BP: 111/71 (18 @ 17:17) (97/55 - 113/64)  RR: 16 (18 @ 17:17) (16 - 17)  SpO2: 96% (18 @ 17:17) (94% - 97%)  Wt(kg): --    EXAM:  AOx3, NAD, FC, speech coherent   CN II-XII: EOMI, PERRL, face symmetric, tongue midline   Motor: MAEx4 5/5 UE and LE B/L   SILT throughout  WWP throughout   No pronator drift   Lumbar site with scant purulent drainage, gauze changed, mildly TTP, surrounding erythema, mildly edematous. No induration.        137  |  100  |  10  ----------------------------<  101<H>  4.2   |  22  |  0.80    Ca    8.7      2018 07:44  Phos  3.5       Mg     2.2           CBC Full  -  ( 2018 07:44 )  WBC Count : 9.0 K/uL  Hemoglobin : 9.1 g/dL  Hematocrit : 27.5 %  Platelet Count - Automated : 367 K/uL  Mean Cell Volume : 90.5 fL  Mean Cell Hemoglobin : 29.9 pg  Mean Cell Hemoglobin Concentration : 33.1 g/dL  Auto Neutrophil # : x  Auto Lymphocyte # : x  Auto Monocyte # : x  Auto Eosinophil # : x  Auto Basophil # : x  Auto Neutrophil % : x  Auto Lymphocyte % : x  Auto Monocyte % : x  Auto Eosinophil % : x  Auto Basophil % : x    Type & Screen (in past 72hrs): Type + Screen (18 @ 07:08)    ABO Interpretation: A    Rh Interpretation: Positive    Antibody Screen: Negative    CXR: pending  EKG: pending  ECHO: n/a  Medical Clearances: pending  Other Clearances:     Last dose of antiplatelet/anticoagulation dru/2    Implanted Devices (pacemaker, drug pump...etc):  []YES   [x] NO                  If yes --> EPS consulted to interrogate/adjust device:    MRSA swab(exclusion - cerebral angiograms and microdiscectomy) :  pending                 Assessment: MRSA wound infection s/p lumbar fusion    Plan:  plan for wash out tomorrow  appreciate ID recs for vanco, will cont and dc cefepime  NPO/IVF after MN  consent signed and witnessed  f/u pre op ekg, cxr, med clearance, Dr. Jean Baptiste to see  SCDs only, lovenox held  d/w Dr. Alvarez

## 2018-06-04 LAB
ANION GAP SERPL CALC-SCNC: 11 MMOL/L — SIGNIFICANT CHANGE UP (ref 5–17)
APTT BLD: 27.2 SEC — LOW (ref 27.5–37.4)
BLD GP AB SCN SERPL QL: NEGATIVE — SIGNIFICANT CHANGE UP
BUN SERPL-MCNC: 9 MG/DL — SIGNIFICANT CHANGE UP (ref 7–23)
CALCIUM SERPL-MCNC: 8.8 MG/DL — SIGNIFICANT CHANGE UP (ref 8.4–10.5)
CHLORIDE SERPL-SCNC: 101 MMOL/L — SIGNIFICANT CHANGE UP (ref 96–108)
CO2 SERPL-SCNC: 26 MMOL/L — SIGNIFICANT CHANGE UP (ref 22–31)
CREAT SERPL-MCNC: 0.85 MG/DL — SIGNIFICANT CHANGE UP (ref 0.5–1.3)
GLUCOSE SERPL-MCNC: 106 MG/DL — HIGH (ref 70–99)
HCT VFR BLD CALC: 26.7 % — LOW (ref 39–50)
HGB BLD-MCNC: 8.6 G/DL — LOW (ref 13–17)
INR BLD: 1.39 — HIGH (ref 0.88–1.16)
MAGNESIUM SERPL-MCNC: 2.2 MG/DL — SIGNIFICANT CHANGE UP (ref 1.6–2.6)
MCHC RBC-ENTMCNC: 30.2 PG — SIGNIFICANT CHANGE UP (ref 27–34)
MCHC RBC-ENTMCNC: 32.2 G/DL — SIGNIFICANT CHANGE UP (ref 32–36)
MCV RBC AUTO: 93.7 FL — SIGNIFICANT CHANGE UP (ref 80–100)
MRSA PCR RESULT.: NEGATIVE — SIGNIFICANT CHANGE UP
PHOSPHATE SERPL-MCNC: 3.9 MG/DL — SIGNIFICANT CHANGE UP (ref 2.5–4.5)
PLATELET # BLD AUTO: 342 K/UL — SIGNIFICANT CHANGE UP (ref 150–400)
POTASSIUM SERPL-MCNC: 4.4 MMOL/L — SIGNIFICANT CHANGE UP (ref 3.5–5.3)
POTASSIUM SERPL-SCNC: 4.4 MMOL/L — SIGNIFICANT CHANGE UP (ref 3.5–5.3)
PROTHROM AB SERPL-ACNC: 15.5 SEC — HIGH (ref 9.8–12.7)
RBC # BLD: 2.85 M/UL — LOW (ref 4.2–5.8)
RBC # FLD: 14.2 % — SIGNIFICANT CHANGE UP (ref 10.3–16.9)
RH IG SCN BLD-IMP: POSITIVE — SIGNIFICANT CHANGE UP
S AUREUS DNA NOSE QL NAA+PROBE: NEGATIVE — SIGNIFICANT CHANGE UP
SODIUM SERPL-SCNC: 138 MMOL/L — SIGNIFICANT CHANGE UP (ref 135–145)
WBC # BLD: 6.8 K/UL — SIGNIFICANT CHANGE UP (ref 3.8–10.5)
WBC # FLD AUTO: 6.8 K/UL — SIGNIFICANT CHANGE UP (ref 3.8–10.5)

## 2018-06-04 PROCEDURE — 99232 SBSQ HOSP IP/OBS MODERATE 35: CPT | Mod: GC

## 2018-06-04 PROCEDURE — 10180 I&D COMPLEX PO WOUND INFCTJ: CPT | Mod: 78

## 2018-06-04 RX ORDER — OXYCODONE AND ACETAMINOPHEN 5; 325 MG/1; MG/1
1 TABLET ORAL EVERY 4 HOURS
Qty: 0 | Refills: 0 | Status: DISCONTINUED | OUTPATIENT
Start: 2018-06-04 | End: 2018-06-07

## 2018-06-04 RX ORDER — ASPIRIN/CALCIUM CARB/MAGNESIUM 324 MG
81 TABLET ORAL DAILY
Qty: 0 | Refills: 0 | Status: DISCONTINUED | OUTPATIENT
Start: 2018-06-04 | End: 2018-06-07

## 2018-06-04 RX ORDER — HYDROMORPHONE HYDROCHLORIDE 2 MG/ML
0.5 INJECTION INTRAMUSCULAR; INTRAVENOUS; SUBCUTANEOUS
Qty: 0 | Refills: 0 | Status: DISCONTINUED | OUTPATIENT
Start: 2018-06-04 | End: 2018-06-07

## 2018-06-04 RX ORDER — ACETAMINOPHEN 500 MG
1000 TABLET ORAL ONCE
Qty: 0 | Refills: 0 | Status: COMPLETED | OUTPATIENT
Start: 2018-06-04 | End: 2018-06-05

## 2018-06-04 RX ORDER — SODIUM CHLORIDE 9 MG/ML
1000 INJECTION, SOLUTION INTRAVENOUS
Qty: 0 | Refills: 0 | Status: DISCONTINUED | OUTPATIENT
Start: 2018-06-04 | End: 2018-06-07

## 2018-06-04 RX ADMIN — OXYCODONE AND ACETAMINOPHEN 2 TABLET(S): 5; 325 TABLET ORAL at 15:26

## 2018-06-04 RX ADMIN — Medication 100 MILLIGRAM(S): at 05:47

## 2018-06-04 RX ADMIN — Medication 81 MILLIGRAM(S): at 15:11

## 2018-06-04 RX ADMIN — HYDROMORPHONE HYDROCHLORIDE 0.5 MILLIGRAM(S): 2 INJECTION INTRAMUSCULAR; INTRAVENOUS; SUBCUTANEOUS at 12:20

## 2018-06-04 RX ADMIN — AMLODIPINE BESYLATE 10 MILLIGRAM(S): 2.5 TABLET ORAL at 05:47

## 2018-06-04 RX ADMIN — Medication 300 MILLIGRAM(S): at 13:00

## 2018-06-04 RX ADMIN — Medication 300 MILLIGRAM(S): at 00:07

## 2018-06-04 RX ADMIN — OXYCODONE AND ACETAMINOPHEN 2 TABLET(S): 5; 325 TABLET ORAL at 22:57

## 2018-06-04 RX ADMIN — Medication 1 SPRAY(S): at 21:58

## 2018-06-04 RX ADMIN — OXYCODONE AND ACETAMINOPHEN 2 TABLET(S): 5; 325 TABLET ORAL at 15:11

## 2018-06-04 RX ADMIN — SODIUM CHLORIDE 75 MILLILITER(S): 9 INJECTION INTRAMUSCULAR; INTRAVENOUS; SUBCUTANEOUS at 00:07

## 2018-06-04 RX ADMIN — OXYCODONE AND ACETAMINOPHEN 2 TABLET(S): 5; 325 TABLET ORAL at 05:47

## 2018-06-04 RX ADMIN — PANTOPRAZOLE SODIUM 40 MILLIGRAM(S): 20 TABLET, DELAYED RELEASE ORAL at 05:47

## 2018-06-04 RX ADMIN — Medication 100 MILLIGRAM(S): at 21:57

## 2018-06-04 RX ADMIN — ATORVASTATIN CALCIUM 40 MILLIGRAM(S): 80 TABLET, FILM COATED ORAL at 21:57

## 2018-06-04 RX ADMIN — OXYCODONE AND ACETAMINOPHEN 2 TABLET(S): 5; 325 TABLET ORAL at 06:42

## 2018-06-04 RX ADMIN — Medication 100 MILLIGRAM(S): at 15:11

## 2018-06-04 RX ADMIN — OXYCODONE AND ACETAMINOPHEN 2 TABLET(S): 5; 325 TABLET ORAL at 21:57

## 2018-06-04 NOTE — PROGRESS NOTE ADULT - SUBJECTIVE AND OBJECTIVE BOX
Pt was examined at the bedside, incisional pain is well controlled, pt denies cp, sob, n/v, acute numbness or weakness    ICU Vital Signs Last 24 Hrs  T(C): 36.7 (04 Jun 2018 13:45), Max: 37.1 (03 Jun 2018 17:17)  T(F): 98.1 (04 Jun 2018 13:45), Max: 98.8 (03 Jun 2018 17:17)  HR: 71 (04 Jun 2018 13:45) (64 - 96)  BP: 98/54 (04 Jun 2018 13:45) (92/55 - 145/67)  BP(mean): 79 (04 Jun 2018 13:15) (73 - 86)  ABP: --  ABP(mean): --  RR: 17 (04 Jun 2018 13:45) (14 - 18)  SpO2: 100% (04 Jun 2018 13:45) (96% - 100%)    Exam:  AA&OX3, NAD, coherent speech  CNs II-XII grossly intact ,  motor 5/5 x 4 extr, no drift,  sensation to LT grossly intact throughout,  Back: dressing mildly stained with serosang drainage, + Hmvc x 2 holding suction well,     A/P  Pt is 66yo male s/p Lumbar wound washout POD#0  - Start ASA81 post-op tonight as per Dr. Alvarez  - Monitor Hmvcs x 2 oputput,  - f/u Labs in AM  - ID recs are appreciated, f/u Vanco trough prior to AM dose, echo and Blood Cx x 2 today prior to PICC  - PT/OT  - SCDs, IS, Bowel Regimen  - Advance diet as tolerated  - f/u OR Cx  - D/w Antonio Wallace

## 2018-06-04 NOTE — BRIEF OPERATIVE NOTE - PROCEDURE
<<-----Click on this checkbox to enter Procedure Washout of wound of spine  06/04/2018    Active  VZHIGIN

## 2018-06-04 NOTE — PROGRESS NOTE ADULT - SUBJECTIVE AND OBJECTIVE BOX
INTERVAL HPI/OVERNIGHT EVENTS:    CONSTITUTIONAL:  Negative fever or chills, feels well, good appetite  EYES:  Negative  blurry vision or double vision  CARDIOVASCULAR:  Negative for chest pain or palpitations  RESPIRATORY:  Negative for cough, wheezing, or SOB   GASTROINTESTINAL:  Negative for nausea, vomiting, diarrhea, constipation, or abdominal pain  GENITOURINARY:  Negative frequency, urgency or dysuria  NEUROLOGIC:  No headache, confusion, dizziness, lightheadedness      ANTIBIOTICS/RELEVANT:    MEDICATIONS  (STANDING):  amLODIPine   Tablet 10 milliGRAM(s) Oral daily  atorvastatin 40 milliGRAM(s) Oral at bedtime  docusate sodium 100 milliGRAM(s) Oral three times a day  fluticasone propionate (50 MICROgram(s)/actuation) Nasal Spray - Peds 1 Spray(s) Alternating Nostrils daily  pantoprazole    Tablet 40 milliGRAM(s) Oral before breakfast  sodium chloride 0.9%. 1000 milliLiter(s) (75 mL/Hr) IV Continuous <Continuous>  vancomycin  IVPB 1500 milliGRAM(s) IV Intermittent every 12 hours    MEDICATIONS  (PRN):  acetaminophen   Tablet 650 milliGRAM(s) Oral every 6 hours PRN For Temp greater than 38 C (100.4 F)  acetaminophen   Tablet. 650 milliGRAM(s) Oral every 6 hours PRN Mild Pain (1 - 3)  ALBUTerol    90 MICROgram(s) HFA Inhaler 2 Puff(s) Inhalation every 6 hours PRN Wheezing  ALPRAZolam 1 milliGRAM(s) Oral three times a day PRN anxiety  methocarbamol 750 milliGRAM(s) Oral every 8 hours PRN Spasms/Stiffness  ondansetron Injectable 4 milliGRAM(s) IV Push every 6 hours PRN Nausea and/or Vomiting  oxyCODONE    5 mG/acetaminophen 325 mG 2 Tablet(s) Oral every 4 hours PRN Severe Pain (7 - 10)  senna 2 Tablet(s) Oral at bedtime PRN Constipation  zolpidem 5 milliGRAM(s) Oral at bedtime PRN Insomnia        Vital Signs Last 24 Hrs  T(C): 36.7 (04 Jun 2018 04:20), Max: 37.1 (03 Jun 2018 17:17)  T(F): 98.1 (04 Jun 2018 04:20), Max: 98.8 (03 Jun 2018 17:17)  HR: 64 (04 Jun 2018 04:20) (64 - 89)  BP: 111/65 (04 Jun 2018 04:20) (92/55 - 117/73)  BP(mean): --  RR: 17 (04 Jun 2018 04:20) (16 - 17)  SpO2: 96% (04 Jun 2018 04:20) (96% - 98%)    06-03-18 @ 07:01  -  06-04-18 @ 07:00  --------------------------------------------------------  IN: 2700 mL / OUT: 500 mL / NET: 2200 mL      PHYSICAL EXAM:  Constitutional:Well-developed, well nourished  Eyes:ATILIO, EOMI  Ear/Nose/Throat: no oral lesion, no sinus tenderness on percussion	  Neck:no JVD, no lymphadenopathy, supple  Respiratory: CTA kathleen  Cardiovascular: S1S2 RRR, no murmurs  Gastrointestinal:soft, (+) BS, no HSM  Extremities:no e/e/c  Vascular: DP Pulse:	right normal; left normal      LABS:                        8.6    6.8   )-----------( 342      ( 04 Jun 2018 05:28 )             26.7     06-04    138  |  101  |  9   ----------------------------<  106<H>  4.4   |  26  |  0.85    Ca    8.8      04 Jun 2018 05:28  Phos  3.9     06-04  Mg     2.2     06-04      PT/INR - ( 04 Jun 2018 05:28 )   PT: 15.5 sec;   INR: 1.39          PTT - ( 04 Jun 2018 05:28 )  PTT:27.2 sec      MICROBIOLOGY:    RADIOLOGY & ADDITIONAL STUDIES: INTERVAL HPI/OVERNIGHT EVENTS:    Patient seen and examined at bedside. Reports improvement in back pain from a 9/10 to a 6/10. Wound still oozing a serous discharge as per nurse. recently changed by nurse in am. scheduled for OR today   CONSTITUTIONAL:  Negative fever or chills, feels well, good appetite  EYES:  Negative  blurry vision or double vision  CARDIOVASCULAR:  Negative for chest pain or palpitations  RESPIRATORY:  Negative for cough, wheezing, or SOB   GASTROINTESTINAL:  Negative for nausea, vomiting, diarrhea, constipation, or abdominal pain  GENITOURINARY:  Negative frequency, urgency or dysuria  NEUROLOGIC:  + back pain       ANTIBIOTICS/RELEVANT:    MEDICATIONS  (STANDING):  amLODIPine   Tablet 10 milliGRAM(s) Oral daily  atorvastatin 40 milliGRAM(s) Oral at bedtime  docusate sodium 100 milliGRAM(s) Oral three times a day  fluticasone propionate (50 MICROgram(s)/actuation) Nasal Spray - Peds 1 Spray(s) Alternating Nostrils daily  pantoprazole    Tablet 40 milliGRAM(s) Oral before breakfast  sodium chloride 0.9%. 1000 milliLiter(s) (75 mL/Hr) IV Continuous <Continuous>  vancomycin  IVPB 1500 milliGRAM(s) IV Intermittent every 12 hours    MEDICATIONS  (PRN):  acetaminophen   Tablet 650 milliGRAM(s) Oral every 6 hours PRN For Temp greater than 38 C (100.4 F)  acetaminophen   Tablet. 650 milliGRAM(s) Oral every 6 hours PRN Mild Pain (1 - 3)  ALBUTerol    90 MICROgram(s) HFA Inhaler 2 Puff(s) Inhalation every 6 hours PRN Wheezing  ALPRAZolam 1 milliGRAM(s) Oral three times a day PRN anxiety  methocarbamol 750 milliGRAM(s) Oral every 8 hours PRN Spasms/Stiffness  ondansetron Injectable 4 milliGRAM(s) IV Push every 6 hours PRN Nausea and/or Vomiting  oxyCODONE    5 mG/acetaminophen 325 mG 2 Tablet(s) Oral every 4 hours PRN Severe Pain (7 - 10)  senna 2 Tablet(s) Oral at bedtime PRN Constipation  zolpidem 5 milliGRAM(s) Oral at bedtime PRN Insomnia        Vital Signs Last 24 Hrs  T(C): 36.7 (04 Jun 2018 04:20), Max: 37.1 (03 Jun 2018 17:17)  T(F): 98.1 (04 Jun 2018 04:20), Max: 98.8 (03 Jun 2018 17:17)  HR: 64 (04 Jun 2018 04:20) (64 - 89)  BP: 111/65 (04 Jun 2018 04:20) (92/55 - 117/73)  BP(mean): --  RR: 17 (04 Jun 2018 04:20) (16 - 17)  SpO2: 96% (04 Jun 2018 04:20) (96% - 98%)    06-03-18 @ 07:01  -  06-04-18 @ 07:00  --------------------------------------------------------  IN: 2700 mL / OUT: 500 mL / NET: 2200 mL      PHYSICAL EXAM:  Constitutional: Well-developed, well nourished, many tattoos noted on skin   Eyes:ATILIO, EOMI  Ear/Nose/Throat: no oral lesion, no sinus tenderness on percussion	  Neck:no JVD, no lymphadenopathy, supple  Respiratory: CTA kathleen  Cardiovascular: S1S2 RRR, no murmurs  Gastrointestinal:soft, (+) BS, no HSM  Extremities:no e/e/c  Skin: lumbar wound dressing in place mildly soaked with yellowish discharge.   Vascular: radial pulses 2+ B/L      LABS:                        8.6    6.8   )-----------( 342      ( 04 Jun 2018 05:28 )             26.7     06-04    138  |  101  |  9   ----------------------------<  106<H>  4.4   |  26  |  0.85    Ca    8.8      04 Jun 2018 05:28  Phos  3.9     06-04  Mg     2.2     06-04      PT/INR - ( 04 Jun 2018 05:28 )   PT: 15.5 sec;   INR: 1.39          PTT - ( 04 Jun 2018 05:28 )  PTT:27.2 sec      MICROBIOLOGY:    Culture - Surgical Swab (06.01.18 @ 16:52)    Gram Stain:   Few Gram Positive Cocci in Clusters  Moderate White blood cells    -  Cefazolin: R 16    -  Clindamycin: S <=0.5    -  Daptomycin: S 1    -  Erythromycin: R >4    -  Linezolid: S 4    -  Oxacillin: R >2    -  Penicillin: R >8    -  RIF- Rifampin: S <=1    -  Tetra/Doxy: S <=4    -  Trimethoprim/Sulfamethoxazole: S <=0.5/9.5    -  Vancomycin: S 0.75    Specimen Source: .Surgical Swab lumbar incision    Culture Results:   Numerous Methicillin resistant Staphylococcus aureus  Result called to and read back byUrbano Zhou RN  06/02/2018 14:24:48    Organism Identification: Methicillin resistant Staphylococcus aureus  Methicillin resistant Staphylococcus aureus    Organism: Methicillin resistant Staphylococcus aureus    Organism: Methicillin resistant Staphylococcus aureus    Method Type: ETEST    Method Type: ANAI  -----------------------------------------------------------------------------------------  Culture - Blood (06.01.18 @ 16:35)    Gram Stain:   Anaerobic Bottle: Gram Positive Cocci in Clusters  Result called to and read back byUrbano Spangler RN (9 WO) 06/03/2018 15:06  ***Blood Panel PCR results on this specimen are available  approximately 3 hours after the Gram stain result.***  Gram stain, PCR, and/or culture results may not always  correspond due to difference in methodologies.  ************************************************************  This PCR assay was performed using [employer].  The following targets are tested for: Enterococcus,  vancomycin resistant enterococci, Listeria monocytogenes,  coagulase negative staphylococci, S. aureus,  methicillin resistant S. aureus, Streptococcus agalactiae  (Group B), S. pneumoniae, S. pyogenes (Group A),  Acinetobacter baumannii,Enterobacter cloacae, E. coli,  Klebsiella oxytoca, K. pneumoniae, Proteus sp.,  Serratia marcescens, Haemophilus influenzae,  Neisseria meningitidis, Pseudomonas aeruginosa, Candida  albicans, C. glabrata, C krusei, C parapsilosis,  C. tropicalis and the KPC resistance gene.  "Due to technical problems, Proteus sp. will Not be reported as part of  the BCID panel until further notice"    -  Methicillin resistant Staphylococcus aureus (MRSA): Detec    Specimen Source: .Blood Blood    Organism: Blood Culture PCR    Culture Results:   Culture in progress    Organism Identification: Blood Culture PCR    Method Type: PCR        RADIOLOGY & ADDITIONAL STUDIES:    MR Lumbar Spine No Cont (06.02.18 @ 12:11) >  Status post posterior fusion of L4, L5 and S1. Posterior cerebral. The   possibility of a superimposed infection cannot be excluded.    Interval appearance of a small posterior subdural fluid collection   spanning from L1/L2-L2/L3 contributing to moderate to severe spinal canal   stenosis.    < end of copied text >      A/P: Patient is a 66 yo male with spinal stenosis who underwent L4-S1 fusion and L4-5 laminectomy on 5/16, presenting with two days of purulent discharge from surgical site, cx growing MRSA and baceterimic with the same organisms  found to have L1-3 subdural abscess and severe spinal canal stenosis.  - f/u bcx ngtd  - as per neurosurgery, plan for intra-operative drainage of spinal abscess for source control tomorrow-->please send fluid/tissue for Gram stain and culture  - continue vancomycin--increase dose to 1.5g IV q12h and repeat trough prior to Tuesday AM dose  - d/c cefepime      *PENDING DISCUSSION WITH ID ATTENDING INTERVAL HPI/OVERNIGHT EVENTS:    Patient seen and examined at bedside. Reports improvement in back pain from a 9/10 to a 6/10. Wound still oozing a serous discharge as per nurse. recently changed by nurse in am. scheduled for OR today   CONSTITUTIONAL:  Negative fever or chills, feels well, good appetite  EYES:  Negative  blurry vision or double vision  CARDIOVASCULAR:  Negative for chest pain or palpitations  RESPIRATORY:  Negative for cough, wheezing, or SOB   GASTROINTESTINAL:  Negative for nausea, vomiting, diarrhea, constipation, or abdominal pain  GENITOURINARY:  Negative frequency, urgency or dysuria  NEUROLOGIC:  + back pain       ANTIBIOTICS/RELEVANT:    MEDICATIONS  (STANDING):  amLODIPine   Tablet 10 milliGRAM(s) Oral daily  atorvastatin 40 milliGRAM(s) Oral at bedtime  docusate sodium 100 milliGRAM(s) Oral three times a day  fluticasone propionate (50 MICROgram(s)/actuation) Nasal Spray - Peds 1 Spray(s) Alternating Nostrils daily  pantoprazole    Tablet 40 milliGRAM(s) Oral before breakfast  sodium chloride 0.9%. 1000 milliLiter(s) (75 mL/Hr) IV Continuous <Continuous>  vancomycin  IVPB 1500 milliGRAM(s) IV Intermittent every 12 hours    MEDICATIONS  (PRN):  acetaminophen   Tablet 650 milliGRAM(s) Oral every 6 hours PRN For Temp greater than 38 C (100.4 F)  acetaminophen   Tablet. 650 milliGRAM(s) Oral every 6 hours PRN Mild Pain (1 - 3)  ALBUTerol    90 MICROgram(s) HFA Inhaler 2 Puff(s) Inhalation every 6 hours PRN Wheezing  ALPRAZolam 1 milliGRAM(s) Oral three times a day PRN anxiety  methocarbamol 750 milliGRAM(s) Oral every 8 hours PRN Spasms/Stiffness  ondansetron Injectable 4 milliGRAM(s) IV Push every 6 hours PRN Nausea and/or Vomiting  oxyCODONE    5 mG/acetaminophen 325 mG 2 Tablet(s) Oral every 4 hours PRN Severe Pain (7 - 10)  senna 2 Tablet(s) Oral at bedtime PRN Constipation  zolpidem 5 milliGRAM(s) Oral at bedtime PRN Insomnia        Vital Signs Last 24 Hrs  T(C): 36.7 (04 Jun 2018 04:20), Max: 37.1 (03 Jun 2018 17:17)  T(F): 98.1 (04 Jun 2018 04:20), Max: 98.8 (03 Jun 2018 17:17)  HR: 64 (04 Jun 2018 04:20) (64 - 89)  BP: 111/65 (04 Jun 2018 04:20) (92/55 - 117/73)  BP(mean): --  RR: 17 (04 Jun 2018 04:20) (16 - 17)  SpO2: 96% (04 Jun 2018 04:20) (96% - 98%)    06-03-18 @ 07:01  -  06-04-18 @ 07:00  --------------------------------------------------------  IN: 2700 mL / OUT: 500 mL / NET: 2200 mL      PHYSICAL EXAM:  Constitutional: Well-developed, well nourished, many tattoos noted on skin   Eyes:ATILIO, EOMI  Ear/Nose/Throat: no oral lesion, no sinus tenderness on percussion	  Neck:no JVD, no lymphadenopathy, supple  Respiratory: CTA kathleen  Cardiovascular: S1S2 RRR, no murmurs  Gastrointestinal:soft, (+) BS, no HSM  Extremities:no e/e/c  Skin: lumbar wound dressing in place mildly soaked with yellowish discharge.   Vascular: radial pulses 2+ B/L      LABS:                        8.6    6.8   )-----------( 342      ( 04 Jun 2018 05:28 )             26.7     06-04    138  |  101  |  9   ----------------------------<  106<H>  4.4   |  26  |  0.85    Ca    8.8      04 Jun 2018 05:28  Phos  3.9     06-04  Mg     2.2     06-04      PT/INR - ( 04 Jun 2018 05:28 )   PT: 15.5 sec;   INR: 1.39          PTT - ( 04 Jun 2018 05:28 )  PTT:27.2 sec      MICROBIOLOGY:    Culture - Surgical Swab (06.01.18 @ 16:52)    Gram Stain:   Few Gram Positive Cocci in Clusters  Moderate White blood cells    -  Cefazolin: R 16    -  Clindamycin: S <=0.5    -  Daptomycin: S 1    -  Erythromycin: R >4    -  Linezolid: S 4    -  Oxacillin: R >2    -  Penicillin: R >8    -  RIF- Rifampin: S <=1    -  Tetra/Doxy: S <=4    -  Trimethoprim/Sulfamethoxazole: S <=0.5/9.5    -  Vancomycin: S 0.75    Specimen Source: .Surgical Swab lumbar incision    Culture Results:   Numerous Methicillin resistant Staphylococcus aureus  Result called to and read back byUrbano Zhou RN  06/02/2018 14:24:48    Organism Identification: Methicillin resistant Staphylococcus aureus  Methicillin resistant Staphylococcus aureus    Organism: Methicillin resistant Staphylococcus aureus    Organism: Methicillin resistant Staphylococcus aureus    Method Type: ETEST    Method Type: ANAI  -----------------------------------------------------------------------------------------  Culture - Blood (06.01.18 @ 16:35)    Gram Stain:   Anaerobic Bottle: Gram Positive Cocci in Clusters  Result called to and read back byUrbano Spangler RN (9 WO) 06/03/2018 15:06  ***Blood Panel PCR results on this specimen are available  approximately 3 hours after the Gram stain result.***  Gram stain, PCR, and/or culture results may not always  correspond due to difference in methodologies.  ************************************************************  This PCR assay was performed using [employer] ([employer]  The following targets are tested for: Enterococcus,  vancomycin resistant enterococci, Listeria monocytogenes,  coagulase negative staphylococci, S. aureus,  methicillin resistant S. aureus, Streptococcus agalactiae  (Group B), S. pneumoniae, S. pyogenes (Group A),  Acinetobacter baumannii,Enterobacter cloacae, E. coli,  Klebsiella oxytoca, K. pneumoniae, Proteus sp.,  Serratia marcescens, Haemophilus influenzae,  Neisseria meningitidis, Pseudomonas aeruginosa, Candida  albicans, C. glabrata, C krusei, C parapsilosis,  C. tropicalis and the KPC resistance gene.  "Due to technical problems, Proteus sp. will Not be reported as part of  the BCID panel until further notice"    -  Methicillin resistant Staphylococcus aureus (MRSA): Detec    Specimen Source: .Blood Blood    Organism: Blood Culture PCR    Culture Results:   Culture in progress    Organism Identification: Blood Culture PCR    Method Type: PCR        RADIOLOGY & ADDITIONAL STUDIES:    MR Lumbar Spine No Cont (06.02.18 @ 12:11) >  Status post posterior fusion of L4, L5 and S1. Posterior cerebral. The   possibility of a superimposed infection cannot be excluded.    Interval appearance of a small posterior subdural fluid collection   spanning from L1/L2-L2/L3 contributing to moderate to severe spinal canal   stenosis.    < end of copied text >      A/P: Patient is a 68 yo male with spinal stenosis who underwent L4-S1 fusion and L4-5 laminectomy on 5/16, presenting with two days of purulent discharge from surgical site, cx growing MRSA and baceterimic with the same organisms. found to have L1-3 subdural fluid collection and severe spinal canal stenosis.  -- needs surveillance repeat Bcx to monitor clearance  -- plan for intra-operative drainage of spinal abscess for source control tomorrow-->please send fluid/tissue for Gram stain and culture  - continue vancomycin at 1.5g IV q12h and repeat trough prior to Tuesday AM dose      *PENDING DISCUSSION WITH ID ATTENDING* INTERVAL HPI/OVERNIGHT EVENTS:    Patient seen and examined at bedside. Reports improvement in back pain from a 9/10 to a 6/10. Wound still oozing a serous discharge as per nurse. recently changed by nurse in am. scheduled for OR today   CONSTITUTIONAL:  Negative fever or chills, feels well, good appetite  EYES:  Negative  blurry vision or double vision  CARDIOVASCULAR:  Negative for chest pain or palpitations  RESPIRATORY:  Negative for cough, wheezing, or SOB   GASTROINTESTINAL:  Negative for nausea, vomiting, diarrhea, constipation, or abdominal pain  GENITOURINARY:  Negative frequency, urgency or dysuria  NEUROLOGIC:  + back pain       ANTIBIOTICS/RELEVANT:    MEDICATIONS  (STANDING):  amLODIPine   Tablet 10 milliGRAM(s) Oral daily  atorvastatin 40 milliGRAM(s) Oral at bedtime  docusate sodium 100 milliGRAM(s) Oral three times a day  fluticasone propionate (50 MICROgram(s)/actuation) Nasal Spray - Peds 1 Spray(s) Alternating Nostrils daily  pantoprazole    Tablet 40 milliGRAM(s) Oral before breakfast  sodium chloride 0.9%. 1000 milliLiter(s) (75 mL/Hr) IV Continuous <Continuous>  vancomycin  IVPB 1500 milliGRAM(s) IV Intermittent every 12 hours    MEDICATIONS  (PRN):  acetaminophen   Tablet 650 milliGRAM(s) Oral every 6 hours PRN For Temp greater than 38 C (100.4 F)  acetaminophen   Tablet. 650 milliGRAM(s) Oral every 6 hours PRN Mild Pain (1 - 3)  ALBUTerol    90 MICROgram(s) HFA Inhaler 2 Puff(s) Inhalation every 6 hours PRN Wheezing  ALPRAZolam 1 milliGRAM(s) Oral three times a day PRN anxiety  methocarbamol 750 milliGRAM(s) Oral every 8 hours PRN Spasms/Stiffness  ondansetron Injectable 4 milliGRAM(s) IV Push every 6 hours PRN Nausea and/or Vomiting  oxyCODONE    5 mG/acetaminophen 325 mG 2 Tablet(s) Oral every 4 hours PRN Severe Pain (7 - 10)  senna 2 Tablet(s) Oral at bedtime PRN Constipation  zolpidem 5 milliGRAM(s) Oral at bedtime PRN Insomnia        Vital Signs Last 24 Hrs  T(C): 36.7 (04 Jun 2018 04:20), Max: 37.1 (03 Jun 2018 17:17)  T(F): 98.1 (04 Jun 2018 04:20), Max: 98.8 (03 Jun 2018 17:17)  HR: 64 (04 Jun 2018 04:20) (64 - 89)  BP: 111/65 (04 Jun 2018 04:20) (92/55 - 117/73)  BP(mean): --  RR: 17 (04 Jun 2018 04:20) (16 - 17)  SpO2: 96% (04 Jun 2018 04:20) (96% - 98%)    06-03-18 @ 07:01  -  06-04-18 @ 07:00  --------------------------------------------------------  IN: 2700 mL / OUT: 500 mL / NET: 2200 mL      PHYSICAL EXAM:  Constitutional: Well-developed, well nourished, many tattoos noted on skin   Eyes:ATILIO, EOMI  Ear/Nose/Throat: no oral lesion, no sinus tenderness on percussion	  Neck:no JVD, no lymphadenopathy, supple  Respiratory: CTA kathleen  Cardiovascular: S1S2 RRR, no murmurs  Gastrointestinal:soft, (+) BS, no HSM  Extremities:no e/e/c  Skin: lumbar wound dressing in place mildly soaked with yellowish discharge.   Vascular: radial pulses 2+ B/L      LABS:                        8.6    6.8   )-----------( 342      ( 04 Jun 2018 05:28 )             26.7     06-04    138  |  101  |  9   ----------------------------<  106<H>  4.4   |  26  |  0.85    Ca    8.8      04 Jun 2018 05:28  Phos  3.9     06-04  Mg     2.2     06-04      PT/INR - ( 04 Jun 2018 05:28 )   PT: 15.5 sec;   INR: 1.39          PTT - ( 04 Jun 2018 05:28 )  PTT:27.2 sec      MICROBIOLOGY:    Culture - Surgical Swab (06.01.18 @ 16:52)    Gram Stain:   Few Gram Positive Cocci in Clusters  Moderate White blood cells    -  Cefazolin: R 16    -  Clindamycin: S <=0.5    -  Daptomycin: S 1    -  Erythromycin: R >4    -  Linezolid: S 4    -  Oxacillin: R >2    -  Penicillin: R >8    -  RIF- Rifampin: S <=1    -  Tetra/Doxy: S <=4    -  Trimethoprim/Sulfamethoxazole: S <=0.5/9.5    -  Vancomycin: S 0.75    Specimen Source: .Surgical Swab lumbar incision    Culture Results:   Numerous Methicillin resistant Staphylococcus aureus  Result called to and read back byUrbano Zhou RN  06/02/2018 14:24:48    Organism Identification: Methicillin resistant Staphylococcus aureus  Methicillin resistant Staphylococcus aureus    Organism: Methicillin resistant Staphylococcus aureus    Organism: Methicillin resistant Staphylococcus aureus    Method Type: ETEST    Method Type: ANAI  -----------------------------------------------------------------------------------------  Culture - Blood (06.01.18 @ 16:35)    Gram Stain:   Anaerobic Bottle: Gram Positive Cocci in Clusters  Result called to and read back byUrbano Spangler RN (9 WO) 06/03/2018 15:06  ***Blood Panel PCR results on this specimen are available  approximately 3 hours after the Gram stain result.***  Gram stain, PCR, and/or culture results may not always  correspond due to difference in methodologies.  ************************************************************  This PCR assay was performed using [employer].  The following targets are tested for: Enterococcus,  vancomycin resistant enterococci, Listeria monocytogenes,  coagulase negative staphylococci, S. aureus,  methicillin resistant S. aureus, Streptococcus agalactiae  (Group B), S. pneumoniae, S. pyogenes (Group A),  Acinetobacter baumannii,Enterobacter cloacae, E. coli,  Klebsiella oxytoca, K. pneumoniae, Proteus sp.,  Serratia marcescens, Haemophilus influenzae,  Neisseria meningitidis, Pseudomonas aeruginosa, Candida  albicans, C. glabrata, C krusei, C parapsilosis,  C. tropicalis and the KPC resistance gene.  "Due to technical problems, Proteus sp. will Not be reported as part of  the BCID panel until further notice"    -  Methicillin resistant Staphylococcus aureus (MRSA): Detec    Specimen Source: .Blood Blood    Organism: Blood Culture PCR    Culture Results:   Culture in progress    Organism Identification: Blood Culture PCR    Method Type: PCR        RADIOLOGY & ADDITIONAL STUDIES:    MR Lumbar Spine No Cont (06.02.18 @ 12:11) >  Status post posterior fusion of L4, L5 and S1. Posterior cerebral. The   possibility of a superimposed infection cannot be excluded.    Interval appearance of a small posterior subdural fluid collection   spanning from L1/L2-L2/L3 contributing to moderate to severe spinal canal   stenosis.    < end of copied text >      A/P: Patient is a 68 yo male with spinal stenosis who underwent L4-S1 fusion and L4-5 laminectomy on 5/16, presenting with two days of purulent discharge from surgical site, cx growing MRSA and baceterimic with the same organisms. found to have L1-3 subdural fluid collection and severe spinal canal stenosis.  -- needs surveillance repeat Bcx to monitor clearance ONLY then can you place a PICC line  -- recommend to start TTE to look for source or vegtation   -- plan for intra-operative drainage of spinal abscess for source control today-->please send fluid/tissue for Gram stain and culture  - continue vancomycin at 1.5g IV q12h and repeat trough prior to Tuesday AM dose    Discussed with ID attending; will continue to follow

## 2018-06-05 LAB
-  CEFAZOLIN: SIGNIFICANT CHANGE UP
-  CEFAZOLIN: SIGNIFICANT CHANGE UP
-  CLINDAMYCIN: SIGNIFICANT CHANGE UP
-  CLINDAMYCIN: SIGNIFICANT CHANGE UP
-  DAPTOMYCIN: SIGNIFICANT CHANGE UP
-  DAPTOMYCIN: SIGNIFICANT CHANGE UP
-  ERYTHROMYCIN: SIGNIFICANT CHANGE UP
-  ERYTHROMYCIN: SIGNIFICANT CHANGE UP
-  LINEZOLID: SIGNIFICANT CHANGE UP
-  LINEZOLID: SIGNIFICANT CHANGE UP
-  OXACILLIN: SIGNIFICANT CHANGE UP
-  OXACILLIN: SIGNIFICANT CHANGE UP
-  PENICILLIN: SIGNIFICANT CHANGE UP
-  PENICILLIN: SIGNIFICANT CHANGE UP
-  RIFAMPIN: SIGNIFICANT CHANGE UP
-  RIFAMPIN: SIGNIFICANT CHANGE UP
-  TETRACYCLINE: SIGNIFICANT CHANGE UP
-  TETRACYCLINE: SIGNIFICANT CHANGE UP
-  TRIMETHOPRIM/SULFAMETHOXAZOLE: SIGNIFICANT CHANGE UP
-  TRIMETHOPRIM/SULFAMETHOXAZOLE: SIGNIFICANT CHANGE UP
-  VANCOMYCIN: SIGNIFICANT CHANGE UP
-  VANCOMYCIN: SIGNIFICANT CHANGE UP
ANION GAP SERPL CALC-SCNC: 12 MMOL/L — SIGNIFICANT CHANGE UP (ref 5–17)
BUN SERPL-MCNC: 8 MG/DL — SIGNIFICANT CHANGE UP (ref 7–23)
CALCIUM SERPL-MCNC: 8.4 MG/DL — SIGNIFICANT CHANGE UP (ref 8.4–10.5)
CHLORIDE SERPL-SCNC: 100 MMOL/L — SIGNIFICANT CHANGE UP (ref 96–108)
CO2 SERPL-SCNC: 22 MMOL/L — SIGNIFICANT CHANGE UP (ref 22–31)
CREAT SERPL-MCNC: 0.73 MG/DL — SIGNIFICANT CHANGE UP (ref 0.5–1.3)
GLUCOSE SERPL-MCNC: 97 MG/DL — SIGNIFICANT CHANGE UP (ref 70–99)
GRAM STN FLD: SIGNIFICANT CHANGE UP
GRAM STN FLD: SIGNIFICANT CHANGE UP
HCT VFR BLD CALC: 25 % — LOW (ref 39–50)
HGB BLD-MCNC: 8 G/DL — LOW (ref 13–17)
MAGNESIUM SERPL-MCNC: 2 MG/DL — SIGNIFICANT CHANGE UP (ref 1.6–2.6)
MCHC RBC-ENTMCNC: 29.4 PG — SIGNIFICANT CHANGE UP (ref 27–34)
MCHC RBC-ENTMCNC: 32 G/DL — SIGNIFICANT CHANGE UP (ref 32–36)
MCV RBC AUTO: 91.9 FL — SIGNIFICANT CHANGE UP (ref 80–100)
METHOD TYPE: SIGNIFICANT CHANGE UP
METHOD TYPE: SIGNIFICANT CHANGE UP
PHOSPHATE SERPL-MCNC: 3.7 MG/DL — SIGNIFICANT CHANGE UP (ref 2.5–4.5)
PLATELET # BLD AUTO: 330 K/UL — SIGNIFICANT CHANGE UP (ref 150–400)
POTASSIUM SERPL-MCNC: 5.3 MMOL/L — SIGNIFICANT CHANGE UP (ref 3.5–5.3)
POTASSIUM SERPL-SCNC: 5.3 MMOL/L — SIGNIFICANT CHANGE UP (ref 3.5–5.3)
RBC # BLD: 2.72 M/UL — LOW (ref 4.2–5.8)
RBC # FLD: 14.8 % — SIGNIFICANT CHANGE UP (ref 10.3–16.9)
SODIUM SERPL-SCNC: 134 MMOL/L — LOW (ref 135–145)
VANCOMYCIN TROUGH SERPL-MCNC: 20 UG/ML — SIGNIFICANT CHANGE UP (ref 10–20)
WBC # BLD: 6.9 K/UL — SIGNIFICANT CHANGE UP (ref 3.8–10.5)
WBC # FLD AUTO: 6.9 K/UL — SIGNIFICANT CHANGE UP (ref 3.8–10.5)

## 2018-06-05 PROCEDURE — 93306 TTE W/DOPPLER COMPLETE: CPT | Mod: 26

## 2018-06-05 PROCEDURE — 99232 SBSQ HOSP IP/OBS MODERATE 35: CPT | Mod: GC

## 2018-06-05 RX ORDER — ROSUVASTATIN CALCIUM 5 MG/1
20 TABLET ORAL AT BEDTIME
Qty: 0 | Refills: 0 | Status: DISCONTINUED | OUTPATIENT
Start: 2018-06-05 | End: 2018-06-07

## 2018-06-05 RX ORDER — SODIUM CHLORIDE 9 MG/ML
2 INJECTION INTRAMUSCULAR; INTRAVENOUS; SUBCUTANEOUS EVERY 8 HOURS
Qty: 0 | Refills: 0 | Status: DISCONTINUED | OUTPATIENT
Start: 2018-06-05 | End: 2018-06-07

## 2018-06-05 RX ORDER — ATORVASTATIN CALCIUM 80 MG/1
20 TABLET, FILM COATED ORAL AT BEDTIME
Qty: 0 | Refills: 0 | Status: DISCONTINUED | OUTPATIENT
Start: 2018-06-05 | End: 2018-06-05

## 2018-06-05 RX ADMIN — SODIUM CHLORIDE 2 GRAM(S): 9 INJECTION INTRAMUSCULAR; INTRAVENOUS; SUBCUTANEOUS at 21:54

## 2018-06-05 RX ADMIN — Medication 400 MILLIGRAM(S): at 12:15

## 2018-06-05 RX ADMIN — ROSUVASTATIN CALCIUM 20 MILLIGRAM(S): 5 TABLET ORAL at 22:34

## 2018-06-05 RX ADMIN — Medication 100 MILLIGRAM(S): at 13:57

## 2018-06-05 RX ADMIN — Medication 1 SPRAY(S): at 12:15

## 2018-06-05 RX ADMIN — Medication 100 MILLIGRAM(S): at 05:41

## 2018-06-05 RX ADMIN — Medication 100 MILLIGRAM(S): at 21:54

## 2018-06-05 RX ADMIN — HYDROMORPHONE HYDROCHLORIDE 0.5 MILLIGRAM(S): 2 INJECTION INTRAMUSCULAR; INTRAVENOUS; SUBCUTANEOUS at 12:50

## 2018-06-05 RX ADMIN — OXYCODONE AND ACETAMINOPHEN 2 TABLET(S): 5; 325 TABLET ORAL at 06:28

## 2018-06-05 RX ADMIN — OXYCODONE AND ACETAMINOPHEN 2 TABLET(S): 5; 325 TABLET ORAL at 13:57

## 2018-06-05 RX ADMIN — Medication 1000 MILLIGRAM(S): at 12:45

## 2018-06-05 RX ADMIN — OXYCODONE AND ACETAMINOPHEN 2 TABLET(S): 5; 325 TABLET ORAL at 05:40

## 2018-06-05 RX ADMIN — OXYCODONE AND ACETAMINOPHEN 2 TABLET(S): 5; 325 TABLET ORAL at 14:27

## 2018-06-05 RX ADMIN — Medication 300 MILLIGRAM(S): at 15:15

## 2018-06-05 RX ADMIN — PANTOPRAZOLE SODIUM 40 MILLIGRAM(S): 20 TABLET, DELAYED RELEASE ORAL at 05:41

## 2018-06-05 RX ADMIN — OXYCODONE AND ACETAMINOPHEN 2 TABLET(S): 5; 325 TABLET ORAL at 21:55

## 2018-06-05 RX ADMIN — AMLODIPINE BESYLATE 10 MILLIGRAM(S): 2.5 TABLET ORAL at 05:41

## 2018-06-05 RX ADMIN — Medication 300 MILLIGRAM(S): at 00:30

## 2018-06-05 RX ADMIN — Medication 1 MILLIGRAM(S): at 12:02

## 2018-06-05 RX ADMIN — Medication 81 MILLIGRAM(S): at 12:02

## 2018-06-05 RX ADMIN — OXYCODONE AND ACETAMINOPHEN 2 TABLET(S): 5; 325 TABLET ORAL at 20:55

## 2018-06-05 NOTE — PHYSICAL THERAPY INITIAL EVALUATION ADULT - ADDITIONAL COMMENTS
Pt lives in apartment with 4 stairs to enter with wide, bilateral rails. Pt lives with girlfriend who is at work during the day. Since spinal sx 5/16/18 pt has needed assist to get out of bed 2/2 "pulling"-like pain in back, assist with lower body dressing. States recent amb tolerance ~500ft with difficulty 2/2 pain and fatigue. Denies falls, denies device use.

## 2018-06-05 NOTE — PHYSICAL THERAPY INITIAL EVALUATION ADULT - DISCHARGE DISPOSITION, PT EVAL
Pt will benefit from Outpatient PT following course of abx and follow up with surgeon/no skilled PT needs/home

## 2018-06-05 NOTE — PROGRESS NOTE ADULT - SUBJECTIVE AND OBJECTIVE BOX
HPI:  HPI: Pt is s/p L4-L5 laminectomy with medial facetectomy and L4-S1 fusion on 5/16 with Dr. Alvarez for lumbar stenosis. He was seen in the office on Tuesday 5/29 for follow up appointment and staples were removed - wound appeared to be healing well at the time. Pt now  presents for low back pain and purulent drainage from wound since yesterday. Pt denies fever, chills, n/v/d, HA or blurry vision. He has no other signs of infection. Reports his LE weakness has improved since pre-op. (01 Jun 2018 18:02)    OVERNIGHT EVENTS:  Vital Signs Last 24 Hrs  T(C): 36.5 (05 Jun 2018 05:52), Max: 36.7 (04 Jun 2018 13:45)  T(F): 97.7 (05 Jun 2018 05:52), Max: 98.1 (04 Jun 2018 13:45)  HR: 69 (05 Jun 2018 05:52) (66 - 96)  BP: 119/57 (05 Jun 2018 05:52) (98/54 - 151/72)  BP(mean): 79 (04 Jun 2018 13:15) (73 - 86)  RR: 16 (05 Jun 2018 05:52) (14 - 18)  SpO2: 99% (05 Jun 2018 05:52) (97% - 100%)    I&O's Summary    04 Jun 2018 07:01  -  05 Jun 2018 07:00  --------------------------------------------------------  IN: 2190 mL / OUT: 1700 mL / NET: 490 mL        PHYSICAL EXAM:  Neurological:    A&OX3 Cranial nerves intact  BRIAN  Lami incision Cdi with hemovacs X2  One dislodge and removed    Cardiovascular: RRR  Respiratory: Lungs CTAB  Gastrointestinal: +BS  Genitourinary: voiding without dificulty  Extremities: warm and dry  Incision/Wound: CDI      DIET: Regular      LABS:                        8.0    6.9   )-----------( 330      ( 05 Jun 2018 07:20 )             25.0     06-05    134<L>  |  100  |  8   ----------------------------<  97  5.3   |  22  |  0.73    Ca    8.4      05 Jun 2018 07:20  Phos  3.7     06-05  Mg     2.0     06-05      PT/INR - ( 04 Jun 2018 05:28 )   PT: 15.5 sec;   INR: 1.39          PTT - ( 04 Jun 2018 05:28 )  PTT:27.2 sec    CAPILLARY BLOOD GLUCOSE      Drug Levels: [] N/A  Vancomycin Level, Trough: 11 ug/mL (06-03 @ 10:53)    CSF Analysis: [] N/A      Allergies    penicillin (Hives)    Intolerances      MEDICATIONS:  Antibiotics:  vancomycin  IVPB 1500 milliGRAM(s) IV Intermittent every 12 hours    Neuro:  acetaminophen   Tablet 650 milliGRAM(s) Oral every 6 hours PRN  acetaminophen   Tablet. 650 milliGRAM(s) Oral every 6 hours PRN  acetaminophen  IVPB. 1000 milliGRAM(s) IV Intermittent once PRN  ALPRAZolam 1 milliGRAM(s) Oral three times a day PRN  HYDROmorphone  Injectable 0.5 milliGRAM(s) IV Push every 1 hour PRN  HYDROmorphone  Injectable 0.5 milliGRAM(s) IV Push every 1 hour PRN  methocarbamol 750 milliGRAM(s) Oral every 8 hours PRN  ondansetron Injectable 4 milliGRAM(s) IV Push every 6 hours PRN  oxyCODONE    5 mG/acetaminophen 325 mG 2 Tablet(s) Oral every 4 hours PRN  oxyCODONE    5 mG/acetaminophen 325 mG 1 Tablet(s) Oral every 4 hours PRN  zolpidem 5 milliGRAM(s) Oral at bedtime PRN    Anticoagulation:  aspirin  chewable 81 milliGRAM(s) Oral daily    OTHER:  ALBUTerol    90 MICROgram(s) HFA Inhaler 2 Puff(s) Inhalation every 6 hours PRN  amLODIPine   Tablet 10 milliGRAM(s) Oral daily  atorvastatin 40 milliGRAM(s) Oral at bedtime  docusate sodium 100 milliGRAM(s) Oral three times a day  fluticasone propionate (50 MICROgram(s)/actuation) Nasal Spray - Peds 1 Spray(s) Alternating Nostrils daily  pantoprazole    Tablet 40 milliGRAM(s) Oral before breakfast  senna 2 Tablet(s) Oral at bedtime PRN    IVF:  lactated ringers. 1000 milliLiter(s) IV Continuous <Continuous>    CULTURES:  Culture Results:   No growth at 12 hours (06-04 @ 16:45)  Culture Results:   No growth at 12 hours (06-04 @ 16:45)      ASSESSMENT:  67y Male s/p Washout of wound of spine  06/04/2018    2 Hemovacs      PLAN:  NEURO:    CARDIOVASCULAR:    PULMONARY:    RENAL:    GI:    HEME:    ID:    ENDO:    DVT PROPHYLAXIS:  [] Venodynes                                [] Heparin/Lovenox    FALL RISK:  [] Low Risk                                    [] Impulsive HPI:  HPI: Pt is s/p L4-L5 laminectomy with medial facetectomy and L4-S1 fusion on 5/16 with Dr. Alvarez for lumbar stenosis. He was seen in the office on Tuesday 5/29 for follow up appointment and staples were removed - wound appeared to be healing well at the time. Pt now  presents for low back pain and purulent drainage from wound since yesterday. Pt denies fever, chills, n/v/d, HA or blurry vision. He has no other signs of infection. Reports his LE weakness has improved since pre-op. (01 Jun 2018 18:02)    OVERNIGHT EVENTS:  Vital Signs Last 24 Hrs  T(C): 36.5 (05 Jun 2018 05:52), Max: 36.7 (04 Jun 2018 13:45)  T(F): 97.7 (05 Jun 2018 05:52), Max: 98.1 (04 Jun 2018 13:45)  HR: 69 (05 Jun 2018 05:52) (66 - 96)  BP: 119/57 (05 Jun 2018 05:52) (98/54 - 151/72)  BP(mean): 79 (04 Jun 2018 13:15) (73 - 86)  RR: 16 (05 Jun 2018 05:52) (14 - 18)  SpO2: 99% (05 Jun 2018 05:52) (97% - 100%)    I&O's Summary    04 Jun 2018 07:01  -  05 Jun 2018 07:00  --------------------------------------------------------  IN: 2190 mL / OUT: 1700 mL / NET: 490 mL        PHYSICAL EXAM:  Neurological:    A&OX3 Cranial nerves intact  BRIAN  Lami incision Cdi with hemovacs X2  One dislodge and removed    Cardiovascular: RRR  Respiratory: Lungs CTAB  Gastrointestinal: +BS  Genitourinary: voiding without dificulty  Extremities: warm and dry  Incision/Wound: CDI      DIET: Regular      LABS:                        8.0    6.9   )-----------( 330      ( 05 Jun 2018 07:20 )             25.0     06-05    134<L>  |  100  |  8   ----------------------------<  97  5.3   |  22  |  0.73    Ca    8.4      05 Jun 2018 07:20  Phos  3.7     06-05  Mg     2.0     06-05      PT/INR - ( 04 Jun 2018 05:28 )   PT: 15.5 sec;   INR: 1.39          PTT - ( 04 Jun 2018 05:28 )  PTT:27.2 sec    CAPILLARY BLOOD GLUCOSE      Drug Levels: [] N/A  Vancomycin Level, Trough: 11 ug/mL (06-03 @ 10:53)    CSF Analysis: [] N/A      Allergies    penicillin (Hives)    Intolerances      MEDICATIONS:  Antibiotics:  vancomycin  IVPB 1500 milliGRAM(s) IV Intermittent every 12 hours    Neuro:  acetaminophen   Tablet 650 milliGRAM(s) Oral every 6 hours PRN  acetaminophen   Tablet. 650 milliGRAM(s) Oral every 6 hours PRN  acetaminophen  IVPB. 1000 milliGRAM(s) IV Intermittent once PRN  ALPRAZolam 1 milliGRAM(s) Oral three times a day PRN  HYDROmorphone  Injectable 0.5 milliGRAM(s) IV Push every 1 hour PRN  HYDROmorphone  Injectable 0.5 milliGRAM(s) IV Push every 1 hour PRN  methocarbamol 750 milliGRAM(s) Oral every 8 hours PRN  ondansetron Injectable 4 milliGRAM(s) IV Push every 6 hours PRN  oxyCODONE    5 mG/acetaminophen 325 mG 2 Tablet(s) Oral every 4 hours PRN  oxyCODONE    5 mG/acetaminophen 325 mG 1 Tablet(s) Oral every 4 hours PRN  zolpidem 5 milliGRAM(s) Oral at bedtime PRN    Anticoagulation:  aspirin  chewable 81 milliGRAM(s) Oral daily    OTHER:  ALBUTerol    90 MICROgram(s) HFA Inhaler 2 Puff(s) Inhalation every 6 hours PRN  amLODIPine   Tablet 10 milliGRAM(s) Oral daily  atorvastatin 40 milliGRAM(s) Oral at bedtime  docusate sodium 100 milliGRAM(s) Oral three times a day  fluticasone propionate (50 MICROgram(s)/actuation) Nasal Spray - Peds 1 Spray(s) Alternating Nostrils daily  pantoprazole    Tablet 40 milliGRAM(s) Oral before breakfast  senna 2 Tablet(s) Oral at bedtime PRN    IVF:  lactated ringers. 1000 milliLiter(s) IV Continuous <Continuous>    CULTURES:  Culture Results:   No growth at 12 hours (06-04 @ 16:45)  Culture Results:   No growth at 12 hours (06-04 @ 16:45)      ASSESSMENT:  67y Male s/p Washout of wound of spine  06/04/2018    2 Hemovacs      PLAN:  NEURO:    Monitor neuro status  OT/PT  Remove one hemovac that is disconnected  IV antibx as per ID  F/U OR Cx and Blood Cx  Echo   Continue current medical regime    Dispo: Will continue to monitor

## 2018-06-05 NOTE — PROGRESS NOTE ADULT - SUBJECTIVE AND OBJECTIVE BOX
INTERVAL HPI/OVERNIGHT EVENTS:    CONSTITUTIONAL:  Negative fever or chills, feels well, good appetite  EYES:  Negative  blurry vision or double vision  CARDIOVASCULAR:  Negative for chest pain or palpitations  RESPIRATORY:  Negative for cough, wheezing, or SOB   GASTROINTESTINAL:  Negative for nausea, vomiting, diarrhea, constipation, or abdominal pain  GENITOURINARY:  Negative frequency, urgency or dysuria  NEUROLOGIC:  No headache, confusion, dizziness, lightheadedness      ANTIBIOTICS/RELEVANT:    MEDICATIONS  (STANDING):  amLODIPine   Tablet 10 milliGRAM(s) Oral daily  aspirin  chewable 81 milliGRAM(s) Oral daily  atorvastatin 40 milliGRAM(s) Oral at bedtime  docusate sodium 100 milliGRAM(s) Oral three times a day  fluticasone propionate (50 MICROgram(s)/actuation) Nasal Spray - Peds 1 Spray(s) Alternating Nostrils daily  lactated ringers. 1000 milliLiter(s) (70 mL/Hr) IV Continuous <Continuous>  pantoprazole    Tablet 40 milliGRAM(s) Oral before breakfast  vancomycin  IVPB 1500 milliGRAM(s) IV Intermittent every 12 hours    MEDICATIONS  (PRN):  acetaminophen   Tablet 650 milliGRAM(s) Oral every 6 hours PRN For Temp greater than 38 C (100.4 F)  acetaminophen   Tablet. 650 milliGRAM(s) Oral every 6 hours PRN Mild Pain (1 - 3)  acetaminophen  IVPB. 1000 milliGRAM(s) IV Intermittent once PRN Moderate Pain (4 - 6)  ALBUTerol    90 MICROgram(s) HFA Inhaler 2 Puff(s) Inhalation every 6 hours PRN Wheezing  ALPRAZolam 1 milliGRAM(s) Oral three times a day PRN anxiety  HYDROmorphone  Injectable 0.5 milliGRAM(s) IV Push every 1 hour PRN Severe Pain (7 - 10)  HYDROmorphone  Injectable 0.5 milliGRAM(s) IV Push every 1 hour PRN breakthrough pain  methocarbamol 750 milliGRAM(s) Oral every 8 hours PRN Spasms/Stiffness  ondansetron Injectable 4 milliGRAM(s) IV Push every 6 hours PRN Nausea and/or Vomiting  oxyCODONE    5 mG/acetaminophen 325 mG 2 Tablet(s) Oral every 4 hours PRN Severe Pain (7 - 10)  oxyCODONE    5 mG/acetaminophen 325 mG 1 Tablet(s) Oral every 4 hours PRN Moderate Pain (4 - 6)  senna 2 Tablet(s) Oral at bedtime PRN Constipation  zolpidem 5 milliGRAM(s) Oral at bedtime PRN Insomnia        Vital Signs Last 24 Hrs  T(C): 36.5 (05 Jun 2018 05:52), Max: 36.7 (04 Jun 2018 13:45)  T(F): 97.7 (05 Jun 2018 05:52), Max: 98.1 (04 Jun 2018 13:45)  HR: 69 (05 Jun 2018 05:52) (66 - 96)  BP: 119/57 (05 Jun 2018 05:52) (98/54 - 151/72)  BP(mean): 79 (04 Jun 2018 13:15) (73 - 86)  RR: 16 (05 Jun 2018 05:52) (14 - 18)  SpO2: 99% (05 Jun 2018 05:52) (97% - 100%)    06-04-18 @ 07:01  -  06-05-18 @ 07:00  --------------------------------------------------------  IN: 2190 mL / OUT: 1700 mL / NET: 490 mL      PHYSICAL EXAM:  Constitutional:Well-developed, well nourished  Eyes:ATILIO, EOMI  Ear/Nose/Throat: no oral lesion, no sinus tenderness on percussion	  Neck:no JVD, no lymphadenopathy, supple  Respiratory: CTA kathleen  Cardiovascular: S1S2 RRR, no murmurs  Gastrointestinal:soft, (+) BS, no HSM  Extremities:no e/e/c  Vascular: DP Pulse:	right normal; left normal      LABS:                        8.6    6.8   )-----------( 342      ( 04 Jun 2018 05:28 )             26.7     06-04    138  |  101  |  9   ----------------------------<  106<H>  4.4   |  26  |  0.85    Ca    8.8      04 Jun 2018 05:28  Phos  3.9     06-04  Mg     2.2     06-04      PT/INR - ( 04 Jun 2018 05:28 )   PT: 15.5 sec;   INR: 1.39          PTT - ( 04 Jun 2018 05:28 )  PTT:27.2 sec      MICROBIOLOGY:    RADIOLOGY & ADDITIONAL STUDIES: INTERVAL HPI/OVERNIGHT EVENTS:    Patient seen and examined at bedside. States back pain mildly improved currently 6/10 in intensity. reports surgery trip yesterday was uneventful. S/p 2 drains placed. as per chart review, "infection was noted to go down to the instrumentation."     CONSTITUTIONAL:  Negative fever or chills, + mildly reduced appetitie  CARDIOVASCULAR:  Negative for chest pain or palpitations  RESPIRATORY:  Negative for cough, wheezing, or SOB   GASTROINTESTINAL:  Negative for nausea, vomiting, diarrhea, constipation, or abdominal pain  GENITOURINARY:  Negative frequency, urgency or dysuria  NEUROLOGIC:  No headache, confusion, dizziness, lightheadedness      ANTIBIOTICS/RELEVANT:    MEDICATIONS  (STANDING):  amLODIPine   Tablet 10 milliGRAM(s) Oral daily  aspirin  chewable 81 milliGRAM(s) Oral daily  atorvastatin 40 milliGRAM(s) Oral at bedtime  docusate sodium 100 milliGRAM(s) Oral three times a day  fluticasone propionate (50 MICROgram(s)/actuation) Nasal Spray - Peds 1 Spray(s) Alternating Nostrils daily  lactated ringers. 1000 milliLiter(s) (70 mL/Hr) IV Continuous <Continuous>  pantoprazole    Tablet 40 milliGRAM(s) Oral before breakfast  vancomycin  IVPB 1500 milliGRAM(s) IV Intermittent every 12 hours    MEDICATIONS  (PRN):  acetaminophen   Tablet 650 milliGRAM(s) Oral every 6 hours PRN For Temp greater than 38 C (100.4 F)  acetaminophen   Tablet. 650 milliGRAM(s) Oral every 6 hours PRN Mild Pain (1 - 3)  acetaminophen  IVPB. 1000 milliGRAM(s) IV Intermittent once PRN Moderate Pain (4 - 6)  ALBUTerol    90 MICROgram(s) HFA Inhaler 2 Puff(s) Inhalation every 6 hours PRN Wheezing  ALPRAZolam 1 milliGRAM(s) Oral three times a day PRN anxiety  HYDROmorphone  Injectable 0.5 milliGRAM(s) IV Push every 1 hour PRN Severe Pain (7 - 10)  HYDROmorphone  Injectable 0.5 milliGRAM(s) IV Push every 1 hour PRN breakthrough pain  methocarbamol 750 milliGRAM(s) Oral every 8 hours PRN Spasms/Stiffness  ondansetron Injectable 4 milliGRAM(s) IV Push every 6 hours PRN Nausea and/or Vomiting  oxyCODONE    5 mG/acetaminophen 325 mG 2 Tablet(s) Oral every 4 hours PRN Severe Pain (7 - 10)  oxyCODONE    5 mG/acetaminophen 325 mG 1 Tablet(s) Oral every 4 hours PRN Moderate Pain (4 - 6)  senna 2 Tablet(s) Oral at bedtime PRN Constipation  zolpidem 5 milliGRAM(s) Oral at bedtime PRN Insomnia        Vital Signs Last 24 Hrs  T(C): 36.5 (05 Jun 2018 05:52), Max: 36.7 (04 Jun 2018 13:45)  T(F): 97.7 (05 Jun 2018 05:52), Max: 98.1 (04 Jun 2018 13:45)  HR: 69 (05 Jun 2018 05:52) (66 - 96)  BP: 119/57 (05 Jun 2018 05:52) (98/54 - 151/72)  BP(mean): 79 (04 Jun 2018 13:15) (73 - 86)  RR: 16 (05 Jun 2018 05:52) (14 - 18)  SpO2: 99% (05 Jun 2018 05:52) (97% - 100%)    06-04-18 @ 07:01  -  06-05-18 @ 07:00  --------------------------------------------------------  IN: 2190 mL / OUT: 1700 mL / NET: 490 mL      PHYSICAL EXAM:  Constitutional: Well-developed, well nourished, many tattoos noted on skin   Eyes:ATILIO, EOMI  Ear/Nose/Throat: no oral lesion, no sinus tenderness on percussion	  Neck:no JVD, no lymphadenopathy, supple  Respiratory: CTA kathleen  Cardiovascular: S1S2 RRR, no murmurs  Gastrointestinal:soft, (+) BS, no HSM  Extremities:no e/e/c  Skin: lumbar wound dressing in place mildly soaked with bloody discharge; 2 drains noted to emanate from, location in the back- SS fluid noted in vacs  Vascular: radial pulses 2+ B/L        LABS:                        8.6    6.8   )-----------( 342      ( 04 Jun 2018 05:28 )             26.7     06-04    138  |  101  |  9   ----------------------------<  106<H>  4.4   |  26  |  0.85    Ca    8.8      04 Jun 2018 05:28  Phos  3.9     06-04  Mg     2.2     06-04      PT/INR - ( 04 Jun 2018 05:28 )   PT: 15.5 sec;   INR: 1.39          PTT - ( 04 Jun 2018 05:28 )  PTT:27.2 sec      MICROBIOLOGY:    Culture - Blood (06.04.18 @ 16:45)    Specimen Source: .Blood Blood-Peripheral    Culture Results:   No growth at 12 hours      Culture - Surgical Swab (06.01.18 @ 16:52)    Gram Stain:   Few Gram Positive Cocci in Clusters  Moderate White blood cells    -  Cefazolin: R 16    -  Clindamycin: S <=0.5    -  Daptomycin: S 1    -  Erythromycin: R >4    -  Linezolid: S 4    -  Oxacillin: R >2    -  Penicillin: R >8    -  RIF- Rifampin: S <=1    -  Tetra/Doxy: S <=4    -  Trimethoprim/Sulfamethoxazole: S <=0.5/9.5    -  Vancomycin: S 0.75    Specimen Source: .Surgical Swab lumbar incision    Culture Results:   Numerous Methicillin resistant Staphylococcus aureus  Result called to and read back byUrbano Zhou RN  06/02/2018 14:24:48    Organism Identification: Methicillin resistant Staphylococcus aureus  Methicillin resistant Staphylococcus aureus    Organism: Methicillin resistant Staphylococcus aureus    Organism: Methicillin resistant Staphylococcus aureus    Method Type: ETEST    Method Type: ANAI      Culture - Blood (06.01.18 @ 16:35)    Gram Stain:   Anaerobic Bottle: Gram Positive Cocci in Clusters  Result called to and read back byUrbano Spangler RN (9 WO) 06/03/2018 15:06  ***Blood Panel PCR results on this specimen are available  approximately 3 hours after the Gram stain result.***  Gram stain, PCR, and/or culture results may not always  correspond due to difference in methodologies.  ************************************************************  This PCR assay was performed using [employer].  The following targets are tested for: Enterococcus,  vancomycin resistant enterococci, Listeria monocytogenes,  coagulase negative staphylococci, S. aureus,  methicillin resistant S. aureus, Streptococcus agalactiae  (Group B), S. pneumoniae, S. pyogenes (Group A),  Acinetobacter baumannii,Enterobacter cloacae, E. coli,  Klebsiella oxytoca, K. pneumoniae, Proteus sp.,  Serratia marcescens, Haemophilus influenzae,  Neisseria meningitidis, Pseudomonas aeruginosa, Candida  albicans, C. glabrata, C krusei, C parapsilosis,  C. tropicalis and the KPC resistance gene.  "Due to technical problems, Proteus sp. will Not be reported as part of  the BCID panel until further notice"    -  Methicillin resistant Staphylococcus aureus (MRSA): Detec    Specimen Source: .Blood Blood    Organism: Blood Culture PCR    Culture Results:   Growth in aerobic bottle: Staphylococcus aureus  Identification and susceptibility to follow.    Organism Identification: Blood Culture PCR    Method Type: PCR          RADIOLOGY & ADDITIONAL STUDIES: none       A/P: Patient is a 66 yo male with spinal stenosis who underwent L4-S1 fusion and L4-5 laminectomy on 5/16, presenting with two days of purulent discharge from surgical site, cx growing MRSA and bacteriemic with the same organisms. found to have L1-3 subdural fluid collection and severe spinal canal stenosis s/p washout on 6/4 with infection noted to track up to the instrumentation in the back; afebrile  -- f/u surveillance repeat Bcx to monitor clearance- (currently 12 hours negative)-   --  TTE ordered to look for source or vegetation on valve    -- f/u surgical cultures   -- continue vancomycin at 1.5g IV q12h and repeat trough prior to Tuesday 10:30 am dose    Discussed with ID attending; will continue to follow INTERVAL HPI/OVERNIGHT EVENTS:    Patient seen and examined at bedside. States back pain mildly improved currently 6/10 in intensity. reports surgery trip yesterday was uneventful. S/p 2 drains placed. as per chart review, "infection was noted to go down to the instrumentation."     CONSTITUTIONAL:  Negative fever or chills, + mildly reduced appetitie  CARDIOVASCULAR:  Negative for chest pain or palpitations  RESPIRATORY:  Negative for cough, wheezing, or SOB   GASTROINTESTINAL:  Negative for nausea, vomiting, diarrhea, constipation, or abdominal pain  GENITOURINARY:  Negative frequency, urgency or dysuria  NEUROLOGIC:  No headache, confusion, dizziness, lightheadedness      ANTIBIOTICS/RELEVANT:    MEDICATIONS  (STANDING):  amLODIPine   Tablet 10 milliGRAM(s) Oral daily  aspirin  chewable 81 milliGRAM(s) Oral daily  atorvastatin 40 milliGRAM(s) Oral at bedtime  docusate sodium 100 milliGRAM(s) Oral three times a day  fluticasone propionate (50 MICROgram(s)/actuation) Nasal Spray - Peds 1 Spray(s) Alternating Nostrils daily  lactated ringers. 1000 milliLiter(s) (70 mL/Hr) IV Continuous <Continuous>  pantoprazole    Tablet 40 milliGRAM(s) Oral before breakfast  vancomycin  IVPB 1500 milliGRAM(s) IV Intermittent every 12 hours    MEDICATIONS  (PRN):  acetaminophen   Tablet 650 milliGRAM(s) Oral every 6 hours PRN For Temp greater than 38 C (100.4 F)  acetaminophen   Tablet. 650 milliGRAM(s) Oral every 6 hours PRN Mild Pain (1 - 3)  acetaminophen  IVPB. 1000 milliGRAM(s) IV Intermittent once PRN Moderate Pain (4 - 6)  ALBUTerol    90 MICROgram(s) HFA Inhaler 2 Puff(s) Inhalation every 6 hours PRN Wheezing  ALPRAZolam 1 milliGRAM(s) Oral three times a day PRN anxiety  HYDROmorphone  Injectable 0.5 milliGRAM(s) IV Push every 1 hour PRN Severe Pain (7 - 10)  HYDROmorphone  Injectable 0.5 milliGRAM(s) IV Push every 1 hour PRN breakthrough pain  methocarbamol 750 milliGRAM(s) Oral every 8 hours PRN Spasms/Stiffness  ondansetron Injectable 4 milliGRAM(s) IV Push every 6 hours PRN Nausea and/or Vomiting  oxyCODONE    5 mG/acetaminophen 325 mG 2 Tablet(s) Oral every 4 hours PRN Severe Pain (7 - 10)  oxyCODONE    5 mG/acetaminophen 325 mG 1 Tablet(s) Oral every 4 hours PRN Moderate Pain (4 - 6)  senna 2 Tablet(s) Oral at bedtime PRN Constipation  zolpidem 5 milliGRAM(s) Oral at bedtime PRN Insomnia        Vital Signs Last 24 Hrs  T(C): 36.5 (05 Jun 2018 05:52), Max: 36.7 (04 Jun 2018 13:45)  T(F): 97.7 (05 Jun 2018 05:52), Max: 98.1 (04 Jun 2018 13:45)  HR: 69 (05 Jun 2018 05:52) (66 - 96)  BP: 119/57 (05 Jun 2018 05:52) (98/54 - 151/72)  BP(mean): 79 (04 Jun 2018 13:15) (73 - 86)  RR: 16 (05 Jun 2018 05:52) (14 - 18)  SpO2: 99% (05 Jun 2018 05:52) (97% - 100%)    06-04-18 @ 07:01  -  06-05-18 @ 07:00  --------------------------------------------------------  IN: 2190 mL / OUT: 1700 mL / NET: 490 mL      PHYSICAL EXAM:  Constitutional: Well-developed, well nourished, many tattoos noted on skin   Eyes:ATILIO, EOMI  Ear/Nose/Throat: no oral lesion, no sinus tenderness on percussion	  Neck:no JVD, no lymphadenopathy, supple  Respiratory: CTA kathleen  Cardiovascular: S1S2 RRR, no murmurs  Gastrointestinal:soft, (+) BS, no HSM  Extremities:no e/e/c  Skin: lumbar wound dressing in place mildly soaked with bloody discharge; 2 drains noted to emanate from, location in the back- SS fluid noted in vacs  Vascular: radial pulses 2+ B/L        LABS:                        8.6    6.8   )-----------( 342      ( 04 Jun 2018 05:28 )             26.7     06-04    138  |  101  |  9   ----------------------------<  106<H>  4.4   |  26  |  0.85    Ca    8.8      04 Jun 2018 05:28  Phos  3.9     06-04  Mg     2.2     06-04      PT/INR - ( 04 Jun 2018 05:28 )   PT: 15.5 sec;   INR: 1.39          PTT - ( 04 Jun 2018 05:28 )  PTT:27.2 sec      MICROBIOLOGY:    Culture - Blood (06.04.18 @ 16:45)    Specimen Source: .Blood Blood-Peripheral    Culture Results:   No growth at 12 hours      Culture - Surgical Swab (06.01.18 @ 16:52)    Gram Stain:   Few Gram Positive Cocci in Clusters  Moderate White blood cells    -  Cefazolin: R 16    -  Clindamycin: S <=0.5    -  Daptomycin: S 1    -  Erythromycin: R >4    -  Linezolid: S 4    -  Oxacillin: R >2    -  Penicillin: R >8    -  RIF- Rifampin: S <=1    -  Tetra/Doxy: S <=4    -  Trimethoprim/Sulfamethoxazole: S <=0.5/9.5    -  Vancomycin: S 0.75    Specimen Source: .Surgical Swab lumbar incision    Culture Results:   Numerous Methicillin resistant Staphylococcus aureus  Result called to and read back byUrbano Zhou RN  06/02/2018 14:24:48    Organism Identification: Methicillin resistant Staphylococcus aureus  Methicillin resistant Staphylococcus aureus    Organism: Methicillin resistant Staphylococcus aureus    Organism: Methicillin resistant Staphylococcus aureus    Method Type: ETEST    Method Type: ANAI      Culture - Blood (06.01.18 @ 16:35)    Gram Stain:   Anaerobic Bottle: Gram Positive Cocci in Clusters  Result called to and read back byUrbano Spangler RN (9 WO) 06/03/2018 15:06  ***Blood Panel PCR results on this specimen are available  approximately 3 hours after the Gram stain result.***  Gram stain, PCR, and/or culture results may not always  correspond due to difference in methodologies.  ************************************************************  This PCR assay was performed using [employer].  The following targets are tested for: Enterococcus,  vancomycin resistant enterococci, Listeria monocytogenes,  coagulase negative staphylococci, S. aureus,  methicillin resistant S. aureus, Streptococcus agalactiae  (Group B), S. pneumoniae, S. pyogenes (Group A),  Acinetobacter baumannii,Enterobacter cloacae, E. coli,  Klebsiella oxytoca, K. pneumoniae, Proteus sp.,  Serratia marcescens, Haemophilus influenzae,  Neisseria meningitidis, Pseudomonas aeruginosa, Candida  albicans, C. glabrata, C krusei, C parapsilosis,  C. tropicalis and the KPC resistance gene.  "Due to technical problems, Proteus sp. will Not be reported as part of  the BCID panel until further notice"    -  Methicillin resistant Staphylococcus aureus (MRSA): Detec    Specimen Source: .Blood Blood    Organism: Blood Culture PCR    Culture Results:   Growth in aerobic bottle: Staphylococcus aureus  Identification and susceptibility to follow.    Organism Identification: Blood Culture PCR    Method Type: PCR          RADIOLOGY & ADDITIONAL STUDIES: none       A/P: Patient is a 66 yo male with spinal stenosis who underwent L4-S1 fusion and L4-5 laminectomy on 5/16, presenting with two days of purulent discharge from surgical site, cx growing MRSA and bacteriemic with the same organisms. 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     Discussed with ID attending; will continue to follow INTERVAL HPI/OVERNIGHT EVENTS:    Patient seen and examined at bedside. States back pain mildly improved currently 6/10 in intensity. reports surgery trip yesterday was uneventful. S/p 2 drains placed. as per chart review, "infection was noted to go down to the instrumentation."     CONSTITUTIONAL:  Negative fever or chills, + mildly reduced appetitie  CARDIOVASCULAR:  Negative for chest pain or palpitations  RESPIRATORY:  Negative for cough, wheezing, or SOB   GASTROINTESTINAL:  Negative for nausea, vomiting, diarrhea, constipation, or abdominal pain  GENITOURINARY:  Negative frequency, urgency or dysuria  NEUROLOGIC:  No headache, confusion, dizziness, lightheadedness      ANTIBIOTICS/RELEVANT:    MEDICATIONS  (STANDING):  amLODIPine   Tablet 10 milliGRAM(s) Oral daily  aspirin  chewable 81 milliGRAM(s) Oral daily  atorvastatin 40 milliGRAM(s) Oral at bedtime  docusate sodium 100 milliGRAM(s) Oral three times a day  fluticasone propionate (50 MICROgram(s)/actuation) Nasal Spray - Peds 1 Spray(s) Alternating Nostrils daily  lactated ringers. 1000 milliLiter(s) (70 mL/Hr) IV Continuous <Continuous>  pantoprazole    Tablet 40 milliGRAM(s) Oral before breakfast  vancomycin  IVPB 1500 milliGRAM(s) IV Intermittent every 12 hours    MEDICATIONS  (PRN):  acetaminophen   Tablet 650 milliGRAM(s) Oral every 6 hours PRN For Temp greater than 38 C (100.4 F)  acetaminophen   Tablet. 650 milliGRAM(s) Oral every 6 hours PRN Mild Pain (1 - 3)  acetaminophen  IVPB. 1000 milliGRAM(s) IV Intermittent once PRN Moderate Pain (4 - 6)  ALBUTerol    90 MICROgram(s) HFA Inhaler 2 Puff(s) Inhalation every 6 hours PRN Wheezing  ALPRAZolam 1 milliGRAM(s) Oral three times a day PRN anxiety  HYDROmorphone  Injectable 0.5 milliGRAM(s) IV Push every 1 hour PRN Severe Pain (7 - 10)  HYDROmorphone  Injectable 0.5 milliGRAM(s) IV Push every 1 hour PRN breakthrough pain  methocarbamol 750 milliGRAM(s) Oral every 8 hours PRN Spasms/Stiffness  ondansetron Injectable 4 milliGRAM(s) IV Push every 6 hours PRN Nausea and/or Vomiting  oxyCODONE    5 mG/acetaminophen 325 mG 2 Tablet(s) Oral every 4 hours PRN Severe Pain (7 - 10)  oxyCODONE    5 mG/acetaminophen 325 mG 1 Tablet(s) Oral every 4 hours PRN Moderate Pain (4 - 6)  senna 2 Tablet(s) Oral at bedtime PRN Constipation  zolpidem 5 milliGRAM(s) Oral at bedtime PRN Insomnia        Vital Signs Last 24 Hrs  T(C): 36.5 (05 Jun 2018 05:52), Max: 36.7 (04 Jun 2018 13:45)  T(F): 97.7 (05 Jun 2018 05:52), Max: 98.1 (04 Jun 2018 13:45)  HR: 69 (05 Jun 2018 05:52) (66 - 96)  BP: 119/57 (05 Jun 2018 05:52) (98/54 - 151/72)  BP(mean): 79 (04 Jun 2018 13:15) (73 - 86)  RR: 16 (05 Jun 2018 05:52) (14 - 18)  SpO2: 99% (05 Jun 2018 05:52) (97% - 100%)    06-04-18 @ 07:01  -  06-05-18 @ 07:00  --------------------------------------------------------  IN: 2190 mL / OUT: 1700 mL / NET: 490 mL      PHYSICAL EXAM:  Constitutional: Well-developed, well nourished, many tattoos noted on skin   Eyes:ATILIO, EOMI  Ear/Nose/Throat: no oral lesion, no sinus tenderness on percussion	  Neck:no JVD, no lymphadenopathy, supple  Respiratory: CTA kathleen  Cardiovascular: S1S2 RRR, no murmurs  Gastrointestinal:soft, (+) BS, no HSM  Extremities:no e/e/c  Skin: lumbar wound dressing in place mildly soaked with bloody discharge; 2 drains noted to emanate from, location in the back- SS fluid noted in vacs  Vascular: radial pulses 2+ B/L        LABS:                        8.6    6.8   )-----------( 342      ( 04 Jun 2018 05:28 )             26.7     06-04    138  |  101  |  9   ----------------------------<  106<H>  4.4   |  26  |  0.85    Ca    8.8      04 Jun 2018 05:28  Phos  3.9     06-04  Mg     2.2     06-04      PT/INR - ( 04 Jun 2018 05:28 )   PT: 15.5 sec;   INR: 1.39          PTT - ( 04 Jun 2018 05:28 )  PTT:27.2 sec      MICROBIOLOGY:    Culture - Blood (06.04.18 @ 16:45)    Specimen Source: .Blood Blood-Peripheral    Culture Results:   No growth at 12 hours      Culture - Surgical Swab (06.01.18 @ 16:52)    Gram Stain:   Few Gram Positive Cocci in Clusters  Moderate White blood cells    -  Cefazolin: R 16    -  Clindamycin: S <=0.5    -  Daptomycin: S 1    -  Erythromycin: R >4    -  Linezolid: S 4    -  Oxacillin: R >2    -  Penicillin: R >8    -  RIF- Rifampin: S <=1    -  Tetra/Doxy: S <=4    -  Trimethoprim/Sulfamethoxazole: S <=0.5/9.5    -  Vancomycin: S 0.75    Specimen Source: .Surgical Swab lumbar incision    Culture Results:   Numerous Methicillin resistant Staphylococcus aureus  Result called to and read back byUrbano Zhou RN  06/02/2018 14:24:48    Organism Identification: Methicillin resistant Staphylococcus aureus  Methicillin resistant Staphylococcus aureus    Organism: Methicillin resistant Staphylococcus aureus    Organism: Methicillin resistant Staphylococcus aureus    Method Type: ETEST    Method Type: ANAI      Culture - Blood (06.01.18 @ 16:35)    Gram Stain:   Anaerobic Bottle: Gram Positive Cocci in Clusters  Result called to and read back byUrbano Spangler RN (9 WO) 06/03/2018 15:06  ***Blood Panel PCR results on this specimen are available  approximately 3 hours after the Gram stain result.***  Gram stain, PCR, and/or culture results may not always  correspond due to difference in methodologies.  ************************************************************  This PCR assay was performed using [employer].  The following targets are tested for: Enterococcus,  vancomycin resistant enterococci, Listeria monocytogenes,  coagulase negative staphylococci, S. aureus,  methicillin resistant S. aureus, Streptococcus agalactiae  (Group B), S. pneumoniae, S. pyogenes (Group A),  Acinetobacter baumannii,Enterobacter cloacae, E. coli,  Klebsiella oxytoca, K. pneumoniae, Proteus sp.,  Serratia marcescens, Haemophilus influenzae,  Neisseria meningitidis, Pseudomonas aeruginosa, Candida  albicans, C. glabrata, C krusei, C parapsilosis,  C. tropicalis and the KPC resistance gene.  "Due to technical problems, Proteus sp. will Not be reported as part of  the BCID panel until further notice"    -  Methicillin resistant Staphylococcus aureus (MRSA): Detec    Specimen Source: .Blood Blood    Organism: Blood Culture PCR    Culture Results:   Growth in aerobic bottle: Staphylococcus aureus  Identification and susceptibility to follow.    Organism Identification: Blood Culture PCR    Method Type: PCR          RADIOLOGY & ADDITIONAL STUDIES: none       A/P: Patient is a 66 yo male with spinal stenosis who underwent L4-S1 fusion and L4-5 laminectomy on 5/16, presenting with two days of purulent discharge from surgical site, cx growing MRSA and bacteriemic with the same organisms. 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   -- Be wary of Rifampin when dispensing other medications as it is a notorious [id number] inducer. Patient takes Atorvastatin 40 mg QD at home. would recommend switching to Crestor 20 mg QD (therapeutic equivalent high intensity statin)     Discussed with ID attending; will continue to follow INTERVAL HPI/OVERNIGHT EVENTS:    Patient seen and examined at bedside. States back pain mildly improved currently 6/10 in intensity. reports surgery trip yesterday was uneventful. S/p 2 drains placed. as per chart review, "infection was noted to go down to the instrumentation."     CONSTITUTIONAL:  Negative fever or chills, + mildly reduced appetitie  CARDIOVASCULAR:  Negative for chest pain or palpitations  RESPIRATORY:  Negative for cough, wheezing, or SOB   GASTROINTESTINAL:  Negative for nausea, vomiting, diarrhea, constipation, or abdominal pain  GENITOURINARY:  Negative frequency, urgency or dysuria  NEUROLOGIC:  No headache, confusion, dizziness, lightheadedness      ANTIBIOTICS/RELEVANT:    MEDICATIONS  (STANDING):  amLODIPine   Tablet 10 milliGRAM(s) Oral daily  aspirin  chewable 81 milliGRAM(s) Oral daily  atorvastatin 40 milliGRAM(s) Oral at bedtime  docusate sodium 100 milliGRAM(s) Oral three times a day  fluticasone propionate (50 MICROgram(s)/actuation) Nasal Spray - Peds 1 Spray(s) Alternating Nostrils daily  lactated ringers. 1000 milliLiter(s) (70 mL/Hr) IV Continuous <Continuous>  pantoprazole    Tablet 40 milliGRAM(s) Oral before breakfast  vancomycin  IVPB 1500 milliGRAM(s) IV Intermittent every 12 hours    MEDICATIONS  (PRN):  acetaminophen   Tablet 650 milliGRAM(s) Oral every 6 hours PRN For Temp greater than 38 C (100.4 F)  acetaminophen   Tablet. 650 milliGRAM(s) Oral every 6 hours PRN Mild Pain (1 - 3)  acetaminophen  IVPB. 1000 milliGRAM(s) IV Intermittent once PRN Moderate Pain (4 - 6)  ALBUTerol    90 MICROgram(s) HFA Inhaler 2 Puff(s) Inhalation every 6 hours PRN Wheezing  ALPRAZolam 1 milliGRAM(s) Oral three times a day PRN anxiety  HYDROmorphone  Injectable 0.5 milliGRAM(s) IV Push every 1 hour PRN Severe Pain (7 - 10)  HYDROmorphone  Injectable 0.5 milliGRAM(s) IV Push every 1 hour PRN breakthrough pain  methocarbamol 750 milliGRAM(s) Oral every 8 hours PRN Spasms/Stiffness  ondansetron Injectable 4 milliGRAM(s) IV Push every 6 hours PRN Nausea and/or Vomiting  oxyCODONE    5 mG/acetaminophen 325 mG 2 Tablet(s) Oral every 4 hours PRN Severe Pain (7 - 10)  oxyCODONE    5 mG/acetaminophen 325 mG 1 Tablet(s) Oral every 4 hours PRN Moderate Pain (4 - 6)  senna 2 Tablet(s) Oral at bedtime PRN Constipation  zolpidem 5 milliGRAM(s) Oral at bedtime PRN Insomnia        Vital Signs Last 24 Hrs  T(C): 36.5 (05 Jun 2018 05:52), Max: 36.7 (04 Jun 2018 13:45)  T(F): 97.7 (05 Jun 2018 05:52), Max: 98.1 (04 Jun 2018 13:45)  HR: 69 (05 Jun 2018 05:52) (66 - 96)  BP: 119/57 (05 Jun 2018 05:52) (98/54 - 151/72)  BP(mean): 79 (04 Jun 2018 13:15) (73 - 86)  RR: 16 (05 Jun 2018 05:52) (14 - 18)  SpO2: 99% (05 Jun 2018 05:52) (97% - 100%)    06-04-18 @ 07:01  -  06-05-18 @ 07:00  --------------------------------------------------------  IN: 2190 mL / OUT: 1700 mL / NET: 490 mL      PHYSICAL EXAM:  Constitutional: Well-developed, well nourished, many tattoos noted on skin   Eyes:ATILIO, EOMI  Ear/Nose/Throat: no oral lesion, no sinus tenderness on percussion	  Neck:no JVD, no lymphadenopathy, supple  Respiratory: CTA kathleen  Cardiovascular: S1S2 RRR, no murmurs  Gastrointestinal:soft, (+) BS, no HSM  Extremities:no e/e/c  Skin: lumbar wound dressing in place mildly soaked with bloody discharge; 2 drains noted to emanate from, location in the back- SS fluid noted in vacs  Vascular: radial pulses 2+ B/L        LABS:                        8.6    6.8   )-----------( 342      ( 04 Jun 2018 05:28 )             26.7     06-04    138  |  101  |  9   ----------------------------<  106<H>  4.4   |  26  |  0.85    Ca    8.8      04 Jun 2018 05:28  Phos  3.9     06-04  Mg     2.2     06-04      PT/INR - ( 04 Jun 2018 05:28 )   PT: 15.5 sec;   INR: 1.39          PTT - ( 04 Jun 2018 05:28 )  PTT:27.2 sec      MICROBIOLOGY:    Culture - Blood (06.04.18 @ 16:45)    Specimen Source: .Blood Blood-Peripheral    Culture Results:   No growth at 12 hours      Culture - Surgical Swab (06.01.18 @ 16:52)    Gram Stain:   Few Gram Positive Cocci in Clusters  Moderate White blood cells    -  Cefazolin: R 16    -  Clindamycin: S <=0.5    -  Daptomycin: S 1    -  Erythromycin: R >4    -  Linezolid: S 4    -  Oxacillin: R >2    -  Penicillin: R >8    -  RIF- Rifampin: S <=1    -  Tetra/Doxy: S <=4    -  Trimethoprim/Sulfamethoxazole: S <=0.5/9.5    -  Vancomycin: S 0.75    Specimen Source: .Surgical Swab lumbar incision    Culture Results:   Numerous Methicillin resistant Staphylococcus aureus  Result called to and read back byUrbano Zhou RN  06/02/2018 14:24:48    Organism Identification: Methicillin resistant Staphylococcus aureus  Methicillin resistant Staphylococcus aureus    Organism: Methicillin resistant Staphylococcus aureus    Organism: Methicillin resistant Staphylococcus aureus    Method Type: ETEST    Method Type: ANAI      Culture - Blood (06.01.18 @ 16:35)    Gram Stain:   Anaerobic Bottle: Gram Positive Cocci in Clusters  Result called to and read back byUrbano Spangler RN (9 WO) 06/03/2018 15:06  ***Blood Panel PCR results on this specimen are available  approximately 3 hours after the Gram stain result.***  Gram stain, PCR, and/or culture results may not always  correspond due to difference in methodologies.  ************************************************************  This PCR assay was performed using [employer].  The following targets are tested for: Enterococcus,  vancomycin resistant enterococci, Listeria monocytogenes,  coagulase negative staphylococci, S. aureus,  methicillin resistant S. aureus, Streptococcus agalactiae  (Group B), S. pneumoniae, S. pyogenes (Group A),  Acinetobacter baumannii,Enterobacter cloacae, E. coli,  Klebsiella oxytoca, K. pneumoniae, Proteus sp.,  Serratia marcescens, Haemophilus influenzae,  Neisseria meningitidis, Pseudomonas aeruginosa, Candida  albicans, C. glabrata, C krusei, C parapsilosis,  C. tropicalis and the KPC resistance gene.  "Due to technical problems, Proteus sp. will Not be reported as part of  the BCID panel until further notice"    -  Methicillin resistant Staphylococcus aureus (MRSA): Detec    Specimen Source: .Blood Blood    Organism: Blood Culture PCR    Culture Results:   Growth in aerobic bottle: Staphylococcus aureus  Identification and susceptibility to follow.    Organism Identification: Blood Culture PCR    Method Type: PCR          RADIOLOGY & ADDITIONAL STUDIES: none       A/P: Patient is a 68 yo male with spinal stenosis who underwent L4-S1 fusion and L4-5 laminectomy on 5/16, presenting with two days of purulent discharge from surgical site, cx growing MRSA and bacteriemic with the same organisms. 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   -- Be wary of Rifampin when dispensing other medications as it is a notorious [id number] inducer. Patient takes Atorvastatin 40 mg QD at home. would recommend switching to Crestor 20 mg QD (therapeutic equivalent high intensity statin)

## 2018-06-05 NOTE — PROGRESS NOTE ADULT - ATTENDING COMMENTS
Pt with staph wound infection with evidence of fluid collections on MR.  Will proceed with wound drainage and wash out. Continue broad spectrum antibiotics.  Reviewed plan of care with Mr. Lombardi and his wife.  They wish to proceed
May proceed with PICC line placement 6/6 assuming surveillance blood cultures from 6/4 remain negative.   Plan to complete 6 week course of above antibiotics (anticipated end date 7/16/18).  Please check qweekly CBC, CMP, CRP, ESR, vancomycin trough-->have labs faxed to my office [phone]  Please arrange outpatient ID follow up with me in 2 weeks-->[phone] option 2 to arrange    Please reconsult with ?

## 2018-06-05 NOTE — PHYSICAL THERAPY INITIAL EVALUATION ADULT - PERTINENT HX OF CURRENT PROBLEM, REHAB EVAL
67y Male s/p Washout of wound of spine 06/04/2018 2/2 MRSA (+) wound, s/p L4-L5 lami, medical facetectomy, fusion 5/16/18

## 2018-06-06 LAB
-  CEFAZOLIN: SIGNIFICANT CHANGE UP
-  CEFAZOLIN: SIGNIFICANT CHANGE UP
-  CLINDAMYCIN: SIGNIFICANT CHANGE UP
-  CLINDAMYCIN: SIGNIFICANT CHANGE UP
-  DAPTOMYCIN: SIGNIFICANT CHANGE UP
-  DAPTOMYCIN: SIGNIFICANT CHANGE UP
-  ERYTHROMYCIN: SIGNIFICANT CHANGE UP
-  ERYTHROMYCIN: SIGNIFICANT CHANGE UP
-  LINEZOLID: SIGNIFICANT CHANGE UP
-  LINEZOLID: SIGNIFICANT CHANGE UP
-  OXACILLIN: SIGNIFICANT CHANGE UP
-  OXACILLIN: SIGNIFICANT CHANGE UP
-  PENICILLIN: SIGNIFICANT CHANGE UP
-  PENICILLIN: SIGNIFICANT CHANGE UP
-  RIFAMPIN: SIGNIFICANT CHANGE UP
-  RIFAMPIN: SIGNIFICANT CHANGE UP
-  TETRACYCLINE: SIGNIFICANT CHANGE UP
-  TETRACYCLINE: SIGNIFICANT CHANGE UP
-  TRIMETHOPRIM/SULFAMETHOXAZOLE: SIGNIFICANT CHANGE UP
-  TRIMETHOPRIM/SULFAMETHOXAZOLE: SIGNIFICANT CHANGE UP
-  VANCOMYCIN: SIGNIFICANT CHANGE UP
ANION GAP SERPL CALC-SCNC: 10 MMOL/L — SIGNIFICANT CHANGE UP (ref 5–17)
BUN SERPL-MCNC: 4 MG/DL — LOW (ref 7–23)
CALCIUM SERPL-MCNC: 8.8 MG/DL — SIGNIFICANT CHANGE UP (ref 8.4–10.5)
CHLORIDE SERPL-SCNC: 107 MMOL/L — SIGNIFICANT CHANGE UP (ref 96–108)
CO2 SERPL-SCNC: 26 MMOL/L — SIGNIFICANT CHANGE UP (ref 22–31)
CREAT SERPL-MCNC: 0.81 MG/DL — SIGNIFICANT CHANGE UP (ref 0.5–1.3)
CULTURE RESULTS: SIGNIFICANT CHANGE UP
GLUCOSE SERPL-MCNC: 96 MG/DL — SIGNIFICANT CHANGE UP (ref 70–99)
HCT VFR BLD CALC: 25.9 % — LOW (ref 39–50)
HGB BLD-MCNC: 8.3 G/DL — LOW (ref 13–17)
MAGNESIUM SERPL-MCNC: 2 MG/DL — SIGNIFICANT CHANGE UP (ref 1.6–2.6)
MCHC RBC-ENTMCNC: 29.2 PG — SIGNIFICANT CHANGE UP (ref 27–34)
MCHC RBC-ENTMCNC: 32 G/DL — SIGNIFICANT CHANGE UP (ref 32–36)
MCV RBC AUTO: 91.2 FL — SIGNIFICANT CHANGE UP (ref 80–100)
METHOD TYPE: SIGNIFICANT CHANGE UP
ORGANISM # SPEC MICROSCOPIC CNT: SIGNIFICANT CHANGE UP
PHOSPHATE SERPL-MCNC: 3.7 MG/DL — SIGNIFICANT CHANGE UP (ref 2.5–4.5)
PLATELET # BLD AUTO: 360 K/UL — SIGNIFICANT CHANGE UP (ref 150–400)
POTASSIUM SERPL-MCNC: 4.4 MMOL/L — SIGNIFICANT CHANGE UP (ref 3.5–5.3)
POTASSIUM SERPL-SCNC: 4.4 MMOL/L — SIGNIFICANT CHANGE UP (ref 3.5–5.3)
RBC # BLD: 2.84 M/UL — LOW (ref 4.2–5.8)
RBC # FLD: 14.6 % — SIGNIFICANT CHANGE UP (ref 10.3–16.9)
SODIUM SERPL-SCNC: 143 MMOL/L — SIGNIFICANT CHANGE UP (ref 135–145)
SPECIMEN SOURCE: SIGNIFICANT CHANGE UP
WBC # BLD: 5.4 K/UL — SIGNIFICANT CHANGE UP (ref 3.8–10.5)
WBC # FLD AUTO: 5.4 K/UL — SIGNIFICANT CHANGE UP (ref 3.8–10.5)

## 2018-06-06 RX ORDER — ENOXAPARIN SODIUM 100 MG/ML
40 INJECTION SUBCUTANEOUS AT BEDTIME
Qty: 0 | Refills: 0 | Status: DISCONTINUED | OUTPATIENT
Start: 2018-06-06 | End: 2018-06-07

## 2018-06-06 RX ADMIN — Medication 100 MILLIGRAM(S): at 22:57

## 2018-06-06 RX ADMIN — Medication 300 MILLIGRAM(S): at 14:28

## 2018-06-06 RX ADMIN — SODIUM CHLORIDE 2 GRAM(S): 9 INJECTION INTRAMUSCULAR; INTRAVENOUS; SUBCUTANEOUS at 22:57

## 2018-06-06 RX ADMIN — ENOXAPARIN SODIUM 40 MILLIGRAM(S): 100 INJECTION SUBCUTANEOUS at 23:56

## 2018-06-06 RX ADMIN — SODIUM CHLORIDE 2 GRAM(S): 9 INJECTION INTRAMUSCULAR; INTRAVENOUS; SUBCUTANEOUS at 13:33

## 2018-06-06 RX ADMIN — OXYCODONE AND ACETAMINOPHEN 2 TABLET(S): 5; 325 TABLET ORAL at 06:52

## 2018-06-06 RX ADMIN — OXYCODONE AND ACETAMINOPHEN 2 TABLET(S): 5; 325 TABLET ORAL at 12:11

## 2018-06-06 RX ADMIN — Medication 81 MILLIGRAM(S): at 12:11

## 2018-06-06 RX ADMIN — AMLODIPINE BESYLATE 10 MILLIGRAM(S): 2.5 TABLET ORAL at 05:44

## 2018-06-06 RX ADMIN — OXYCODONE AND ACETAMINOPHEN 2 TABLET(S): 5; 325 TABLET ORAL at 13:00

## 2018-06-06 RX ADMIN — OXYCODONE AND ACETAMINOPHEN 2 TABLET(S): 5; 325 TABLET ORAL at 20:36

## 2018-06-06 RX ADMIN — Medication 100 MILLIGRAM(S): at 13:33

## 2018-06-06 RX ADMIN — OXYCODONE AND ACETAMINOPHEN 2 TABLET(S): 5; 325 TABLET ORAL at 05:52

## 2018-06-06 RX ADMIN — ROSUVASTATIN CALCIUM 20 MILLIGRAM(S): 5 TABLET ORAL at 22:58

## 2018-06-06 RX ADMIN — PANTOPRAZOLE SODIUM 40 MILLIGRAM(S): 20 TABLET, DELAYED RELEASE ORAL at 07:17

## 2018-06-06 RX ADMIN — Medication 1 SPRAY(S): at 12:12

## 2018-06-06 RX ADMIN — Medication 300 MILLIGRAM(S): at 03:00

## 2018-06-06 RX ADMIN — SODIUM CHLORIDE 2 GRAM(S): 9 INJECTION INTRAMUSCULAR; INTRAVENOUS; SUBCUTANEOUS at 05:44

## 2018-06-06 RX ADMIN — Medication 100 MILLIGRAM(S): at 05:44

## 2018-06-06 NOTE — PROGRESS NOTE ADULT - SUBJECTIVE AND OBJECTIVE BOX
HPI:  HPI: Pt is s/p L4-L5 laminectomy with medial facetectomy and L4-S1 fusion on 5/16 with Dr. Alvarez for lumbar stenosis. He was seen in the office on Tuesday 5/29 for follow up appointment and staples were removed - wound appeared to be healing well at the time. Pt now  presents for low back pain and purulent drainage from wound since yesterday. Pt denies fever, chills, n/v/d, HA or blurry vision. He has no other signs of infection. Reports his LE weakness has improved since pre-op. (01 Jun 2018 18:02)    OVERNIGHT EVENTS:  Pt c/o incisional pain, well controlled with meds. No leg pain. Ambulating.     Vital Signs Last 24 Hrs  T(C): 36.1 (06 Jun 2018 09:20), Max: 37.4 (05 Jun 2018 12:39)  T(F): 97 (06 Jun 2018 09:20), Max: 99.3 (05 Jun 2018 12:39)  HR: 86 (06 Jun 2018 09:20) (74 - 86)  BP: 117/71 (06 Jun 2018 09:20) (117/71 - 121/75)  BP(mean): --  RR: 16 (06 Jun 2018 09:20) (16 - 17)  SpO2: 100% (06 Jun 2018 09:20) (96% - 100%)    I&O's Summary    05 Jun 2018 07:01  -  06 Jun 2018 07:00  --------------------------------------------------------  IN: 1415 mL / OUT: 2230 mL / NET: -815 mL    06 Jun 2018 07:01  -  06 Jun 2018 11:34  --------------------------------------------------------  IN: 370 mL / OUT: 200 mL / NET: 170 mL        PHYSICAL EXAM:  Neurological:  AxOx3  5/5 motor x 4ext   sensory intact  Incision/Wound:C/D/I minor redness bottom of wound. no drainage    TUBES/LINES:  [] Griffin  [] Lumbar Drain  [x] Wound Drains- hemovac 70cc/shift, 180cc/24hr  [] Others      DIET:  [] NPO  [x] Mechanical  [] Tube feeds    LABS:                        8.3    5.4   )-----------( 360      ( 06 Jun 2018 05:50 )             25.9     06-06    143  |  107  |  4<L>  ----------------------------<  96  4.4   |  26  |  0.81    Ca    8.8      06 Jun 2018 05:50  Phos  3.7     06-06  Mg     2.0     06-06              CAPILLARY BLOOD GLUCOSE          Drug Levels: [] N/A  Vancomycin Level, Trough: 20 ug/mL (06-05 @ 13:06)  Vancomycin Level, Trough: 11 ug/mL (06-03 @ 10:53)    CSF Analysis: [] N/A      Allergies    penicillin (Hives)    Intolerances      MEDICATIONS:  Antibiotics:  rifampin 600 milliGRAM(s) Oral daily  vancomycin  IVPB 1500 milliGRAM(s) IV Intermittent every 12 hours    Neuro:  acetaminophen   Tablet 650 milliGRAM(s) Oral every 6 hours PRN  acetaminophen   Tablet. 650 milliGRAM(s) Oral every 6 hours PRN  ALPRAZolam 1 milliGRAM(s) Oral three times a day PRN  HYDROmorphone  Injectable 0.5 milliGRAM(s) IV Push every 1 hour PRN  HYDROmorphone  Injectable 0.5 milliGRAM(s) IV Push every 1 hour PRN  methocarbamol 750 milliGRAM(s) Oral every 8 hours PRN  ondansetron Injectable 4 milliGRAM(s) IV Push every 6 hours PRN  oxyCODONE    5 mG/acetaminophen 325 mG 2 Tablet(s) Oral every 4 hours PRN  oxyCODONE    5 mG/acetaminophen 325 mG 1 Tablet(s) Oral every 4 hours PRN  zolpidem 5 milliGRAM(s) Oral at bedtime PRN    Anticoagulation:  aspirin  chewable 81 milliGRAM(s) Oral daily    OTHER:  ALBUTerol    90 MICROgram(s) HFA Inhaler 2 Puff(s) Inhalation every 6 hours PRN  amLODIPine   Tablet 10 milliGRAM(s) Oral daily  docusate sodium 100 milliGRAM(s) Oral three times a day  fluticasone propionate (50 MICROgram(s)/actuation) Nasal Spray - Peds 1 Spray(s) Alternating Nostrils daily  pantoprazole    Tablet 40 milliGRAM(s) Oral before breakfast  rosuvastatin 20 milliGRAM(s) Oral at bedtime  senna 2 Tablet(s) Oral at bedtime PRN    IVF:  lactated ringers. 1000 milliLiter(s) IV Continuous <Continuous>  sodium chloride 2 Gram(s) Oral every 8 hours    CULTURES:  Culture Results:   No growth at 1 day. (06-04 @ 16:45)  Culture Results:   No growth at 1 day. (06-04 @ 16:45)    Culture - Surgical Swab (06.04.18 @ 13:28)    Gram Stain:   Rare Gram positive cocci in pairs  Numerous White blood cells    -  Cefazolin: R 8    -  Clindamycin: S <=0.5    -  Daptomycin: S 1    -  Erythromycin: R >4    -  Linezolid: S 4    -  Oxacillin: R >2    -  Penicillin: R >8    -  RIF- Rifampin: S <=1    -  Tetra/Doxy: S <=4    -  Trimethoprim/Sulfamethoxazole: S <=0.5/9.5    -  Vancomycin: S 1    -  Vancomycin: S 2    Specimen Source: .Surgical Swab O.R. SAMPLE - LUMBAR WND #2    Culture Results:   Few Methicillin resistant Staphylococcus aureus  Floor previously notified.    Organism Identification: Methicillin resistant Staphylococcus aureus  Methicillin resistant Staphylococcus aureus    Organism: Methicillin resistant Staphylococcus aureus    Organism: Methicillin resistant Staphylococcus aureus    Method Type: ETEST    Method Type: ANAI        RADIOLOGY & ADDITIONAL TESTS:      ASSESSMENT:  67y Male s/p lumbar wound washout on 6/4 MRSA wound infection    COLITIS  Handoff  MEWS Score  Aortic thromboembolism  Childhood asthma  Lower back pain  PVD (peripheral vascular disease)  Hepatitis B  AAA (abdominal aortic aneurysm)  GERD (gastroesophageal reflux disease)  Prostate cancer  Bladder cancer  Coronary artery disease  PAD (peripheral artery disease)  Hyperlipidemia  Hypertension  Wound infection after surgery  Wound infection after surgery  Wound infection  Wound infection  Washout of wound of spine  H/O laminectomy  Surgery, elective  S/P CABG (coronary artery bypass graft)  H/O total cystectomy  WOUND CHECK  14      PLAN:  -ID recs appreciated. Pt to continue rifampin po and vanco IV until 7/16  -keep hemovac for now due to high output  -PICC line oredered  -ANTHONY for home ABx administration arrangements  -pain management  -D/W     DVT PROPHYLAXIS:  [x] Venodynes                                [] Heparin/Lovenox    DISPOSITION: home with outpatient PT HPI:  HPI: Pt is s/p L4-L5 laminectomy with medial facetectomy and L4-S1 fusion on 5/16 with Dr. Alvarez for lumbar stenosis. He was seen in the office on Tuesday 5/29 for follow up appointment and staples were removed - wound appeared to be healing well at the time. Pt now  presents for low back pain and purulent drainage from wound since yesterday. Pt denies fever, chills, n/v/d, HA or blurry vision. He has no other signs of infection. Reports his LE weakness has improved since pre-op. (01 Jun 2018 18:02)    OVERNIGHT EVENTS:  Pt c/o incisional pain, well controlled with meds. No leg pain. Ambulating.     Vital Signs Last 24 Hrs  T(C): 36.1 (06 Jun 2018 09:20), Max: 37.4 (05 Jun 2018 12:39)  T(F): 97 (06 Jun 2018 09:20), Max: 99.3 (05 Jun 2018 12:39)  HR: 86 (06 Jun 2018 09:20) (74 - 86)  BP: 117/71 (06 Jun 2018 09:20) (117/71 - 121/75)  BP(mean): --  RR: 16 (06 Jun 2018 09:20) (16 - 17)  SpO2: 100% (06 Jun 2018 09:20) (96% - 100%)    I&O's Summary    05 Jun 2018 07:01  -  06 Jun 2018 07:00  --------------------------------------------------------  IN: 1415 mL / OUT: 2230 mL / NET: -815 mL    06 Jun 2018 07:01  -  06 Jun 2018 11:34  --------------------------------------------------------  IN: 370 mL / OUT: 200 mL / NET: 170 mL        PHYSICAL EXAM:  Neurological:  AxOx3  5/5 motor x 4ext   sensory intact  Incision/Wound:C/D/I minor redness bottom of wound. no drainage    TUBES/LINES:  [] Griffin  [] Lumbar Drain  [x] Wound Drains- hemovac 70cc/shift, 180cc/24hr  [] Others      DIET:  [] NPO  [x] Mechanical  [] Tube feeds    LABS:                        8.3    5.4   )-----------( 360      ( 06 Jun 2018 05:50 )             25.9     06-06    143  |  107  |  4<L>  ----------------------------<  96  4.4   |  26  |  0.81    Ca    8.8      06 Jun 2018 05:50  Phos  3.7     06-06  Mg     2.0     06-06              CAPILLARY BLOOD GLUCOSE          Drug Levels: [] N/A  Vancomycin Level, Trough: 20 ug/mL (06-05 @ 13:06)  Vancomycin Level, Trough: 11 ug/mL (06-03 @ 10:53)    CSF Analysis: [] N/A      Allergies    penicillin (Hives)    Intolerances      MEDICATIONS:  Antibiotics:  rifampin 600 milliGRAM(s) Oral daily  vancomycin  IVPB 1500 milliGRAM(s) IV Intermittent every 12 hours    Neuro:  acetaminophen   Tablet 650 milliGRAM(s) Oral every 6 hours PRN  acetaminophen   Tablet. 650 milliGRAM(s) Oral every 6 hours PRN  ALPRAZolam 1 milliGRAM(s) Oral three times a day PRN  HYDROmorphone  Injectable 0.5 milliGRAM(s) IV Push every 1 hour PRN  HYDROmorphone  Injectable 0.5 milliGRAM(s) IV Push every 1 hour PRN  methocarbamol 750 milliGRAM(s) Oral every 8 hours PRN  ondansetron Injectable 4 milliGRAM(s) IV Push every 6 hours PRN  oxyCODONE    5 mG/acetaminophen 325 mG 2 Tablet(s) Oral every 4 hours PRN  oxyCODONE    5 mG/acetaminophen 325 mG 1 Tablet(s) Oral every 4 hours PRN  zolpidem 5 milliGRAM(s) Oral at bedtime PRN    Anticoagulation:  aspirin  chewable 81 milliGRAM(s) Oral daily    OTHER:  ALBUTerol    90 MICROgram(s) HFA Inhaler 2 Puff(s) Inhalation every 6 hours PRN  amLODIPine   Tablet 10 milliGRAM(s) Oral daily  docusate sodium 100 milliGRAM(s) Oral three times a day  fluticasone propionate (50 MICROgram(s)/actuation) Nasal Spray - Peds 1 Spray(s) Alternating Nostrils daily  pantoprazole    Tablet 40 milliGRAM(s) Oral before breakfast  rosuvastatin 20 milliGRAM(s) Oral at bedtime  senna 2 Tablet(s) Oral at bedtime PRN    IVF:  lactated ringers. 1000 milliLiter(s) IV Continuous <Continuous>  sodium chloride 2 Gram(s) Oral every 8 hours    CULTURES:  Culture Results:   No growth at 1 day. (06-04 @ 16:45)  Culture Results:   No growth at 1 day. (06-04 @ 16:45)    Culture - Surgical Swab (06.04.18 @ 13:28)    Gram Stain:   Rare Gram positive cocci in pairs  Numerous White blood cells    -  Cefazolin: R 8    -  Clindamycin: S <=0.5    -  Daptomycin: S 1    -  Erythromycin: R >4    -  Linezolid: S 4    -  Oxacillin: R >2    -  Penicillin: R >8    -  RIF- Rifampin: S <=1    -  Tetra/Doxy: S <=4    -  Trimethoprim/Sulfamethoxazole: S <=0.5/9.5    -  Vancomycin: S 1    -  Vancomycin: S 2    Specimen Source: .Surgical Swab O.R. SAMPLE - LUMBAR WND #2    Culture Results:   Few Methicillin resistant Staphylococcus aureus  Floor previously notified.    Organism Identification: Methicillin resistant Staphylococcus aureus  Methicillin resistant Staphylococcus aureus    Organism: Methicillin resistant Staphylococcus aureus    Organism: Methicillin resistant Staphylococcus aureus    Method Type: ETEST    Method Type: ANAI        RADIOLOGY & ADDITIONAL TESTS:      ASSESSMENT:  67y Male s/p lumbar wound washout on 6/4 MRSA wound infection    COLITIS  Handoff  MEWS Score  Aortic thromboembolism  Childhood asthma  Lower back pain  PVD (peripheral vascular disease)  Hepatitis B  AAA (abdominal aortic aneurysm)  GERD (gastroesophageal reflux disease)  Prostate cancer  Bladder cancer  Coronary artery disease  PAD (peripheral artery disease)  Hyperlipidemia  Hypertension  Wound infection after surgery  Wound infection after surgery  Wound infection  Wound infection  Washout of wound of spine  H/O laminectomy  Surgery, elective  S/P CABG (coronary artery bypass graft)  H/O total cystectomy  WOUND CHECK  14      PLAN:  -ID recs appreciated. Pt to continue rifampin po and vanco IV until 7/16  -keep hemovac for now due to high output  -PICC line oredered  -ANTHONY for home ABx administration arrangements  -pain management  -start lovenox for dvt prophylaxis  -D/W     DVT PROPHYLAXIS:  [x] Venodynes                                [x] Lovenox    DISPOSITION: home with outpatient PT

## 2018-06-06 NOTE — DIETITIAN INITIAL EVALUATION ADULT. - ENERGY NEEDS
Height: 5'8" Weight: 195lbs, IBW 154lbs+/-10%, %%, BMI 29.6  IBW used for calculations as pt >120% of IBW   Nutrient needs based on [facility] standards of care for maintenance in older adults.   Needs adjusted for post-op wound healing and age

## 2018-06-06 NOTE — DIETITIAN INITIAL EVALUATION ADULT. - OTHER INFO
66yo M s/p lumbar wound washout on 6/4 MRSA wound infection. Pt currently on a regular diet and tolerating PO. Reports poor appetite since previous admission however is consuming >50% meals. Denies N/V, last BM was 2 days PTA. RD provided edu on high protein diet for wound healing and high fiber foods for constipation nutrition therapy. No known wt changes. NKFA or dietary restrictions. Skin: surgical incision; GI WDL per flowsheet.

## 2018-06-07 ENCOUNTER — TRANSCRIPTION ENCOUNTER (OUTPATIENT)
Age: 68
End: 2018-06-07

## 2018-06-07 VITALS
DIASTOLIC BLOOD PRESSURE: 76 MMHG | SYSTOLIC BLOOD PRESSURE: 133 MMHG | TEMPERATURE: 99 F | OXYGEN SATURATION: 97 % | RESPIRATION RATE: 17 BRPM | HEART RATE: 78 BPM

## 2018-06-07 LAB
CULTURE RESULTS: SIGNIFICANT CHANGE UP
ORGANISM # SPEC MICROSCOPIC CNT: SIGNIFICANT CHANGE UP
SPECIMEN SOURCE: SIGNIFICANT CHANGE UP
VANCOMYCIN TROUGH SERPL-MCNC: 17 UG/ML — SIGNIFICANT CHANGE UP (ref 10–20)
VANCOMYCIN TROUGH SERPL-MCNC: 18 UG/ML — SIGNIFICANT CHANGE UP (ref 10–20)

## 2018-06-07 PROCEDURE — 36415 COLL VENOUS BLD VENIPUNCTURE: CPT

## 2018-06-07 PROCEDURE — 85730 THROMBOPLASTIN TIME PARTIAL: CPT

## 2018-06-07 PROCEDURE — 87641 MR-STAPH DNA AMP PROBE: CPT

## 2018-06-07 PROCEDURE — 86850 RBC ANTIBODY SCREEN: CPT

## 2018-06-07 PROCEDURE — 85610 PROTHROMBIN TIME: CPT

## 2018-06-07 PROCEDURE — 85652 RBC SED RATE AUTOMATED: CPT

## 2018-06-07 PROCEDURE — 87040 BLOOD CULTURE FOR BACTERIA: CPT

## 2018-06-07 PROCEDURE — 72148 MRI LUMBAR SPINE W/O DYE: CPT

## 2018-06-07 PROCEDURE — C1751: CPT

## 2018-06-07 PROCEDURE — 87186 SC STD MICRODIL/AGAR DIL: CPT

## 2018-06-07 PROCEDURE — 71045 X-RAY EXAM CHEST 1 VIEW: CPT

## 2018-06-07 PROCEDURE — 93306 TTE W/DOPPLER COMPLETE: CPT

## 2018-06-07 PROCEDURE — 87075 CULTR BACTERIA EXCEPT BLOOD: CPT

## 2018-06-07 PROCEDURE — 86901 BLOOD TYPING SEROLOGIC RH(D): CPT

## 2018-06-07 PROCEDURE — 97161 PT EVAL LOW COMPLEX 20 MIN: CPT

## 2018-06-07 PROCEDURE — 36569 INSJ PICC 5 YR+ W/O IMAGING: CPT

## 2018-06-07 PROCEDURE — C1889: CPT

## 2018-06-07 PROCEDURE — 84100 ASSAY OF PHOSPHORUS: CPT

## 2018-06-07 PROCEDURE — 85027 COMPLETE CBC AUTOMATED: CPT

## 2018-06-07 PROCEDURE — 94640 AIRWAY INHALATION TREATMENT: CPT

## 2018-06-07 PROCEDURE — 87070 CULTURE OTHR SPECIMN AEROBIC: CPT

## 2018-06-07 PROCEDURE — 93005 ELECTROCARDIOGRAM TRACING: CPT

## 2018-06-07 PROCEDURE — 83735 ASSAY OF MAGNESIUM: CPT

## 2018-06-07 PROCEDURE — 76937 US GUIDE VASCULAR ACCESS: CPT

## 2018-06-07 PROCEDURE — 80053 COMPREHEN METABOLIC PANEL: CPT

## 2018-06-07 PROCEDURE — 76937 US GUIDE VASCULAR ACCESS: CPT | Mod: 26

## 2018-06-07 PROCEDURE — 85025 COMPLETE CBC W/AUTO DIFF WBC: CPT

## 2018-06-07 PROCEDURE — 80202 ASSAY OF VANCOMYCIN: CPT

## 2018-06-07 PROCEDURE — 87150 DNA/RNA AMPLIFIED PROBE: CPT

## 2018-06-07 PROCEDURE — 86900 BLOOD TYPING SEROLOGIC ABO: CPT

## 2018-06-07 PROCEDURE — 86140 C-REACTIVE PROTEIN: CPT

## 2018-06-07 PROCEDURE — 99285 EMERGENCY DEPT VISIT HI MDM: CPT

## 2018-06-07 PROCEDURE — 80048 BASIC METABOLIC PNL TOTAL CA: CPT

## 2018-06-07 RX ORDER — VANCOMYCIN HCL 1 G
1500 VIAL (EA) INTRAVENOUS
Qty: 0 | Refills: 0 | DISCHARGE
Start: 2018-06-07

## 2018-06-07 RX ORDER — VANCOMYCIN HCL 1 G
1500 VIAL (EA) INTRAVENOUS ONCE
Qty: 0 | Refills: 0 | Status: DISCONTINUED | OUTPATIENT
Start: 2018-06-07 | End: 2018-06-07

## 2018-06-07 RX ORDER — SODIUM CHLORIDE 9 MG/ML
10 INJECTION INTRAMUSCULAR; INTRAVENOUS; SUBCUTANEOUS
Qty: 0 | Refills: 0 | Status: DISCONTINUED | OUTPATIENT
Start: 2018-06-07 | End: 2018-06-07

## 2018-06-07 RX ORDER — ACETAMINOPHEN 500 MG
2 TABLET ORAL
Qty: 0 | Refills: 0 | DISCHARGE
Start: 2018-06-07

## 2018-06-07 RX ORDER — SODIUM CHLORIDE 9 MG/ML
20 INJECTION INTRAMUSCULAR; INTRAVENOUS; SUBCUTANEOUS ONCE
Qty: 0 | Refills: 0 | Status: DISCONTINUED | OUTPATIENT
Start: 2018-06-07 | End: 2018-06-07

## 2018-06-07 RX ORDER — SODIUM CHLORIDE 9 MG/ML
10 INJECTION INTRAMUSCULAR; INTRAVENOUS; SUBCUTANEOUS EVERY 12 HOURS
Qty: 0 | Refills: 0 | Status: DISCONTINUED | OUTPATIENT
Start: 2018-06-07 | End: 2018-06-07

## 2018-06-07 RX ADMIN — OXYCODONE AND ACETAMINOPHEN 1 TABLET(S): 5; 325 TABLET ORAL at 11:18

## 2018-06-07 RX ADMIN — OXYCODONE AND ACETAMINOPHEN 1 TABLET(S): 5; 325 TABLET ORAL at 12:54

## 2018-06-07 RX ADMIN — Medication 1 SPRAY(S): at 11:10

## 2018-06-07 RX ADMIN — Medication 100 MILLIGRAM(S): at 05:43

## 2018-06-07 RX ADMIN — Medication 300 MILLIGRAM(S): at 05:42

## 2018-06-07 RX ADMIN — PANTOPRAZOLE SODIUM 40 MILLIGRAM(S): 20 TABLET, DELAYED RELEASE ORAL at 05:43

## 2018-06-07 RX ADMIN — OXYCODONE AND ACETAMINOPHEN 2 TABLET(S): 5; 325 TABLET ORAL at 07:36

## 2018-06-07 RX ADMIN — OXYCODONE AND ACETAMINOPHEN 2 TABLET(S): 5; 325 TABLET ORAL at 08:42

## 2018-06-07 RX ADMIN — SODIUM CHLORIDE 2 GRAM(S): 9 INJECTION INTRAMUSCULAR; INTRAVENOUS; SUBCUTANEOUS at 05:43

## 2018-06-07 RX ADMIN — Medication 81 MILLIGRAM(S): at 11:12

## 2018-06-07 RX ADMIN — AMLODIPINE BESYLATE 10 MILLIGRAM(S): 2.5 TABLET ORAL at 05:43

## 2018-06-07 NOTE — DISCHARGE NOTE ADULT - CARE PROVIDER_API CALL
Patricio Alvarez), Neurological Surgery  [address], [place] [facility]  Phone: [phone]  Fax: [phone]

## 2018-06-07 NOTE — DISCHARGE NOTE ADULT - HOSPITAL COURSE
HPI: Pt is s/p L4-L5 laminectomy with medial facetectomy and L4-S1 fusion on 5/16 with Dr. Alvarez for lumbar stenosis. He was seen in the office on Tuesday 5/29 for follow up appointment and staples were removed - wound appeared to be healing well at the time. Pt now  presents for low back pain and purulent drainage from wound since yesterday. Pt denies fever, chills, n/v/d, HA or blurry vision. He has no other signs of infection. Reports his LE weakness has improved since pre-op.   Allergy PCN    Past Medical History:  AAA (abdominal aortic aneurysm)    Aortic thromboembolism    Bladder cancer    Childhood asthma    Coronary artery disease    GERD (gastroesophageal reflux disease)    Hepatitis B    Hyperlipidemia    Hypertension    Lower back pain    PAD (peripheral artery disease)    Prostate cancer  Radiation [place]  PVD (peripheral vascular disease).    Past Surgical History:  H/O laminectomy  x 4 yrs  S/P CABG (coronary artery bypass graft)  2003  Surgery, elective  Peripheral stents in both legs 2 -left, 1- right.    Pt underwebnt on 6/4/2018 Washout of wound spine with hemovac which one was removed 6/5 and the second hemovac removed 6/7 without complications  Discharge to home with IV antibx for six weeks ending date 7/16/18 +MRSA wound cultures

## 2018-06-07 NOTE — DISCHARGE NOTE ADULT - CARE PLAN
Principal Discharge DX:	Wound infection  Goal:	return home with IV antibxx to complete the course and return to you normal activity  Assessment and plan of treatment:	Diet: Regular  Activity as tolerated  No heavy lifting anything greater than 10 pounds  No bending or twisting  IV antibx as per ID  Weekly Blood work as per the VISSITING nURSE sERVICES  aNY DRAINAGE FROM THE WOUND, TEMPERATURE GREATER THAN 1015 DEGREES f CALL THE OFFICE  CALL THE OFFICE WHEN TO FOLLOW UP TO HAVE YOUR SUTURES REMOVED  able to shower and get the incision wet, pat it dry with a clean towel  Also call Infectious Disease doctor to make a 2 week follow up call the offfice at 212 Principal Discharge DX:	Wound infection  Goal:	return home with IV antibx to complete the course and return to you normal activity  Assessment and plan of treatment:	Diet: Regular  Activity as tolerated  No heavy lifting anything greater than 10 pounds  No bending or twisting  IV antibx as per ID  Weekly Blood work as per the Visiting Nurse Services  aNY DRAINAGE FROM THE WOUND, TEMPERATURE GREATER THAN 1015 DEGREES f CALL THE OFFICE  CALL THE OFFICE WHEN TO FOLLOW UP TO HAVE YOUR SUTURES REMOVED  able to shower and get the incision wet, pat it dry with a clean towel  Also call Infectious Disease doctor to make a 2 week follow up call the offfice   Dr Alvarez wants to see you next week   call the office for an appt

## 2018-06-07 NOTE — PROCEDURE NOTE - NSPOSTCAREGUIDE_GEN_A_CORE
Verbal/written post procedure instructions were given to patient/caregiver
Care for catheter as per unit/ICU protocols/Verbal/written post procedure instructions were given to patient/caregiver/Keep the cast/splint/dressing clean and dry

## 2018-06-07 NOTE — DISCHARGE NOTE ADULT - PLAN OF CARE
return home with IV antibxx to complete the course and return to you normal activity Diet: Regular  Activity as tolerated  No heavy lifting anything greater than 10 pounds  No bending or twisting  IV antibx as per ID  Weekly Blood work as per the VISSITING nURSE sERVICES  aNY DRAINAGE FROM THE WOUND, TEMPERATURE GREATER THAN 1015 DEGREES f CALL THE OFFICE  CALL THE OFFICE WHEN TO FOLLOW UP TO HAVE YOUR SUTURES REMOVED  able to shower and get the incision wet, pat it dry with a clean towel  Also call Infectious Disease doctor to make a 2 week follow up call the offfice at 212 return home with IV antibx to complete the course and return to you normal activity Diet: Regular  Activity as tolerated  No heavy lifting anything greater than 10 pounds  No bending or twisting  IV antibx as per ID  Weekly Blood work as per the Visiting Nurse Services  aNY DRAINAGE FROM THE WOUND, TEMPERATURE GREATER THAN 1015 DEGREES f CALL THE OFFICE  CALL THE OFFICE WHEN TO FOLLOW UP TO HAVE YOUR SUTURES REMOVED  able to shower and get the incision wet, pat it dry with a clean towel  Also call Infectious Disease doctor to make a 2 week follow up call the offfice   Dr Alvarez wants to see you next week   call the office for an appt

## 2018-06-07 NOTE — PROCEDURE NOTE - NSPROCDETAILS_GEN_ALL_CORE
sterile dressing applied/location identified, draped/prepped, sterile technique used/supine position/ultrasound guidance/ultrasound assessment/sterile technique, catheter placed

## 2018-06-07 NOTE — DISCHARGE NOTE ADULT - PATIENT PORTAL LINK FT
You can access the [facility] Patient Portal, offered by [facility], by registering with the following website: [email or web address]

## 2018-06-07 NOTE — PROGRESS NOTE ADULT - SUBJECTIVE AND OBJECTIVE BOX
HPI:  HPI: Pt is s/p L4-L5 laminectomy with medial facetectomy and L4-S1 fusion on 5/16 with Dr. Alvarez for lumbar stenosis. He was seen in the office on Tuesday 5/29 for follow up appointment and staples were removed - wound appeared to be healing well at the time. Pt now  presents for low back pain and purulent drainage from wound since yesterday. Pt denies fever, chills, n/v/d, HA or blurry vision. He has no other signs of infection. Reports his LE weakness has improved since pre-op. (01 Jun 2018 18:02)    OVERNIGHT EVENTS:  Vital Signs Last 24 Hrs  T(C): 37.3 (07 Jun 2018 08:22), Max: 37.3 (07 Jun 2018 08:22)  T(F): 99.1 (07 Jun 2018 08:22), Max: 99.1 (07 Jun 2018 08:22)  HR: 78 (07 Jun 2018 08:22) (70 - 85)  BP: 133/76 (07 Jun 2018 08:22) (100/59 - 133/76)  BP(mean): --  RR: 17 (07 Jun 2018 08:22) (16 - 17)  SpO2: 97% (07 Jun 2018 08:22) (97% - 98%)    I&O's Summary    06 Jun 2018 07:01  -  07 Jun 2018 07:00  --------------------------------------------------------  IN: 630 mL / OUT: 670 mL / NET: -40 mL    07 Jun 2018 07:01  -  07 Jun 2018 10:38  --------------------------------------------------------  IN: 280 mL / OUT: 0 mL / NET: 280 mL        PHYSICAL EXAM:  Neurological:    A&OX3 Cranial nerves intact  BRIAN  Lami incision CDI with one hemovac      Cardiovascular: RRR  Respiratory: Lungs CTAB  Gastrointestinal: +BS  Genitourinary: Voiding without difficulty  Extremities: warm and dry    DIET: Regular  [  LABS:                        8.3    5.4   )-----------( 360      ( 06 Jun 2018 05:50 )             25.9     06-06    143  |  107  |  4<L>  ----------------------------<  96  4.4   |  26  |  0.81    Ca    8.8      06 Jun 2018 05:50  Phos  3.7     06-06  Mg     2.0     06-06        CAPILLARY BLOOD GLUCOSE      Drug Levels: [] N/A  Vancomycin Level, Trough: 17 ug/mL (06-07 @ 05:50)  Vancomycin Level, Trough: 18 ug/mL (06-07 @ 02:30)  Vancomycin Level, Trough: 20 ug/mL (06-05 @ 13:06)    CSF Analysis: [] N/A      Allergies    penicillin (Hives)    Intolerances      MEDICATIONS:  Antibiotics:  rifampin 600 milliGRAM(s) Oral daily  vancomycin  IVPB 1500 milliGRAM(s) IV Intermittent every 12 hours    Neuro:  acetaminophen   Tablet 650 milliGRAM(s) Oral every 6 hours PRN  acetaminophen   Tablet. 650 milliGRAM(s) Oral every 6 hours PRN  ALPRAZolam 1 milliGRAM(s) Oral three times a day PRN  HYDROmorphone  Injectable 0.5 milliGRAM(s) IV Push every 1 hour PRN  HYDROmorphone  Injectable 0.5 milliGRAM(s) IV Push every 1 hour PRN  methocarbamol 750 milliGRAM(s) Oral every 8 hours PRN  ondansetron Injectable 4 milliGRAM(s) IV Push every 6 hours PRN  oxyCODONE    5 mG/acetaminophen 325 mG 2 Tablet(s) Oral every 4 hours PRN  oxyCODONE    5 mG/acetaminophen 325 mG 1 Tablet(s) Oral every 4 hours PRN  zolpidem 5 milliGRAM(s) Oral at bedtime PRN    Anticoagulation:  aspirin  chewable 81 milliGRAM(s) Oral daily  enoxaparin Injectable 40 milliGRAM(s) SubCutaneous at bedtime    OTHER:  ALBUTerol    90 MICROgram(s) HFA Inhaler 2 Puff(s) Inhalation every 6 hours PRN  amLODIPine   Tablet 10 milliGRAM(s) Oral daily  docusate sodium 100 milliGRAM(s) Oral three times a day  fluticasone propionate (50 MICROgram(s)/actuation) Nasal Spray - Peds 1 Spray(s) Alternating Nostrils daily  pantoprazole    Tablet 40 milliGRAM(s) Oral before breakfast  rosuvastatin 20 milliGRAM(s) Oral at bedtime  senna 2 Tablet(s) Oral at bedtime PRN    IVF:  lactated ringers. 1000 milliLiter(s) IV Continuous <Continuous>  sodium chloride 2 Gram(s) Oral every 8 hours    CULTURES:  Culture Results:   No growth at 2 days. (06-04 @ 16:45)  Culture Results:   No growth at 2 days. (06-04 @ 16:45)    RADIOLOGY & ADDITIONAL TESTS:      ASSESSMENT:  67y Male s/p  lumbar wound washout on 6/4 MRSA wound infection    PLAN:    NEURO:    Monitor neuro status  OT/PT  Pain Managment  Bowel Regime  PICC line  IV antibx X6 weeks as per ID  Continue to monitor hemovac output  Continue current medical regime    Dispo: Discuss with attending

## 2018-06-07 NOTE — DISCHARGE NOTE ADULT - NS AS ACTIVITY OBS
Showering allowed/Driving allowed/No Heavy lifting/straining/Walking-Outdoors allowed/Stairs allowed/Do not make important decisions/Do not drive or operate machinery

## 2018-06-07 NOTE — DISCHARGE NOTE ADULT - MEDICATION SUMMARY - MEDICATIONS TO TAKE
I will START or STAY ON the medications listed below when I get home from the hospital:    Viagra 50 mg oral tablet  -- 1 tab(s) by mouth once a day, As Needed  -- Indication: For impotence    Aspirin Enteric Coated 81 mg oral delayed release tablet  -- 1 tab(s) by mouth once a day  -- Start this on 6/23  -- Indication: For anti platlet    acetaminophen 325 mg oral tablet  -- 2 tab(s) by mouth every 6 hours, As needed, For Temp greater than 38 C (100.4 F)  -- Indication: For pain    acetaminophen 325 mg oral tablet  -- 2 tab(s) by mouth every 6 hours, As needed, Mild Pain (1 - 3)  -- Indication: For pain    oxyCODONE-acetaminophen 5 mg-325 mg oral tablet  -- 1-2 tab(s) by mouth every 4 hours, As needed, 1 tab for Mod Pain (4-6), 2 tabs for Severe Pain (7 - 10) MDD:12 tabs  -- Indication: For pain    atorvastatin 40 mg oral tablet  -- 1 tab(s) by mouth once a day (at bedtime)  -- Indication: For Cholesterol    Exforge 10 mg-320 mg oral tablet  -- 1 tab(s) by mouth once a day  -- Indication: For Cv agent    rifAMPin 300 mg oral capsule  -- 2 cap(s) by mouth once a day  -- Indication: For antibx    ALPRAZolam 1 mg oral tablet  -- 1 tab(s) by mouth 3 times a day, As Needed  -- Indication: For anxiety    albuterol 90 mcg/inh inhalation aerosol  -- 2 puff(s) inhaled every 8 hours, As Needed  -- Indication: For inhalant    amLODIPine 10 mg oral tablet  -- 1 tab(s) by mouth once a day  -- Indication: For blood pressure    vancomycin 750 mg intravenous injection  -- 1500 milligram(s) intravenous once  -- Indication: For antibx    docusate sodium 100 mg oral capsule  -- 1 cap(s) by mouth 3 times a day  -- Indication: For stool softner    senna oral tablet  -- 2 tab(s) by mouth once a day (at bedtime)  -- Indication: For fiber pill    methocarbamol 750 mg oral tablet  -- 1 tab(s) by mouth every 8 hours, As needed, Spasms/Stiffness  -- Indication: For Cns agent    Flonase 50 mcg/inh nasal spray  -- 1 spray(s) into nose once a day  -- Indication: For topical agent    NexIUM 40 mg oral delayed release capsule  -- 1 cap(s) by mouth once a day  -- Indication: For antiacid

## 2018-06-09 LAB
CULTURE RESULTS: SIGNIFICANT CHANGE UP
CULTURE RESULTS: SIGNIFICANT CHANGE UP
SPECIMEN SOURCE: SIGNIFICANT CHANGE UP
SPECIMEN SOURCE: SIGNIFICANT CHANGE UP

## 2018-06-12 DIAGNOSIS — Z85.51 PERSONAL HISTORY OF MALIGNANT NEOPLASM OF BLADDER: ICD-10-CM

## 2018-06-12 DIAGNOSIS — B95.62 METHICILLIN RESISTANT STAPHYLOCOCCUS AUREUS INFECTION AS THE CAUSE OF DISEASES CLASSIFIED ELSEWHERE: ICD-10-CM

## 2018-06-12 DIAGNOSIS — B19.10 UNSPECIFIED VIRAL HEPATITIS B WITHOUT HEPATIC COMA: ICD-10-CM

## 2018-06-12 DIAGNOSIS — Z79.82 LONG TERM (CURRENT) USE OF ASPIRIN: ICD-10-CM

## 2018-06-12 DIAGNOSIS — G06.1 INTRASPINAL ABSCESS AND GRANULOMA: ICD-10-CM

## 2018-06-12 DIAGNOSIS — K21.9 GASTRO-ESOPHAGEAL REFLUX DISEASE WITHOUT ESOPHAGITIS: ICD-10-CM

## 2018-06-12 DIAGNOSIS — Z85.46 PERSONAL HISTORY OF MALIGNANT NEOPLASM OF PROSTATE: ICD-10-CM

## 2018-06-12 DIAGNOSIS — T81.4XXA INFECTION FOLLOWING A PROCEDURE, INITIAL ENCOUNTER: ICD-10-CM
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
## 2018-06-12 DIAGNOSIS — I10 ESSENTIAL (PRIMARY) HYPERTENSION: ICD-10-CM

## 2018-06-12 DIAGNOSIS — Z90.6 ACQUIRED ABSENCE OF OTHER PARTS OF URINARY TRACT: ICD-10-CM

## 2018-06-12 DIAGNOSIS — Z88.0 ALLERGY STATUS TO PENICILLIN: ICD-10-CM
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
## 2018-06-12 DIAGNOSIS — I71.4 ABDOMINAL AORTIC ANEURYSM, WITHOUT RUPTURE: ICD-10-CM

## 2018-06-12 DIAGNOSIS — I73.9 PERIPHERAL VASCULAR DISEASE, UNSPECIFIED: ICD-10-CM

## 2018-06-12 DIAGNOSIS — E78.5 HYPERLIPIDEMIA, UNSPECIFIED: ICD-10-CM

## 2018-06-12 DIAGNOSIS — Z92.3 PERSONAL HISTORY OF IRRADIATION: ICD-10-CM

## 2018-06-12 DIAGNOSIS — I25.10 ATHEROSCLEROTIC HEART DISEASE OF NATIVE CORONARY ARTERY WITHOUT ANGINA PECTORIS: ICD-10-CM

## 2018-06-12 DIAGNOSIS — M48.061 SPINAL STENOSIS, LUMBAR REGION WITHOUT NEUROGENIC CLAUDICATION: ICD-10-CM

## 2018-06-14 ENCOUNTER — APPOINTMENT (OUTPATIENT)
Dept: NEUROSURGERY | Facility: CLINIC | Age: 68
End: 2018-06-14
Payer: MEDICARE

## 2018-06-14 ENCOUNTER — RX RENEWAL (OUTPATIENT)
Age: 68
End: 2018-06-14

## 2018-06-14 VITALS
HEART RATE: 88 BPM | HEIGHT: 68 IN | WEIGHT: 190 LBS | RESPIRATION RATE: 16 BRPM | DIASTOLIC BLOOD PRESSURE: 70 MMHG | OXYGEN SATURATION: 99 % | BODY MASS INDEX: 28.79 KG/M2 | TEMPERATURE: 98.1 F | SYSTOLIC BLOOD PRESSURE: 119 MMHG

## 2018-06-14 PROCEDURE — 99024 POSTOP FOLLOW-UP VISIT: CPT

## 2018-06-21 ENCOUNTER — APPOINTMENT (OUTPATIENT)
Dept: INFECTIOUS DISEASE | Facility: CLINIC | Age: 68
End: 2018-06-21
Payer: MEDICARE

## 2018-06-21 VITALS
RESPIRATION RATE: 14 BRPM | HEIGHT: 66 IN | BODY MASS INDEX: 29.89 KG/M2 | TEMPERATURE: 98.3 F | HEART RATE: 82 BPM | SYSTOLIC BLOOD PRESSURE: 155 MMHG | DIASTOLIC BLOOD PRESSURE: 72 MMHG | OXYGEN SATURATION: 99 % | WEIGHT: 186 LBS

## 2018-06-21 PROCEDURE — 99204 OFFICE O/P NEW MOD 45 MIN: CPT

## 2018-06-21 RX ORDER — SULFAMETHOXAZOLE AND TRIMETHOPRIM 800; 160 MG/1; MG/1
800-160 TABLET ORAL
Qty: 20 | Refills: 0 | Status: DISCONTINUED | COMMUNITY
Start: 2018-05-29 | End: 2018-06-21

## 2018-06-21 RX ORDER — RIFAMPIN 300 MG/1
300 CAPSULE ORAL DAILY
Qty: 120 | Refills: 1 | Status: DISCONTINUED | COMMUNITY
Start: 2018-06-14 | End: 2018-06-21

## 2018-06-28 ENCOUNTER — APPOINTMENT (OUTPATIENT)
Dept: NEUROSURGERY | Facility: CLINIC | Age: 68
End: 2018-06-28
Payer: MEDICARE

## 2018-06-28 VITALS
BODY MASS INDEX: 30.22 KG/M2 | WEIGHT: 188 LBS | RESPIRATION RATE: 18 BRPM | SYSTOLIC BLOOD PRESSURE: 134 MMHG | HEIGHT: 66 IN | TEMPERATURE: 98.3 F | HEART RATE: 83 BPM | OXYGEN SATURATION: 100 % | DIASTOLIC BLOOD PRESSURE: 71 MMHG

## 2018-06-28 PROCEDURE — 99024 POSTOP FOLLOW-UP VISIT: CPT

## 2018-06-29 ENCOUNTER — CLINICAL ADVICE (OUTPATIENT)
Age: 68
End: 2018-06-29

## 2018-07-09 ENCOUNTER — APPOINTMENT (OUTPATIENT)
Dept: INFECTIOUS DISEASE | Facility: CLINIC | Age: 68
End: 2018-07-09
Payer: MEDICARE

## 2018-07-09 VITALS
BODY MASS INDEX: 29.73 KG/M2 | RESPIRATION RATE: 16 BRPM | OXYGEN SATURATION: 100 % | HEIGHT: 66 IN | TEMPERATURE: 98.6 F | SYSTOLIC BLOOD PRESSURE: 152 MMHG | HEART RATE: 77 BPM | WEIGHT: 185 LBS | DIASTOLIC BLOOD PRESSURE: 79 MMHG

## 2018-07-09 PROCEDURE — 99214 OFFICE O/P EST MOD 30 MIN: CPT

## 2018-07-09 RX ORDER — ROSUVASTATIN CALCIUM 20 MG/1
20 TABLET, FILM COATED ORAL
Refills: 0 | Status: COMPLETED | COMMUNITY
End: 2018-07-09

## 2018-07-18 ENCOUNTER — MEDICATION RENEWAL (OUTPATIENT)
Age: 68
End: 2018-07-18

## 2018-07-30 ENCOUNTER — LABORATORY RESULT (OUTPATIENT)
Age: 68
End: 2018-07-30

## 2018-07-30 ENCOUNTER — APPOINTMENT (OUTPATIENT)
Dept: HEART AND VASCULAR | Facility: CLINIC | Age: 68
End: 2018-07-30
Payer: MEDICARE

## 2018-07-30 VITALS
HEART RATE: 72 BPM | BODY MASS INDEX: 29.73 KG/M2 | RESPIRATION RATE: 16 BRPM | SYSTOLIC BLOOD PRESSURE: 126 MMHG | DIASTOLIC BLOOD PRESSURE: 80 MMHG | WEIGHT: 185 LBS | HEIGHT: 66 IN

## 2018-07-30 DIAGNOSIS — J30.9 ALLERGIC RHINITIS, UNSPECIFIED: ICD-10-CM

## 2018-07-30 DIAGNOSIS — Z09 ENCOUNTER FOR FOLLOW-UP EXAMINATION AFTER COMPLETED TREATMENT FOR CONDITIONS OTHER THAN MALIGNANT NEOPLASM: ICD-10-CM

## 2018-07-30 DIAGNOSIS — Z86.14 PERSONAL HISTORY OF METHICILLIN RESISTANT STAPHYLOCOCCUS AUREUS INFECTION: ICD-10-CM

## 2018-07-30 DIAGNOSIS — F41.9 ANXIETY DISORDER, UNSPECIFIED: ICD-10-CM

## 2018-07-30 DIAGNOSIS — K21.9 GASTRO-ESOPHAGEAL REFLUX DISEASE W/OUT ESOPHAGITIS: ICD-10-CM

## 2018-07-30 DIAGNOSIS — G06.2 EXTRADURAL AND SUBDURAL ABSCESS, UNSPECIFIED: ICD-10-CM

## 2018-07-30 DIAGNOSIS — T14.8XXA OTHER INJURY OF UNSPECIFIED BODY REGION, INITIAL ENCOUNTER: ICD-10-CM

## 2018-07-30 DIAGNOSIS — Z98.890 OTHER SPECIFIED POSTPROCEDURAL STATES: ICD-10-CM

## 2018-07-30 DIAGNOSIS — R21 RASH AND OTHER NONSPECIFIC SKIN ERUPTION: ICD-10-CM

## 2018-07-30 PROCEDURE — 36415 COLL VENOUS BLD VENIPUNCTURE: CPT

## 2018-07-30 PROCEDURE — 93000 ELECTROCARDIOGRAM COMPLETE: CPT

## 2018-07-30 PROCEDURE — 99214 OFFICE O/P EST MOD 30 MIN: CPT | Mod: 25

## 2018-07-30 RX ORDER — METHYLPREDNISOLONE 4 MG/1
4 TABLET ORAL
Qty: 1 | Refills: 0 | Status: DISCONTINUED | COMMUNITY
Start: 2018-05-29 | End: 2018-07-30

## 2018-07-30 RX ORDER — VANCOMYCIN HYDROCHLORIDE 1 G/200ML
INJECTION, SOLUTION INTRAVENOUS
Refills: 0 | Status: DISCONTINUED | COMMUNITY
End: 2018-07-30

## 2018-07-30 RX ORDER — METHYLPREDNISOLONE 4 MG/1
4 TABLET ORAL
Refills: 0 | Status: DISCONTINUED | COMMUNITY
End: 2018-07-30

## 2018-07-31 ENCOUNTER — RX RENEWAL (OUTPATIENT)
Age: 68
End: 2018-07-31

## 2018-07-31 ENCOUNTER — MEDICATION RENEWAL (OUTPATIENT)
Age: 68
End: 2018-07-31

## 2018-07-31 LAB
25(OH)D3 SERPL-MCNC: 25.7 NG/ML
BASOPHILS # BLD AUTO: 0.03 K/UL
BASOPHILS NFR BLD AUTO: 0.6 %
EOSINOPHIL # BLD AUTO: 0.2 K/UL
EOSINOPHIL NFR BLD AUTO: 3.7 %
HCT VFR BLD CALC: 30.3 %
HGB BLD-MCNC: 8.9 G/DL
IMM GRANULOCYTES NFR BLD AUTO: 0.2 %
LYMPHOCYTES # BLD AUTO: 2.04 K/UL
LYMPHOCYTES NFR BLD AUTO: 38 %
MAN DIFF?: NORMAL
MCHC RBC-ENTMCNC: 25.4 PG
MCHC RBC-ENTMCNC: 29.4 GM/DL
MCV RBC AUTO: 86.6 FL
MONOCYTES # BLD AUTO: 0.59 K/UL
MONOCYTES NFR BLD AUTO: 11 %
NEUTROPHILS # BLD AUTO: 2.5 K/UL
NEUTROPHILS NFR BLD AUTO: 46.5 %
PLATELET # BLD AUTO: 272 K/UL
PSA FREE FLD-MCNC: 9.1
PSA FREE SERPL-MCNC: 0.02 NG/ML
PSA SERPL-MCNC: 0.22 NG/ML
RBC # BLD: 3.5 M/UL
RBC # FLD: 16.9 %
T3 SERPL-MCNC: 104 NG/DL
T3FREE SERPL-MCNC: 2.61 PG/ML
T3RU NFR SERPL: 1.09 INDEX
T4 FREE SERPL-MCNC: 0.9 NG/DL
T4 SERPL-MCNC: 5.9 UG/DL
TSH SERPL-ACNC: 1.7 UIU/ML
WBC # FLD AUTO: 5.37 K/UL

## 2018-08-01 LAB
ALBUMIN SERPL ELPH-MCNC: 4.2 G/DL
ALP BLD-CCNC: 80 U/L
ALT SERPL-CCNC: 8 U/L
ANION GAP SERPL CALC-SCNC: 16 MMOL/L
AST SERPL-CCNC: 13 U/L
BILIRUB SERPL-MCNC: 0.3 MG/DL
BUN SERPL-MCNC: 17 MG/DL
CALCIUM SERPL-MCNC: 9.4 MG/DL
CHLORIDE SERPL-SCNC: 101 MMOL/L
CHOLEST SERPL-MCNC: 138 MG/DL
CHOLEST/HDLC SERPL: 3 RATIO
CO2 SERPL-SCNC: 20 MMOL/L
CREAT SERPL-MCNC: 0.96 MG/DL
GLUCOSE SERPL-MCNC: 91 MG/DL
HBA1C MFR BLD HPLC: 5.6 %
HDLC SERPL-MCNC: 46 MG/DL
LDLC SERPL CALC-MCNC: 74 MG/DL
POTASSIUM SERPL-SCNC: 5 MMOL/L
PROT SERPL-MCNC: 7.6 G/DL
SODIUM SERPL-SCNC: 137 MMOL/L
TRIGL SERPL-MCNC: 91 MG/DL

## 2018-08-02 ENCOUNTER — MESSAGE (OUTPATIENT)
Age: 68
End: 2018-08-02

## 2018-08-02 ENCOUNTER — APPOINTMENT (OUTPATIENT)
Dept: NEUROSURGERY | Facility: CLINIC | Age: 68
End: 2018-08-02
Payer: MEDICARE

## 2018-08-02 VITALS — BODY MASS INDEX: 31.16 KG/M2 | WEIGHT: 187 LBS

## 2018-08-02 VITALS
WEIGHT: 166 LBS | BODY MASS INDEX: 27.66 KG/M2 | OXYGEN SATURATION: 98 % | DIASTOLIC BLOOD PRESSURE: 75 MMHG | TEMPERATURE: 98.5 F | RESPIRATION RATE: 18 BRPM | HEART RATE: 80 BPM | SYSTOLIC BLOOD PRESSURE: 125 MMHG | HEIGHT: 64.96 IN

## 2018-08-02 DIAGNOSIS — M48.062 SPINAL STENOSIS, LUMBAR REGION WITH NEUROGENIC CLAUDICATION: ICD-10-CM

## 2018-08-02 PROCEDURE — 99024 POSTOP FOLLOW-UP VISIT: CPT

## 2018-08-07 ENCOUNTER — RX RENEWAL (OUTPATIENT)
Age: 68
End: 2018-08-07

## 2018-08-07 PROBLEM — M48.062 LUMBAR STENOSIS WITH NEUROGENIC CLAUDICATION: Status: ACTIVE | Noted: 2017-06-06

## 2018-08-09 ENCOUNTER — RX RENEWAL (OUTPATIENT)
Age: 68
End: 2018-08-09

## 2018-08-10 ENCOUNTER — RESULT CHARGE (OUTPATIENT)
Age: 68
End: 2018-08-10

## 2018-08-10 PROBLEM — Z98.890 S/P LUMBAR LAMINECTOMY: Status: RESOLVED | Noted: 2017-07-06 | Resolved: 2018-08-10

## 2018-08-10 PROBLEM — G06.2 SUBDURAL ABSCESS: Status: RESOLVED | Noted: 2018-06-21 | Resolved: 2018-08-10

## 2018-08-10 PROBLEM — Z09 POSTOP CHECK: Status: RESOLVED | Noted: 2017-07-21 | Resolved: 2018-08-10

## 2018-08-10 PROBLEM — Z86.14 HISTORY OF METHICILLIN RESISTANT STAPHYLOCOCCUS AUREUS INFECTION: Status: RESOLVED | Noted: 2018-06-14 | Resolved: 2018-08-10

## 2018-08-10 PROBLEM — R21 RASH, SKIN: Status: RESOLVED | Noted: 2018-05-29 | Resolved: 2018-08-10

## 2018-08-10 RX ORDER — OXYCODONE AND ACETAMINOPHEN 5; 325 MG/1; MG/1
5-325 TABLET ORAL
Qty: 14 | Refills: 0 | Status: COMPLETED | COMMUNITY
Start: 2017-07-06 | End: 2018-07-30

## 2018-08-10 RX ORDER — METHOCARBAMOL 750 MG/1
750 TABLET, FILM COATED ORAL
Qty: 30 | Refills: 0 | Status: DISCONTINUED | COMMUNITY
Start: 2018-05-23 | End: 2018-07-30

## 2018-08-16 ENCOUNTER — APPOINTMENT (OUTPATIENT)
Dept: INFECTIOUS DISEASE | Facility: CLINIC | Age: 68
End: 2018-08-16
Payer: MEDICARE

## 2018-08-16 ENCOUNTER — APPOINTMENT (OUTPATIENT)
Dept: VASCULAR SURGERY | Facility: CLINIC | Age: 68
End: 2018-08-16
Payer: MEDICARE

## 2018-08-16 VITALS
WEIGHT: 184 LBS | SYSTOLIC BLOOD PRESSURE: 129 MMHG | TEMPERATURE: 97.8 F | OXYGEN SATURATION: 100 % | RESPIRATION RATE: 14 BRPM | HEART RATE: 72 BPM | DIASTOLIC BLOOD PRESSURE: 64 MMHG | HEIGHT: 65 IN | BODY MASS INDEX: 30.66 KG/M2

## 2018-08-16 PROCEDURE — 93978 VASCULAR STUDY: CPT

## 2018-08-16 PROCEDURE — 99214 OFFICE O/P EST MOD 30 MIN: CPT

## 2018-08-16 PROCEDURE — 99213 OFFICE O/P EST LOW 20 MIN: CPT

## 2018-08-17 LAB
ALBUMIN SERPL ELPH-MCNC: 4 G/DL
ALP BLD-CCNC: 80 U/L
ALT SERPL-CCNC: 7 U/L
ANION GAP SERPL CALC-SCNC: 16 MMOL/L
AST SERPL-CCNC: 19 U/L
BASOPHILS # BLD AUTO: 0.03 K/UL
BASOPHILS NFR BLD AUTO: 0.6 %
BILIRUB SERPL-MCNC: 0.2 MG/DL
BUN SERPL-MCNC: 12 MG/DL
CALCIUM SERPL-MCNC: 9.4 MG/DL
CHLORIDE SERPL-SCNC: 103 MMOL/L
CO2 SERPL-SCNC: 20 MMOL/L
CREAT SERPL-MCNC: 0.9 MG/DL
CRP SERPL-MCNC: 1.1 MG/DL
EOSINOPHIL # BLD AUTO: 0.19 K/UL
EOSINOPHIL NFR BLD AUTO: 3.8 %
ERYTHROCYTE [SEDIMENTATION RATE] IN BLOOD BY WESTERGREN METHOD: 12 MM/HR
GLUCOSE SERPL-MCNC: 140 MG/DL
HCT VFR BLD CALC: 32.4 %
HGB BLD-MCNC: 9.7 G/DL
IMM GRANULOCYTES NFR BLD AUTO: 0 %
LYMPHOCYTES # BLD AUTO: 1.88 K/UL
LYMPHOCYTES NFR BLD AUTO: 37.4 %
MAN DIFF?: NORMAL
MCHC RBC-ENTMCNC: 25.5 PG
MCHC RBC-ENTMCNC: 29.9 GM/DL
MCV RBC AUTO: 85 FL
MONOCYTES # BLD AUTO: 0.32 K/UL
MONOCYTES NFR BLD AUTO: 6.4 %
NEUTROPHILS # BLD AUTO: 2.61 K/UL
NEUTROPHILS NFR BLD AUTO: 51.8 %
PLATELET # BLD AUTO: 264 K/UL
POTASSIUM SERPL-SCNC: 4.4 MMOL/L
PROT SERPL-MCNC: 7.9 G/DL
RBC # BLD: 3.81 M/UL
RBC # FLD: 17.8 %
SODIUM SERPL-SCNC: 139 MMOL/L
WBC # FLD AUTO: 5.03 K/UL

## 2018-08-23 ENCOUNTER — APPOINTMENT (OUTPATIENT)
Dept: HEART AND VASCULAR | Facility: CLINIC | Age: 68
End: 2018-08-23
Payer: MEDICARE

## 2018-08-23 VITALS
DIASTOLIC BLOOD PRESSURE: 90 MMHG | HEIGHT: 65 IN | WEIGHT: 188 LBS | BODY MASS INDEX: 31.32 KG/M2 | RESPIRATION RATE: 16 BRPM | SYSTOLIC BLOOD PRESSURE: 140 MMHG | HEART RATE: 72 BPM

## 2018-08-23 PROCEDURE — 99214 OFFICE O/P EST MOD 30 MIN: CPT | Mod: 25

## 2018-08-23 PROCEDURE — 93000 ELECTROCARDIOGRAM COMPLETE: CPT

## 2018-08-23 RX ORDER — SULFAMETHOXAZOLE AND TRIMETHOPRIM 800; 160 MG/1; MG/1
800-160 TABLET ORAL
Qty: 84 | Refills: 0 | Status: DISCONTINUED | COMMUNITY
Start: 2018-07-12 | End: 2018-08-23

## 2018-08-23 RX ORDER — ROSUVASTATIN CALCIUM 20 MG/1
20 TABLET, FILM COATED ORAL DAILY
Qty: 42 | Refills: 0 | Status: DISCONTINUED | COMMUNITY
Start: 2018-07-12 | End: 2018-08-23

## 2018-08-23 RX ORDER — RIFAMPIN 300 MG/1
300 CAPSULE ORAL DAILY
Qty: 60 | Refills: 0 | Status: DISCONTINUED | COMMUNITY
Start: 2018-06-14 | End: 2018-08-23

## 2018-08-23 RX ORDER — RIFAMPIN 300 MG/1
300 CAPSULE ORAL DAILY
Qty: 20 | Refills: 0 | Status: DISCONTINUED | COMMUNITY
Start: 2018-08-09 | End: 2018-08-23

## 2018-08-24 ENCOUNTER — RESULT CHARGE (OUTPATIENT)
Age: 68
End: 2018-08-24

## 2018-09-20 ENCOUNTER — APPOINTMENT (OUTPATIENT)
Dept: HEART AND VASCULAR | Facility: CLINIC | Age: 68
End: 2018-09-20

## 2018-09-22 ENCOUNTER — RX RENEWAL (OUTPATIENT)
Age: 68
End: 2018-09-22

## 2018-09-22 DIAGNOSIS — N40.0 BENIGN PROSTATIC HYPERPLASIA WITHOUT LOWER URINARY TRACT SYMPMS: ICD-10-CM

## 2018-09-26 ENCOUNTER — APPOINTMENT (OUTPATIENT)
Dept: HEART AND VASCULAR | Facility: CLINIC | Age: 68
End: 2018-09-26

## 2018-10-16 ENCOUNTER — APPOINTMENT (OUTPATIENT)
Dept: HEART AND VASCULAR | Facility: CLINIC | Age: 68
End: 2018-10-16
Payer: MEDICARE

## 2018-10-16 VITALS — DIASTOLIC BLOOD PRESSURE: 82 MMHG | HEART RATE: 75 BPM | SYSTOLIC BLOOD PRESSURE: 136 MMHG

## 2018-10-16 PROCEDURE — G0008: CPT

## 2018-10-16 PROCEDURE — 99212 OFFICE O/P EST SF 10 MIN: CPT | Mod: 25

## 2018-10-16 PROCEDURE — 90662 IIV NO PRSV INCREASED AG IM: CPT

## 2018-10-16 NOTE — PHYSICAL EXAM
[No Acute Distress] : no acute distress [Well Nourished] : well nourished [Well Developed] : well developed [Normal Oropharynx] : the oropharynx was normal [Supple] : supple [No Respiratory Distress] : no respiratory distress  [Clear to Auscultation] : lungs were clear to auscultation bilaterally

## 2018-10-16 NOTE — ASSESSMENT
[FreeTextEntry1] : Pt seen for annual flu shot administration. Pt denies chills, fever, running nose. Allergies reviewed during the visit.\par Flu shot administered to R Deltoid; no local reaction noted. Possible SE reviewed with the pt - good understanding verbalized.\par

## 2018-10-16 NOTE — HISTORY OF PRESENT ILLNESS
[FreeTextEntry1] : Pt is seen in the office for annual Flu Shot\par  [de-identified] : Pt denies chills, fever, running nose. No known allergies.\par

## 2018-11-20 ENCOUNTER — LABORATORY RESULT (OUTPATIENT)
Age: 68
End: 2018-11-20

## 2018-11-21 ENCOUNTER — APPOINTMENT (OUTPATIENT)
Dept: HEART AND VASCULAR | Facility: CLINIC | Age: 68
End: 2018-11-21
Payer: MEDICARE

## 2018-11-21 VITALS — DIASTOLIC BLOOD PRESSURE: 80 MMHG | SYSTOLIC BLOOD PRESSURE: 130 MMHG

## 2018-11-21 PROCEDURE — ZZZZZ: CPT

## 2018-11-26 LAB
ALBUMIN SERPL ELPH-MCNC: 4.2 G/DL
ALP BLD-CCNC: 83 U/L
ALT SERPL-CCNC: 9 U/L
ANION GAP SERPL CALC-SCNC: 14 MMOL/L
AST SERPL-CCNC: 15 U/L
BASOPHILS # BLD AUTO: 0.04 K/UL
BASOPHILS NFR BLD AUTO: 0.6 %
BILIRUB SERPL-MCNC: 0.3 MG/DL
BUN SERPL-MCNC: 20 MG/DL
CALCIUM SERPL-MCNC: 9.5 MG/DL
CHLORIDE SERPL-SCNC: 106 MMOL/L
CHOLEST SERPL-MCNC: 142 MG/DL
CHOLEST/HDLC SERPL: 2.7 RATIO
CO2 SERPL-SCNC: 21 MMOL/L
CREAT SERPL-MCNC: 1.07 MG/DL
EOSINOPHIL # BLD AUTO: 0.46 K/UL
EOSINOPHIL NFR BLD AUTO: 7.4 %
GLUCOSE SERPL-MCNC: 91 MG/DL
HBA1C MFR BLD HPLC: 5.4 %
HCT VFR BLD CALC: 35.6 %
HDLC SERPL-MCNC: 53 MG/DL
HGB BLD-MCNC: 10.9 G/DL
IMM GRANULOCYTES NFR BLD AUTO: 0.2 %
LDLC SERPL CALC-MCNC: 76 MG/DL
LYMPHOCYTES # BLD AUTO: 2.3 K/UL
LYMPHOCYTES NFR BLD AUTO: 37.2 %
MAN DIFF?: NORMAL
MCHC RBC-ENTMCNC: 26.1 PG
MCHC RBC-ENTMCNC: 30.6 GM/DL
MCV RBC AUTO: 85.4 FL
MONOCYTES # BLD AUTO: 0.55 K/UL
MONOCYTES NFR BLD AUTO: 8.9 %
NEUTROPHILS # BLD AUTO: 2.83 K/UL
NEUTROPHILS NFR BLD AUTO: 45.7 %
PLATELET # BLD AUTO: 206 K/UL
POTASSIUM SERPL-SCNC: 5.5 MMOL/L
PROT SERPL-MCNC: 6.9 G/DL
RBC # BLD: 4.17 M/UL
RBC # FLD: 18.5 %
SODIUM SERPL-SCNC: 141 MMOL/L
T3 SERPL-MCNC: 107 NG/DL
T3FREE SERPL-MCNC: 2.86 PG/ML
T3RU NFR SERPL: 1.01 INDEX
T4 FREE SERPL-MCNC: 1.1 NG/DL
T4 SERPL-MCNC: 5.7 UG/DL
TRIGL SERPL-MCNC: 65 MG/DL
TSH SERPL-ACNC: 1.38 UIU/ML
WBC # FLD AUTO: 6.19 K/UL

## 2018-11-28 ENCOUNTER — APPOINTMENT (OUTPATIENT)
Dept: HEART AND VASCULAR | Facility: CLINIC | Age: 68
End: 2018-11-28
Payer: MEDICARE

## 2018-11-28 VITALS
RESPIRATION RATE: 14 BRPM | SYSTOLIC BLOOD PRESSURE: 130 MMHG | WEIGHT: 200 LBS | HEIGHT: 65 IN | HEART RATE: 72 BPM | BODY MASS INDEX: 33.32 KG/M2 | DIASTOLIC BLOOD PRESSURE: 80 MMHG

## 2018-11-28 DIAGNOSIS — B37.2 CANDIDIASIS OF SKIN AND NAIL: ICD-10-CM

## 2018-11-28 DIAGNOSIS — G89.18 OTHER ACUTE POSTPROCEDURAL PAIN: ICD-10-CM

## 2018-11-28 DIAGNOSIS — I99.9 UNSPECIFIED DISORDER OF CIRCULATORY SYSTEM: ICD-10-CM

## 2018-11-28 DIAGNOSIS — R78.81 BACTEREMIA: ICD-10-CM

## 2018-11-28 PROCEDURE — 99214 OFFICE O/P EST MOD 30 MIN: CPT

## 2018-11-28 PROCEDURE — 76770 US EXAM ABDO BACK WALL COMP: CPT

## 2018-11-28 PROCEDURE — 93000 ELECTROCARDIOGRAM COMPLETE: CPT

## 2018-11-28 PROCEDURE — 76700 US EXAM ABDOM COMPLETE: CPT

## 2018-11-28 NOTE — DISCUSSION/SUMMARY
[Coronary Artery Disease] : coronary artery disease [Dietary Modification] : dietary modification [Weight Reduction] : weight reduction [Hypertension] : hypertension [Stable] : stable [None] : none [Exercise Regimen] : an exercise regimen [Weight Loss] : weight loss [Sodium Restriction] : sodium restriction [Patient] : the patient [FreeTextEntry1] : AAA- Stable; no further intervention at this time.\par Blood work reviewed with pt. Anemia mch improved. Maintain a low caloric, low sodium, low cholesterol  diet. Dietary counseling given, diet and exercise discussed in detail, weight loss recommended.\par

## 2018-11-28 NOTE — REASON FOR VISIT
[Follow-Up - Clinic] : a clinic follow-up of [Coronary Artery Disease] : coronary artery disease [Hypertension] : hypertension [FreeTextEntry1] : AAA

## 2018-11-28 NOTE — PHYSICAL EXAM
[General Appearance - Well Developed] : well developed [Normal Appearance] : normal appearance [Well Groomed] : well groomed [General Appearance - Well Nourished] : well nourished [No Deformities] : no deformities [General Appearance - In No Acute Distress] : no acute distress [Normal Conjunctiva] : the conjunctiva exhibited no abnormalities [Eyelids - No Xanthelasma] : the eyelids demonstrated no xanthelasmas [Normal Oral Mucosa] : normal oral mucosa [No Oral Pallor] : no oral pallor [No Oral Cyanosis] : no oral cyanosis [Normal Jugular Venous A Waves Present] : normal jugular venous A waves present [Normal Jugular Venous V Waves Present] : normal jugular venous V waves present [No Jugular Venous Valenzuela A Waves] : no jugular venous valenzuela A waves [Respiration, Rhythm And Depth] : normal respiratory rhythm and effort [Exaggerated Use Of Accessory Muscles For Inspiration] : no accessory muscle use [Auscultation Breath Sounds / Voice Sounds] : lungs were clear to auscultation bilaterally [Heart Rate And Rhythm] : heart rate and rhythm were normal [Heart Sounds] : normal S1 and S2 [Systolic grade ___/6] : A grade [unfilled]/6 systolic murmur was heard. [Abdomen Soft] : soft [Abdomen Tenderness] : non-tender [Abdomen Mass (___ Cm)] : no abdominal mass palpated [Abnormal Walk] : normal gait [Nail Clubbing] : no clubbing of the fingernails [Cyanosis, Localized] : no localized cyanosis [Petechial Hemorrhages (___cm)] : no petechial hemorrhages [Skin Color & Pigmentation] : normal skin color and pigmentation [] : no rash [No Venous Stasis] : no venous stasis [Skin Lesions] : no skin lesions [No Skin Ulcers] : no skin ulcer [No Xanthoma] : no  xanthoma was observed [Oriented To Time, Place, And Person] : oriented to person, place, and time [Affect] : the affect was normal [Mood] : the mood was normal [No Anxiety] : not feeling anxious

## 2018-11-28 NOTE — HISTORY OF PRESENT ILLNESS
[FreeTextEntry1] : Denies Chest Pain, SOB, Dizziness, Leg edema, Orthopnea, PND, Palpitations, Syncope, KRISHNA, Diaphoresis, unchanged clinical status.\par

## 2018-12-12 PROBLEM — M51.26 LUMBAR DISC HERNIATION: Status: ACTIVE | Noted: 2018-04-05

## 2018-12-13 ENCOUNTER — APPOINTMENT (OUTPATIENT)
Dept: NEUROSURGERY | Facility: CLINIC | Age: 68
End: 2018-12-13
Payer: MEDICARE

## 2018-12-13 DIAGNOSIS — M51.26 OTHER INTERVERTEBRAL DISC DISPLACEMENT, LUMBAR REGION: ICD-10-CM

## 2018-12-24 PROBLEM — B37.2 CANDIDAL INTERTRIGO: Status: RESOLVED | Noted: 2018-07-09 | Resolved: 2018-12-24

## 2018-12-27 ENCOUNTER — APPOINTMENT (OUTPATIENT)
Dept: NEUROSURGERY | Facility: CLINIC | Age: 68
End: 2018-12-27
Payer: MEDICARE

## 2018-12-27 VITALS
BODY MASS INDEX: 33.32 KG/M2 | RESPIRATION RATE: 16 BRPM | HEIGHT: 65 IN | DIASTOLIC BLOOD PRESSURE: 79 MMHG | SYSTOLIC BLOOD PRESSURE: 154 MMHG | WEIGHT: 200 LBS | HEART RATE: 94 BPM | OXYGEN SATURATION: 100 % | TEMPERATURE: 97.2 F

## 2018-12-27 PROCEDURE — 99214 OFFICE O/P EST MOD 30 MIN: CPT

## 2019-01-09 ENCOUNTER — MEDICATION RENEWAL (OUTPATIENT)
Age: 69
End: 2019-01-09

## 2019-01-18 ENCOUNTER — APPOINTMENT (OUTPATIENT)
Dept: NEUROSURGERY | Facility: CLINIC | Age: 69
End: 2019-01-18
Payer: MEDICARE

## 2019-01-18 VITALS
DIASTOLIC BLOOD PRESSURE: 78 MMHG | HEIGHT: 65 IN | RESPIRATION RATE: 16 BRPM | OXYGEN SATURATION: 100 % | HEART RATE: 83 BPM | SYSTOLIC BLOOD PRESSURE: 159 MMHG | BODY MASS INDEX: 33.32 KG/M2 | WEIGHT: 200 LBS

## 2019-01-18 DIAGNOSIS — Z98.1 ARTHRODESIS STATUS: ICD-10-CM

## 2019-01-18 PROCEDURE — 99214 OFFICE O/P EST MOD 30 MIN: CPT

## 2019-01-30 PROBLEM — Z98.1 S/P LUMBAR FUSION: Status: ACTIVE | Noted: 2018-05-23

## 2019-01-30 NOTE — REASON FOR VISIT
[Follow-Up: _____] : a [unfilled] follow-up visit [FreeTextEntry1] : Here to discuss plan of care.\par His case was discussed in Spine Conference and recommendation for BRACE and BONE STIMULATOR.\par Both devices have been applied and pt is wearing devices at visit today.\par No Additional Gross Motor weakness reported.

## 2019-01-30 NOTE — PHYSICAL EXAM
[General Appearance - Alert] : alert [General Appearance - In No Acute Distress] : in no acute distress [Oriented To Time, Place, And Person] : oriented to person, place, and time [Impaired Insight] : insight and judgment were intact [Cranial Nerves Optic (II)] : visual acuity intact bilaterally,  pupils equal round and reactive to light [Cranial Nerves Oculomotor (III)] : extraocular motion intact [Cranial Nerves Trigeminal (V)] : facial sensation intact symmetrically [Cranial Nerves Facial (VII)] : face symmetrical [Cranial Nerves Vestibulocochlear (VIII)] : hearing was intact bilaterally [Cranial Nerves Glossopharyngeal (IX)] : tongue and palate midline [Cranial Nerves Accessory (XI - Cranial And Spinal)] : head turning and shoulder shrug symmetric [Cranial Nerves Hypoglossal (XII)] : there was no tongue deviation with protrusion [Motor Tone] : muscle tone was normal in all four extremities [Motor Strength] : muscle strength was normal in all four extremities [Sensation Tactile Decrease] : light touch was intact [Sclera] : the sclera and conjunctiva were normal [Outer Ear] : the ears and nose were normal in appearance [] : no respiratory distress [Heart Rate And Rhythm] : heart rate was normal and rhythm regular [Abdomen Soft] : soft [Abnormal Walk] : normal gait [Skin Color & Pigmentation] : normal skin color and pigmentation

## 2019-01-30 NOTE — ASSESSMENT
[FreeTextEntry1] : CONTINUE wearing Brace and using Bone STIMULATOR.\par Follow up in [facility] with NEW CT LUMBAR SPINE\par Smoking  CESSATION recommended

## 2019-01-31 ENCOUNTER — APPOINTMENT (OUTPATIENT)
Dept: NEUROSURGERY | Facility: CLINIC | Age: 69
End: 2019-01-31

## 2019-03-21 ENCOUNTER — APPOINTMENT (OUTPATIENT)
Dept: HEART AND VASCULAR | Facility: CLINIC | Age: 69
End: 2019-03-21
Payer: MEDICARE

## 2019-03-21 VITALS — DIASTOLIC BLOOD PRESSURE: 80 MMHG | SYSTOLIC BLOOD PRESSURE: 130 MMHG

## 2019-03-21 PROCEDURE — 36415 COLL VENOUS BLD VENIPUNCTURE: CPT

## 2019-03-28 LAB
ALBUMIN SERPL ELPH-MCNC: 4.3 G/DL
ALP BLD-CCNC: 75 U/L
ALT SERPL-CCNC: 13 U/L
ANION GAP SERPL CALC-SCNC: 11 MMOL/L
AST SERPL-CCNC: 13 U/L
BASOPHILS # BLD AUTO: 0.06 K/UL
BASOPHILS NFR BLD AUTO: 1 %
BILIRUB SERPL-MCNC: 0.4 MG/DL
BUN SERPL-MCNC: 14 MG/DL
CALCIUM SERPL-MCNC: 9.5 MG/DL
CHLORIDE SERPL-SCNC: 105 MMOL/L
CHOLEST SERPL-MCNC: 144 MG/DL
CHOLEST/HDLC SERPL: 2.8 RATIO
CO2 SERPL-SCNC: 26 MMOL/L
CREAT SERPL-MCNC: 0.92 MG/DL
CRP SERPL-MCNC: <0.1 MG/DL
EOSINOPHIL # BLD AUTO: 0.22 K/UL
EOSINOPHIL NFR BLD AUTO: 3.6 %
ERYTHROCYTE [SEDIMENTATION RATE] IN BLOOD BY WESTERGREN METHOD: 6 MM/HR
GLUCOSE SERPL-MCNC: 96 MG/DL
HBA1C MFR BLD HPLC: 5.6 %
HCT VFR BLD CALC: 38.4 %
HDLC SERPL-MCNC: 51 MG/DL
HGB BLD-MCNC: 11.4 G/DL
IMM GRANULOCYTES NFR BLD AUTO: 0.2 %
LDLC SERPL CALC-MCNC: 76 MG/DL
LYMPHOCYTES # BLD AUTO: 2.73 K/UL
LYMPHOCYTES NFR BLD AUTO: 45 %
MAN DIFF?: NORMAL
MCHC RBC-ENTMCNC: 26.2 PG
MCHC RBC-ENTMCNC: 29.7 GM/DL
MCV RBC AUTO: 88.3 FL
MONOCYTES # BLD AUTO: 0.59 K/UL
MONOCYTES NFR BLD AUTO: 9.7 %
NEUTROPHILS # BLD AUTO: 2.46 K/UL
NEUTROPHILS NFR BLD AUTO: 40.5 %
PLATELET # BLD AUTO: 237 K/UL
POTASSIUM SERPL-SCNC: 4.6 MMOL/L
PROT SERPL-MCNC: 6.6 G/DL
RBC # BLD: 4.35 M/UL
RBC # FLD: 18.1 %
SODIUM SERPL-SCNC: 142 MMOL/L
T3 SERPL-MCNC: 110 NG/DL
T3FREE SERPL-MCNC: 3.26 PG/ML
T4 FREE SERPL-MCNC: 1.3 NG/DL
T4 SERPL-MCNC: 6.6 UG/DL
TRIGL SERPL-MCNC: 83 MG/DL
TSH SERPL-ACNC: 2.11 UIU/ML
WBC # FLD AUTO: 6.07 K/UL

## 2019-04-02 ENCOUNTER — APPOINTMENT (OUTPATIENT)
Dept: NEUROSURGERY | Facility: CLINIC | Age: 69
End: 2019-04-02
Payer: MEDICARE

## 2019-04-02 ENCOUNTER — APPOINTMENT (OUTPATIENT)
Dept: VASCULAR SURGERY | Facility: CLINIC | Age: 69
End: 2019-04-02
Payer: MEDICARE

## 2019-04-02 VITALS
HEIGHT: 65 IN | BODY MASS INDEX: 32.15 KG/M2 | WEIGHT: 193 LBS | HEART RATE: 90 BPM | RESPIRATION RATE: 18 BRPM | OXYGEN SATURATION: 96 % | DIASTOLIC BLOOD PRESSURE: 60 MMHG | SYSTOLIC BLOOD PRESSURE: 105 MMHG

## 2019-04-02 DIAGNOSIS — R29.898 OTHER SYMPTOMS AND SIGNS INVOLVING THE MUSCULOSKELETAL SYSTEM: ICD-10-CM

## 2019-04-02 PROCEDURE — 99212 OFFICE O/P EST SF 10 MIN: CPT

## 2019-04-02 PROCEDURE — 99214 OFFICE O/P EST MOD 30 MIN: CPT

## 2019-04-02 PROCEDURE — 93978 VASCULAR STUDY: CPT

## 2019-04-02 NOTE — REVIEW OF SYSTEMS
[As Noted in HPI] : as noted in HPI [Numbness] : numbness [Tingling] : tingling [Abnormal Sensation] : an abnormal sensation [Negative] : Psychiatric

## 2019-04-05 NOTE — PHYSICAL EXAM
[Normal Thyroid] : the thyroid was normal [Normal Breath Sounds] : Normal breath sounds [Normal Rate and Rhythm] : normal rate and rhythm [1+] : right 1+ [2+] : left 2+ [Ankle Swelling (On Exam)] : present [Ankle Swelling Bilaterally] : bilaterally  [Ankle Swelling On The Right] : mild [No Rash or Lesion] : No rash or lesion [Alert] : alert [Calm] : calm [JVD] : no jugular venous distention  [Varicose Veins Of Lower Extremities] : not present [] : not present [de-identified] : well-nourished, NAD [de-identified] : NC/AT, anicteric [de-identified] : SNTND [de-identified] : FROM throughout, strength 5/5x4, no palpable cords

## 2019-04-05 NOTE — PROCEDURE
[FreeTextEntry1] : Duplex reveals widely patent R KERRI stents and AAA measuring 4.4x4.4cm and stable

## 2019-04-05 NOTE — HISTORY OF PRESENT ILLNESS
[FreeTextEntry1] : 65yoM previously seen for previous moderate-severe claudication and an asymptomatic small AAA, s/p R KERRI PTA/stent and L SFA/PTA PTA stent, returns for surveillance duplex of R KERRI revasc as his b/l anterior thighs have been tight and painful since his spine surgery w/Dr. Alvarez.  Mr. 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 Serna (ID).  Therapy and rehab have been delayed by his infection, but his strength and ROM are slowly returning.\par \par Pt denies rest pain in the legs/feet, but is concerned that his stent has been affected.

## 2019-04-05 NOTE — ASSESSMENT
[FreeTextEntry1] : 65yoM s/p R KERRI PTA/stent and L SFA/PTA PTA stent for moderate-severe claudication b/l, returning for surveillance duplex of the legs and evaluation of an asymptomatic AAA, measured at 4.4x4.4cm today on duplex.\par \par Pt instructed to return to office in 6mos for CTA a/p as his aneurysmal diameter on duplex today has reached 4.4cm.  Pt scheduled to see Dr. Alvarez today for his LE weakness and LBP, no obvious limitations in blood flow to the pelvis/thighs noted on duplex today.  Will assess again in 6mos, and evaluate LLE SFA/tibial stents.\par \par I personally discussed this patient with the Physician Assistant at the time of the visit. I agree with the assessment and plan as written

## 2019-04-10 NOTE — PHYSICAL EXAM
[General Appearance - Alert] : alert [General Appearance - In No Acute Distress] : in no acute distress [Oriented To Time, Place, And Person] : oriented to person, place, and time [Impaired Insight] : insight and judgment were intact [Cranial Nerves Optic (II)] : visual acuity intact bilaterally,  pupils equal round and reactive to light [Cranial Nerves Oculomotor (III)] : extraocular motion intact [Cranial Nerves Trigeminal (V)] : facial sensation intact symmetrically [Cranial Nerves Facial (VII)] : face symmetrical [Cranial Nerves Vestibulocochlear (VIII)] : hearing was intact bilaterally [Cranial Nerves Glossopharyngeal (IX)] : tongue and palate midline [Cranial Nerves Accessory (XI - Cranial And Spinal)] : head turning and shoulder shrug symmetric [Cranial Nerves Hypoglossal (XII)] : there was no tongue deviation with protrusion [Motor Tone] : muscle tone was normal in all four extremities [Motor Strength] : muscle strength was normal in all four extremities [Sensation Tactile Decrease] : light touch was intact [Sclera] : the sclera and conjunctiva were normal [Outer Ear] : the ears and nose were normal in appearance [] : no respiratory distress [Exaggerated Use Of Accessory Muscles For Inspiration] : no accessory muscle use [Heart Rate And Rhythm] : heart rate was normal and rhythm regular [Abdomen Soft] : soft [Skin Color & Pigmentation] : normal skin color and pigmentation [FreeTextEntry8] : Bilateral leg weakness

## 2019-04-10 NOTE — ASSESSMENT
[FreeTextEntry1] : Need Follow up CT LUMBAR SPINE and MRI Lumbar Spine. \par Dr. Alvarez will call to discuss results\par Continue to wear brace\par Smoking cessation education provided\par

## 2019-04-10 NOTE — REASON FOR VISIT
[Follow-Up: _____] : a [unfilled] follow-up visit [FreeTextEntry1] : S/P Lumbar Spine fusion surgery\par Doing well\par Wears brace and Bone Stimulator\par He continues smoking cigarettes despite multiple smoking cessation counseling\par Bilateral leg weakness and lowerback pain. Difficulty walking short distances

## 2019-04-22 ENCOUNTER — APPOINTMENT (OUTPATIENT)
Dept: HEART AND VASCULAR | Facility: CLINIC | Age: 69
End: 2019-04-22

## 2019-04-23 PROCEDURE — 85018 HEMOGLOBIN: CPT

## 2019-04-23 PROCEDURE — 80048 BASIC METABOLIC PNL TOTAL CA: CPT

## 2019-04-23 PROCEDURE — P9045: CPT

## 2019-04-23 PROCEDURE — 84295 ASSAY OF SERUM SODIUM: CPT

## 2019-04-23 PROCEDURE — 84100 ASSAY OF PHOSPHORUS: CPT

## 2019-04-23 PROCEDURE — 85027 COMPLETE CBC AUTOMATED: CPT

## 2019-04-23 PROCEDURE — 83735 ASSAY OF MAGNESIUM: CPT

## 2019-04-23 PROCEDURE — 84132 ASSAY OF SERUM POTASSIUM: CPT

## 2019-04-23 PROCEDURE — 86901 BLOOD TYPING SEROLOGIC RH(D): CPT

## 2019-04-23 PROCEDURE — 82962 GLUCOSE BLOOD TEST: CPT

## 2019-04-23 PROCEDURE — 97116 GAIT TRAINING THERAPY: CPT

## 2019-04-23 PROCEDURE — 97161 PT EVAL LOW COMPLEX 20 MIN: CPT

## 2019-04-23 PROCEDURE — C1713: CPT

## 2019-04-23 PROCEDURE — 95940 IONM IN OPERATNG ROOM 15 MIN: CPT

## 2019-04-23 PROCEDURE — 72132 CT LUMBAR SPINE W/DYE: CPT

## 2019-04-23 PROCEDURE — 86850 RBC ANTIBODY SCREEN: CPT

## 2019-04-23 PROCEDURE — C1889: CPT

## 2019-04-23 PROCEDURE — 86900 BLOOD TYPING SEROLOGIC ABO: CPT

## 2019-04-23 PROCEDURE — 76000 FLUOROSCOPY <1 HR PHYS/QHP: CPT

## 2019-04-23 PROCEDURE — 82330 ASSAY OF CALCIUM: CPT

## 2019-04-23 PROCEDURE — 36415 COLL VENOUS BLD VENIPUNCTURE: CPT

## 2019-04-25 ENCOUNTER — RX RENEWAL (OUTPATIENT)
Age: 69
End: 2019-04-25

## 2019-04-30 ENCOUNTER — RX RENEWAL (OUTPATIENT)
Age: 69
End: 2019-04-30

## 2019-04-30 ENCOUNTER — APPOINTMENT (OUTPATIENT)
Dept: HEART AND VASCULAR | Facility: CLINIC | Age: 69
End: 2019-04-30
Payer: MEDICARE

## 2019-04-30 VITALS
HEIGHT: 65 IN | WEIGHT: 200 LBS | RESPIRATION RATE: 14 BRPM | SYSTOLIC BLOOD PRESSURE: 110 MMHG | BODY MASS INDEX: 33.32 KG/M2 | DIASTOLIC BLOOD PRESSURE: 66 MMHG | HEART RATE: 84 BPM

## 2019-04-30 PROCEDURE — 93880 EXTRACRANIAL BILAT STUDY: CPT

## 2019-04-30 PROCEDURE — 99214 OFFICE O/P EST MOD 30 MIN: CPT

## 2019-04-30 PROCEDURE — 93000 ELECTROCARDIOGRAM COMPLETE: CPT

## 2019-04-30 PROCEDURE — 93306 TTE W/DOPPLER COMPLETE: CPT

## 2019-05-03 NOTE — REASON FOR VISIT
[Follow-Up - Clinic] : a clinic follow-up of [Hypertension] : hypertension [Coronary Artery Disease] : coronary artery disease [Dyspnea] : dyspnea [FreeTextEntry1] : cardiac murmur and known carotid artery disease.

## 2019-05-03 NOTE — PHYSICAL EXAM
[General Appearance - Well Developed] : well developed [General Appearance - Well Nourished] : well nourished [Well Groomed] : well groomed [Normal Appearance] : normal appearance [General Appearance - In No Acute Distress] : no acute distress [Normal Conjunctiva] : the conjunctiva exhibited no abnormalities [No Deformities] : no deformities [Eyelids - No Xanthelasma] : the eyelids demonstrated no xanthelasmas [Normal Oral Mucosa] : normal oral mucosa [No Oral Pallor] : no oral pallor [Normal Jugular Venous V Waves Present] : normal jugular venous V waves present [No Oral Cyanosis] : no oral cyanosis [Normal Jugular Venous A Waves Present] : normal jugular venous A waves present [No Jugular Venous Valenzuela A Waves] : no jugular venous valenzuela A waves [Respiration, Rhythm And Depth] : normal respiratory rhythm and effort [Heart Rate And Rhythm] : heart rate and rhythm were normal [Exaggerated Use Of Accessory Muscles For Inspiration] : no accessory muscle use [Auscultation Breath Sounds / Voice Sounds] : lungs were clear to auscultation bilaterally [Abdomen Soft] : soft [Heart Sounds] : normal S1 and S2 [Systolic grade ___/6] : A grade [unfilled]/6 systolic murmur was heard. [Abnormal Walk] : normal gait [Abdomen Mass (___ Cm)] : no abdominal mass palpated [Abdomen Tenderness] : non-tender [Cyanosis, Localized] : no localized cyanosis [Nail Clubbing] : no clubbing of the fingernails [Petechial Hemorrhages (___cm)] : no petechial hemorrhages [No Venous Stasis] : no venous stasis [Skin Color & Pigmentation] : normal skin color and pigmentation [] : no ischemic changes [Skin Lesions] : no skin lesions [No Skin Ulcers] : no skin ulcer [No Xanthoma] : no  xanthoma was observed [Oriented To Time, Place, And Person] : oriented to person, place, and time [Mood] : the mood was normal [Affect] : the affect was normal [No Anxiety] : not feeling anxious

## 2019-05-03 NOTE — HISTORY OF PRESENT ILLNESS
[FreeTextEntry1] : The patient c/o sob for the last 2 weeks described as mild in severity, worsening with exertion and relieved with rest. The patient denies associated chest pain, orthopnea, palpitations, leg edema, dizziness, diaphoresis, PND. The patient considers this a change in their clinical condition/status.\par Echo ordered to assess for change in LV function/possible worsening of valvular heart disease.\par

## 2019-05-03 NOTE — DISCUSSION/SUMMARY
[Coronary Artery Disease] : coronary artery disease [Dietary Modification] : dietary modification [Weight Reduction] : weight reduction [Stable] : stable [Hypertension] : hypertension [Weight Loss] : weight loss [None] : none [Exercise Regimen] : an exercise regimen [Sodium Restriction] : sodium restriction [Patient] : the patient [FreeTextEntry1] : Carotid artery disease- Stable; no further intervention at this time.\par SOB/cardiac murmur - Echo without change LV fxn; will consider further w/u if sx.'s persist or worsen. Discussed at length with patient.\par Blood work reviewed with pt. Maintain a low caloric, low sodium, low cholesterol  diet. Dietary counseling given, diet and exercise discussed in detail, weight loss recommended.\par

## 2019-08-15 ENCOUNTER — APPOINTMENT (OUTPATIENT)
Dept: HEART AND VASCULAR | Facility: CLINIC | Age: 69
End: 2019-08-15
Payer: MEDICARE

## 2019-08-15 VITALS — DIASTOLIC BLOOD PRESSURE: 70 MMHG | SYSTOLIC BLOOD PRESSURE: 120 MMHG

## 2019-08-15 PROCEDURE — 36415 COLL VENOUS BLD VENIPUNCTURE: CPT

## 2019-08-19 LAB
ALBUMIN SERPL ELPH-MCNC: 4.2 G/DL
ALP BLD-CCNC: 60 U/L
ALT SERPL-CCNC: 21 U/L
ANION GAP SERPL CALC-SCNC: 15 MMOL/L
AST SERPL-CCNC: 15 U/L
BASOPHILS # BLD AUTO: 0.02 K/UL
BASOPHILS NFR BLD AUTO: 0.2 %
BILIRUB SERPL-MCNC: 0.4 MG/DL
BUN SERPL-MCNC: 17 MG/DL
CALCIUM SERPL-MCNC: 9.1 MG/DL
CHLORIDE SERPL-SCNC: 104 MMOL/L
CHOLEST SERPL-MCNC: 138 MG/DL
CHOLEST/HDLC SERPL: 3.1 RATIO
CO2 SERPL-SCNC: 22 MMOL/L
CREAT SERPL-MCNC: 0.87 MG/DL
EOSINOPHIL # BLD AUTO: 0.03 K/UL
EOSINOPHIL NFR BLD AUTO: 0.4 %
ESTIMATED AVERAGE GLUCOSE: 117 MG/DL
GLUCOSE SERPL-MCNC: 87 MG/DL
HBA1C MFR BLD HPLC: 5.7 %
HCT VFR BLD CALC: 40.5 %
HDLC SERPL-MCNC: 45 MG/DL
HGB BLD-MCNC: 12.5 G/DL
IMM GRANULOCYTES NFR BLD AUTO: 1 %
LDLC SERPL CALC-MCNC: 74 MG/DL
LYMPHOCYTES # BLD AUTO: 2.75 K/UL
LYMPHOCYTES NFR BLD AUTO: 33.2 %
MAN DIFF?: NORMAL
MCHC RBC-ENTMCNC: 27.6 PG
MCHC RBC-ENTMCNC: 30.9 GM/DL
MCV RBC AUTO: 89.4 FL
MONOCYTES # BLD AUTO: 0.84 K/UL
MONOCYTES NFR BLD AUTO: 10.1 %
NEUTROPHILS # BLD AUTO: 4.57 K/UL
NEUTROPHILS NFR BLD AUTO: 55.1 %
PLATELET # BLD AUTO: 242 K/UL
POTASSIUM SERPL-SCNC: 4.5 MMOL/L
PROT SERPL-MCNC: 6.6 G/DL
PSA SERPL-MCNC: 0.17 NG/ML
RBC # BLD: 4.53 M/UL
RBC # FLD: 16.6 %
SODIUM SERPL-SCNC: 141 MMOL/L
T3 SERPL-MCNC: 96 NG/DL
T3FREE SERPL-MCNC: 2.89 PG/ML
T4 FREE SERPL-MCNC: 1.4 NG/DL
T4 SERPL-MCNC: 6.2 UG/DL
TRIGL SERPL-MCNC: 97 MG/DL
TSH SERPL-ACNC: 1.32 UIU/ML
WBC # FLD AUTO: 8.29 K/UL

## 2019-09-03 ENCOUNTER — APPOINTMENT (OUTPATIENT)
Dept: HEART AND VASCULAR | Facility: CLINIC | Age: 69
End: 2019-09-03

## 2019-09-09 ENCOUNTER — RX RENEWAL (OUTPATIENT)
Age: 69
End: 2019-09-09

## 2019-09-11 ENCOUNTER — APPOINTMENT (OUTPATIENT)
Dept: HEART AND VASCULAR | Facility: CLINIC | Age: 69
End: 2019-09-11
Payer: MEDICARE

## 2019-09-11 ENCOUNTER — NON-APPOINTMENT (OUTPATIENT)
Age: 69
End: 2019-09-11

## 2019-09-11 VITALS
BODY MASS INDEX: 33.32 KG/M2 | HEART RATE: 84 BPM | WEIGHT: 200 LBS | DIASTOLIC BLOOD PRESSURE: 72 MMHG | HEIGHT: 65 IN | SYSTOLIC BLOOD PRESSURE: 122 MMHG | RESPIRATION RATE: 14 BRPM

## 2019-09-11 DIAGNOSIS — Z00.00 ENCOUNTER FOR GENERAL ADULT MEDICAL EXAMINATION W/OUT ABNORMAL FINDINGS: ICD-10-CM

## 2019-09-11 DIAGNOSIS — M54.41 LUMBAGO WITH SCIATICA, LEFT SIDE: ICD-10-CM

## 2019-09-11 DIAGNOSIS — Z87.39 PERSONAL HISTORY OF OTHER DISEASES OF THE MUSCULOSKELETAL SYSTEM AND CONNECTIVE TISSUE: ICD-10-CM

## 2019-09-11 DIAGNOSIS — M54.42 LUMBAGO WITH SCIATICA, LEFT SIDE: ICD-10-CM

## 2019-09-11 PROCEDURE — 93000 ELECTROCARDIOGRAM COMPLETE: CPT

## 2019-09-11 PROCEDURE — 99214 OFFICE O/P EST MOD 30 MIN: CPT

## 2019-09-11 NOTE — DISCUSSION/SUMMARY
[Coronary Artery Disease] : coronary artery disease [Dietary Modification] : dietary modification [Weight Reduction] : weight reduction [Stable] : stable [Hypertension] : hypertension [None] : none [Weight Loss] : weight loss [Smoking Cessation] : smoking cessation [Sodium Restriction] : sodium restriction [Patient] : the patient [FreeTextEntry1] : Cardiac murmur- Stable; no further intervention at this time (see echo).\par Prediabetes- blood work reviewed with patient. Maintain a low caloric, low sodium, low cholesterol  diet. Dietary counseling given, diet and exercise discussed in detail, weight loss recommended.\par Smoking cessation discussed in detail; plan outlined; nicotine patches prescribed.

## 2019-09-11 NOTE — HISTORY OF PRESENT ILLNESS
[FreeTextEntry1] : Denies Chest Pain, SOB, Dizziness, Leg edema, Orthopnea, PND, Palpitations, Syncope, KRISHNA, Diaphoresis.\par Patient denies change in appetite, fatigue, heat or cold intolerance, myalgias, polydypsia, polyphagia, polyuria, tingling, numbness.\par

## 2019-09-11 NOTE — PHYSICAL EXAM
[General Appearance - Well Developed] : well developed [Normal Appearance] : normal appearance [Well Groomed] : well groomed [General Appearance - Well Nourished] : well nourished [No Deformities] : no deformities [General Appearance - In No Acute Distress] : no acute distress [Normal Conjunctiva] : the conjunctiva exhibited no abnormalities [Eyelids - No Xanthelasma] : the eyelids demonstrated no xanthelasmas [Normal Oral Mucosa] : normal oral mucosa [No Oral Pallor] : no oral pallor [No Oral Cyanosis] : no oral cyanosis [Normal Jugular Venous V Waves Present] : normal jugular venous V waves present [Normal Jugular Venous A Waves Present] : normal jugular venous A waves present [No Jugular Venous Valenzuela A Waves] : no jugular venous valenzuela A waves [Respiration, Rhythm And Depth] : normal respiratory rhythm and effort [Exaggerated Use Of Accessory Muscles For Inspiration] : no accessory muscle use [Auscultation Breath Sounds / Voice Sounds] : lungs were clear to auscultation bilaterally [Heart Rate And Rhythm] : heart rate and rhythm were normal [Heart Sounds] : normal S1 and S2 [Systolic grade ___/6] : A grade [unfilled]/6 systolic murmur was heard. [Abdomen Soft] : soft [Abdomen Tenderness] : non-tender [Abdomen Mass (___ Cm)] : no abdominal mass palpated [Abnormal Walk] : normal gait [Cyanosis, Localized] : no localized cyanosis [Nail Clubbing] : no clubbing of the fingernails [Petechial Hemorrhages (___cm)] : no petechial hemorrhages [Skin Color & Pigmentation] : normal skin color and pigmentation [] : no rash [No Venous Stasis] : no venous stasis [Skin Lesions] : no skin lesions [No Xanthoma] : no  xanthoma was observed [No Skin Ulcers] : no skin ulcer [Oriented To Time, Place, And Person] : oriented to person, place, and time [Mood] : the mood was normal [Affect] : the affect was normal [No Anxiety] : not feeling anxious

## 2019-09-11 NOTE — REASON FOR VISIT
[Follow-Up - Clinic] : a clinic follow-up of [Coronary Artery Disease] : coronary artery disease [CABG Follow-up] : bypass graft [Hypertension] : hypertension [FreeTextEntry1] : cardiac murmur and prediabetes

## 2019-10-15 ENCOUNTER — APPOINTMENT (OUTPATIENT)
Dept: VASCULAR SURGERY | Facility: CLINIC | Age: 69
End: 2019-10-15

## 2019-12-09 ENCOUNTER — APPOINTMENT (OUTPATIENT)
Dept: HEART AND VASCULAR | Facility: CLINIC | Age: 69
End: 2019-12-09

## 2020-01-07 ENCOUNTER — APPOINTMENT (OUTPATIENT)
Dept: VASCULAR SURGERY | Facility: CLINIC | Age: 70
End: 2020-01-07
Payer: MEDICARE

## 2020-01-07 PROCEDURE — 93978 VASCULAR STUDY: CPT

## 2020-01-07 PROCEDURE — 99213 OFFICE O/P EST LOW 20 MIN: CPT

## 2020-01-09 NOTE — ASSESSMENT
[FreeTextEntry1] : 65yoM s/p R KERRI PTA/stent and L SFA/PTA PTA stent for moderate-severe claudication b/l, returning for surveillance duplex of the legs and evaluation of an asymptomatic AAA, measured at 4.5x4.5cm today on duplex.\par \par Pt instructed to return to office in 6mos for duplex to reevaluate his aneurysm sac size.  Contact info provided to pt for Dr. Luis Norwood (Neuro-spine) for evaluation of his chronic radiculopathy.\par \par I personally discussed this patient with the Physician Assistant at the time of the visit. I agree with the assessment and plan as written

## 2020-01-09 NOTE — PROCEDURE
[FreeTextEntry1] : Duplex reveals widely patent R KERRI stents and AAA measuring 4.5x4.5cm and stable

## 2020-01-09 NOTE — HISTORY OF PRESENT ILLNESS
[FreeTextEntry1] : 65yoM previously seen for previous moderate-severe R thigh claudication and asymptomatic AAA not yet requiring repair, now s/p R KERRI PTA/stent and L SFA/PTA PTA stent, returns for surveillance duplex of R KERRI.\par \par Pt reports his b/l anterior thighs have been tight and painful since his spine surgery w/Dr. Alvarez last year, w/some improvement after spinal injections w/his pain management doc.  He denies any lower leg pain w/activity, though he is limited by his back pain.  Pt is looking to meet w/another neuro-spine sx for evaluation.\par \par Pt denies rest pain in the legs/feet.

## 2020-01-09 NOTE — DATA REVIEWED
Change of shift bedside report received [facility] G[facility] Elias[facility] [FreeTextEntry1] : Previous duplex studies reviewed

## 2020-01-09 NOTE — PHYSICAL EXAM
[Normal Thyroid] : the thyroid was normal [Normal Breath Sounds] : Normal breath sounds [Normal Rate and Rhythm] : normal rate and rhythm [1+] : right 1+ [2+] : left 2+ [Ankle Swelling (On Exam)] : present [Ankle Swelling Bilaterally] : bilaterally  [Ankle Swelling On The Right] : mild [No Rash or Lesion] : No rash or lesion [Calm] : calm [Alert] : alert [Varicose Veins Of Lower Extremities] : not present [JVD] : no jugular venous distention  [de-identified] : well-nourished, NAD [] : not present [de-identified] : NC/AT, anicteric [de-identified] : FROM throughout, strength 5/5x4, no palpable cords [de-identified] : SNTND

## 2020-01-14 ENCOUNTER — RX RENEWAL (OUTPATIENT)
Age: 70
End: 2020-01-14

## 2020-01-16 ENCOUNTER — APPOINTMENT (OUTPATIENT)
Dept: HEART AND VASCULAR | Facility: CLINIC | Age: 70
End: 2020-01-16
Payer: MEDICARE

## 2020-01-16 VITALS — DIASTOLIC BLOOD PRESSURE: 72 MMHG | SYSTOLIC BLOOD PRESSURE: 125 MMHG

## 2020-01-16 PROCEDURE — 36415 COLL VENOUS BLD VENIPUNCTURE: CPT

## 2020-01-20 LAB
ALBUMIN SERPL ELPH-MCNC: 4.5 G/DL
ALP BLD-CCNC: 58 U/L
ALT SERPL-CCNC: 10 U/L
ANION GAP SERPL CALC-SCNC: 13 MMOL/L
AST SERPL-CCNC: 15 U/L
BASOPHILS # BLD AUTO: 0.05 K/UL
BASOPHILS NFR BLD AUTO: 0.7 %
BILIRUB SERPL-MCNC: 0.6 MG/DL
BUN SERPL-MCNC: 15 MG/DL
CALCIUM SERPL-MCNC: 9.6 MG/DL
CHLORIDE SERPL-SCNC: 103 MMOL/L
CHOLEST SERPL-MCNC: 152 MG/DL
CHOLEST/HDLC SERPL: 2.9 RATIO
CO2 SERPL-SCNC: 24 MMOL/L
CREAT SERPL-MCNC: 0.99 MG/DL
EOSINOPHIL # BLD AUTO: 0.19 K/UL
EOSINOPHIL NFR BLD AUTO: 2.7 %
ESTIMATED AVERAGE GLUCOSE: 117 MG/DL
GLUCOSE SERPL-MCNC: 88 MG/DL
HBA1C MFR BLD HPLC: 5.7 %
HCT VFR BLD CALC: 43.1 %
HDLC SERPL-MCNC: 52 MG/DL
HGB BLD-MCNC: 13.2 G/DL
IMM GRANULOCYTES NFR BLD AUTO: 0.4 %
LDLC SERPL CALC-MCNC: 81 MG/DL
LYMPHOCYTES # BLD AUTO: 2.26 K/UL
LYMPHOCYTES NFR BLD AUTO: 32.6 %
MAN DIFF?: NORMAL
MCHC RBC-ENTMCNC: 28.4 PG
MCHC RBC-ENTMCNC: 30.6 GM/DL
MCV RBC AUTO: 92.7 FL
MONOCYTES # BLD AUTO: 0.65 K/UL
MONOCYTES NFR BLD AUTO: 9.4 %
NEUTROPHILS # BLD AUTO: 3.75 K/UL
NEUTROPHILS NFR BLD AUTO: 54.2 %
PLATELET # BLD AUTO: 204 K/UL
POTASSIUM SERPL-SCNC: 4.7 MMOL/L
PROT SERPL-MCNC: 6.8 G/DL
PSA FREE FLD-MCNC: NORMAL %
PSA FREE SERPL-MCNC: <0.01 NG/ML
PSA SERPL-MCNC: 0.16 NG/ML
RBC # BLD: 4.65 M/UL
RBC # FLD: 15.5 %
SODIUM SERPL-SCNC: 141 MMOL/L
T3 SERPL-MCNC: 90 NG/DL
T3FREE SERPL-MCNC: 2.86 PG/ML
T4 FREE SERPL-MCNC: 1.3 NG/DL
T4 SERPL-MCNC: 6.7 UG/DL
TRIGL SERPL-MCNC: 92 MG/DL
TSH SERPL-ACNC: 1.5 UIU/ML
WBC # FLD AUTO: 6.93 K/UL

## 2020-01-27 ENCOUNTER — APPOINTMENT (OUTPATIENT)
Dept: HEART AND VASCULAR | Facility: CLINIC | Age: 70
End: 2020-01-27
Payer: MEDICARE

## 2020-01-27 ENCOUNTER — NON-APPOINTMENT (OUTPATIENT)
Age: 70
End: 2020-01-27

## 2020-01-27 VITALS
SYSTOLIC BLOOD PRESSURE: 130 MMHG | DIASTOLIC BLOOD PRESSURE: 70 MMHG | HEIGHT: 65 IN | HEART RATE: 89 BPM | RESPIRATION RATE: 14 BRPM | WEIGHT: 202 LBS | BODY MASS INDEX: 33.66 KG/M2

## 2020-01-27 PROCEDURE — 93000 ELECTROCARDIOGRAM COMPLETE: CPT

## 2020-01-27 PROCEDURE — 99214 OFFICE O/P EST MOD 30 MIN: CPT

## 2020-01-27 NOTE — DISCUSSION/SUMMARY
[Coronary Artery Disease] : coronary artery disease [Dietary Modification] : dietary modification [Smoking Cessation] : smoking cessation [Weight Reduction] : weight reduction [Hypertension] : hypertension [Stable] : stable [None] : none [Sodium Restriction] : sodium restriction [Patient] : the patient [FreeTextEntry1] : Blood work reviewed with pt. Maintain a low caloric, low sodium, low cholesterol  diet. Dietary counseling given, diet and exercise discussed in detail.

## 2020-01-27 NOTE — HISTORY OF PRESENT ILLNESS
[FreeTextEntry1] : Denies Chest Pain, SOB, Dizziness, Leg edema, Orthopnea, PND, Palpitations, Syncope, KRISHNA, Diaphoresis.\par

## 2020-02-03 NOTE — PRE-OP CHECKLIST - BOWEL PREP
mellitus, type 2) ([facility] [address]     Hyperlipidemia     Hypertension     Neuropathy     B/L UPPER AND LOWER EXTREMITY    Skin abscess     Tobacco abuse        Past Surgical History:   Procedure Laterality Date    ELBOW SURGERY Left     Nerve release     FINGER SURGERY Left     INCISION AND DRAINAGE OF NECK ABSCESS  10/2016    KNEE SURGERY Left     Scope        Prior to Admission medications    Medication Sig Start Date End Date Taking? Authorizing Provider   omeprazole (PRILOSEC) 40 MG delayed release capsule Take 40 mg by mouth daily   Yes Historical Provider, MD   Insulin Degludec (TRESIBA FLEXTOUCH) 200 UNIT/ML SOPN Inject 100 mLs into the skin every morning   Yes Historical Provider, MD   pregabalin (LYRICA) 100 MG capsule Take 100 mg by mouth 3 times daily. Yes Historical Provider, MD   TRUE METRIX BLOOD GLUCOSE TEST strip TEST TWICE DAILY 3/20/18  Yes Historical Provider, MD   NOVOLOG FLEXPEN 100 UNIT/ML injection pen INJECT 8 UNITS UNDER THE SKIN 3 TIMES A DAY BEFORE MEALS 3/14/18  Yes Historical Provider, MD   SURE COMFORT PEN NEEDLES 32G X 4 MM MISC USE ONCE DAILY 3/20/18  Yes Historical Provider, MD       No Known Allergies    Social History     Socioeconomic History    Marital status: Single     Spouse name: Not on file    Number of children: Not on file    Years of education: Not on file    Highest education level: Not on file   Occupational History    Occupation: unemployed     Employer: DISABLED   Social Needs    Financial resource strain: Not on file    Food insecurity:     Worry: Not on file     Inability: Not on file    Transportation needs:     Medical: Not on file     Non-medical: Not on file   Tobacco Use    Smoking status: Current Every Day Smoker     Packs/day: 2.00     Years: 35.00     Pack years: 70.00     Types: Cigarettes    Smokeless tobacco: Never Used   Substance and Sexual Activity    Alcohol use:  Yes     Alcohol/week: 7.0 standard drinks     Types: 7 Cans of beer per week negative  Range of motion:Normal flexion, extension, rotation bilaterally and is not painful. Spurling's: negative bilaterally    Thoracic spine:     Spine inspection:normal   Palpation:No tenderness over the midline and paraspinal area, bilaterally  Range of motion:normal in flexion, extension rotation bilateral and is not painful. Lumbar spine:    Spine inspection: Normal   Palpation: Tenderness paravertebral muscles Yes bilaterally  Range of motion: Decreased, flexion Decreased, Lateral bending, extension and rotation bilaterally reduced is somewhat painful. Sacroiliac joint tenderness No bilaterally  SAMUEL test: negative bilaterally  Gaenslen's test:negative bilaterally   Piriformis tenderness: negative bilaterally  SLR : negative bilaterally  Trochanteric bursa tenderness: negative bilaterally  CVA tenderness:No     Musculoskeletal:    Trigger points in trapezius: No bilaterally  Trigger points in rhomboids: No bilaterally  Trigger points in Paravertebral: No bilaterally    Extremities:    No edema    Knee:    ROM : no pan   Joint line tenderness : no    Neurological:    Sensory: Normal to light touch - except for diminished sensation over the distal lower extremities and distal upper extremities. Motor:   No focal deficits except for mild weakness of the left hand . (Apparently has been chronic following left UE surgery in 2017)    Gait:normal Yes    Dermatology:    Skin:no rashes or lesions noted    Assessment/Plan:     Diagnosis Orders   1. Diabetic polyneuropathy associated with other specified diabetes mellitus ([address]     2. Neuralgia and neuritis     3. Obesity, unspecified classification, unspecified obesity type, unspecified whether serious comorbidity present     4. Smoking     5. DDD (degenerative disc disease), lumbar     6. Medical marijuana use         46 y.o. male with H/O diabetes and having neuropathy with bilateral distal lower extremity and distal upper extremity pain.     He has treatment plan.     Luan Ng MD    CC:    Pepe Kaur, DPM  [address] [place] [id number] n/a

## 2020-02-04 ENCOUNTER — APPOINTMENT (OUTPATIENT)
Dept: HEART AND VASCULAR | Facility: CLINIC | Age: 70
End: 2020-02-04
Payer: MEDICARE

## 2020-02-04 VITALS
HEART RATE: 77 BPM | WEIGHT: 205 LBS | HEIGHT: 65 IN | DIASTOLIC BLOOD PRESSURE: 80 MMHG | BODY MASS INDEX: 34.16 KG/M2 | SYSTOLIC BLOOD PRESSURE: 156 MMHG

## 2020-02-04 PROCEDURE — 96374 THER/PROPH/DIAG INJ IV PUSH: CPT | Mod: 59

## 2020-02-04 PROCEDURE — 93015 CV STRESS TEST SUPVJ I&R: CPT

## 2020-02-04 PROCEDURE — 78452 HT MUSCLE IMAGE SPECT MULT: CPT

## 2020-02-04 PROCEDURE — A9500: CPT

## 2020-02-04 NOTE — PHYSICAL EXAM
[Normal Appearance] : normal appearance [Well Groomed] : well groomed [General Appearance - Well Developed] : well developed [General Appearance - Well Nourished] : well nourished [No Deformities] : no deformities [Normal Conjunctiva] : the conjunctiva exhibited no abnormalities [General Appearance - In No Acute Distress] : no acute distress [Eyelids - No Xanthelasma] : the eyelids demonstrated no xanthelasmas [No Oral Pallor] : no oral pallor [Normal Oral Mucosa] : normal oral mucosa [Normal Jugular Venous A Waves Present] : normal jugular venous A waves present [No Oral Cyanosis] : no oral cyanosis [Normal Jugular Venous V Waves Present] : normal jugular venous V waves present [] : no respiratory distress [No Jugular Venous Valenzuela A Waves] : no jugular venous valenzuela A waves [Exaggerated Use Of Accessory Muscles For Inspiration] : no accessory muscle use [Respiration, Rhythm And Depth] : normal respiratory rhythm and effort [Auscultation Breath Sounds / Voice Sounds] : lungs were clear to auscultation bilaterally [Heart Sounds] : normal S1 and S2 [Heart Rate And Rhythm] : heart rate and rhythm were normal [Murmurs] : no murmurs present

## 2020-02-18 ENCOUNTER — APPOINTMENT (OUTPATIENT)
Dept: HEART AND VASCULAR | Facility: CLINIC | Age: 70
End: 2020-02-18
Payer: MEDICARE

## 2020-02-18 ENCOUNTER — LABORATORY RESULT (OUTPATIENT)
Age: 70
End: 2020-02-18

## 2020-02-18 VITALS
BODY MASS INDEX: 34.66 KG/M2 | WEIGHT: 208 LBS | TEMPERATURE: 97.5 F | HEIGHT: 65 IN | DIASTOLIC BLOOD PRESSURE: 78 MMHG | SYSTOLIC BLOOD PRESSURE: 144 MMHG | RESPIRATION RATE: 16 BRPM

## 2020-02-18 DIAGNOSIS — J44.9 CHRONIC OBSTRUCTIVE PULMONARY DISEASE, UNSPECIFIED: ICD-10-CM

## 2020-02-18 PROCEDURE — 36415 COLL VENOUS BLD VENIPUNCTURE: CPT

## 2020-02-18 PROCEDURE — 99214 OFFICE O/P EST MOD 30 MIN: CPT

## 2020-02-18 NOTE — PHYSICAL EXAM
[Normal Appearance] : normal appearance [General Appearance - Well Developed] : well developed [General Appearance - Well Nourished] : well nourished [Well Groomed] : well groomed [No Deformities] : no deformities [General Appearance - In No Acute Distress] : no acute distress [Normal Conjunctiva] : the conjunctiva exhibited no abnormalities [Eyelids - No Xanthelasma] : the eyelids demonstrated no xanthelasmas [Normal Jugular Venous A Waves Present] : normal jugular venous A waves present [Normal Jugular Venous V Waves Present] : normal jugular venous V waves present [No Jugular Venous Valenzuela A Waves] : no jugular venous valenzuela A waves [Respiration, Rhythm And Depth] : normal respiratory rhythm and effort [Exaggerated Use Of Accessory Muscles For Inspiration] : no accessory muscle use [Auscultation Breath Sounds / Voice Sounds] : lungs were clear to auscultation bilaterally [Heart Rate And Rhythm] : heart rate and rhythm were normal [Heart Sounds] : normal S1 and S2 [Systolic grade ___/6] : A grade [unfilled]/6 systolic murmur was heard. [Abdomen Soft] : soft [Abdomen Tenderness] : non-tender [Abdomen Mass (___ Cm)] : no abdominal mass palpated [Abnormal Walk] : normal gait [Nail Clubbing] : no clubbing of the fingernails [Cyanosis, Localized] : no localized cyanosis [Petechial Hemorrhages (___cm)] : no petechial hemorrhages [Skin Color & Pigmentation] : normal skin color and pigmentation [No Venous Stasis] : no venous stasis [] : no rash [Skin Lesions] : no skin lesions [No Xanthoma] : no  xanthoma was observed [No Skin Ulcers] : no skin ulcer [Oriented To Time, Place, And Person] : oriented to person, place, and time [Mood] : the mood was normal [Affect] : the affect was normal [No Anxiety] : not feeling anxious [FreeTextEntry1] : PERRLA, EOMI, clear conjunctiva, (+)pharyngeal erythema, no tonsilar exudate, no JVD, nonicteric\par

## 2020-02-18 NOTE — HISTORY OF PRESENT ILLNESS
[FreeTextEntry1] : The patient presents with nasal congestion, rhinorrhea (clear), sore throat (scratchy, worse with cough), cough (copious sputum, no hemoptysis), c/o headache, c/o fever (subjective) starting 3 days prior to the visit. Pt. went to [facility] ER 3d ago ; was d/c'd from ED with Rx for steroids but still is c/o sx.'s as above. Pt. came here for treatment.

## 2020-02-19 LAB
ALBUMIN SERPL ELPH-MCNC: 4.1 G/DL
ALP BLD-CCNC: 57 U/L
ALT SERPL-CCNC: 17 U/L
ANION GAP SERPL CALC-SCNC: 13 MMOL/L
AST SERPL-CCNC: 22 U/L
BASOPHILS # BLD AUTO: 0.06 K/UL
BASOPHILS NFR BLD AUTO: 0.8 %
BILIRUB SERPL-MCNC: 0.3 MG/DL
BUN SERPL-MCNC: 17 MG/DL
CALCIUM SERPL-MCNC: 9.4 MG/DL
CHLORIDE SERPL-SCNC: 103 MMOL/L
CO2 SERPL-SCNC: 23 MMOL/L
CREAT SERPL-MCNC: 0.85 MG/DL
EOSINOPHIL # BLD AUTO: 0 K/UL
EOSINOPHIL NFR BLD AUTO: 0 %
GLUCOSE SERPL-MCNC: 134 MG/DL
HCT VFR BLD CALC: 39.8 %
HGB BLD-MCNC: 12.4 G/DL
LYMPHOCYTES # BLD AUTO: 0.7 K/UL
LYMPHOCYTES NFR BLD AUTO: 9.3 %
MAN DIFF?: NORMAL
MCHC RBC-ENTMCNC: 28.4 PG
MCHC RBC-ENTMCNC: 31.2 GM/DL
MCV RBC AUTO: 91.3 FL
MONOCYTES # BLD AUTO: 0.38 K/UL
MONOCYTES NFR BLD AUTO: 5 %
NEUTROPHILS # BLD AUTO: 6.31 K/UL
NEUTROPHILS NFR BLD AUTO: 83.2 %
PLATELET # BLD AUTO: 208 K/UL
POTASSIUM SERPL-SCNC: 4.6 MMOL/L
PROT SERPL-MCNC: 6.6 G/DL
RBC # BLD: 4.36 M/UL
RBC # FLD: 15.5 %
SODIUM SERPL-SCNC: 138 MMOL/L
WBC # FLD AUTO: 7.58 K/UL

## 2020-03-02 ENCOUNTER — APPOINTMENT (OUTPATIENT)
Dept: HEART AND VASCULAR | Facility: CLINIC | Age: 70
End: 2020-03-02
Payer: MEDICARE

## 2020-03-02 ENCOUNTER — NON-APPOINTMENT (OUTPATIENT)
Age: 70
End: 2020-03-02

## 2020-03-02 VITALS
RESPIRATION RATE: 14 BRPM | BODY MASS INDEX: 34.16 KG/M2 | SYSTOLIC BLOOD PRESSURE: 130 MMHG | HEIGHT: 65 IN | HEART RATE: 88 BPM | DIASTOLIC BLOOD PRESSURE: 80 MMHG | WEIGHT: 205 LBS

## 2020-03-02 DIAGNOSIS — J06.9 ACUTE UPPER RESPIRATORY INFECTION, UNSPECIFIED: ICD-10-CM

## 2020-03-02 PROCEDURE — 93000 ELECTROCARDIOGRAM COMPLETE: CPT

## 2020-03-02 PROCEDURE — 99214 OFFICE O/P EST MOD 30 MIN: CPT

## 2020-03-02 NOTE — DISCUSSION/SUMMARY
[Coronary Artery Disease] : coronary artery disease [Smoking Cessation] : smoking cessation [Dietary Modification] : dietary modification [Exercise Regimen] : an exercise regimen [Hypertension] : hypertension [Stable] : stable [None] : none [Patient] : the patient [Sodium Restriction] : sodium restriction [FreeTextEntry1] : Cardiac murmur- Stable; no further intervention at this time (see last echo).\par Blood work reviewed with pt. Maintain a low caloric, low sodium, low cholesterol  diet. Dietary counseling given, diet and exercise discussed in detail.

## 2020-03-02 NOTE — PHYSICAL EXAM
[General Appearance - Well Developed] : well developed [Normal Appearance] : normal appearance [Well Groomed] : well groomed [General Appearance - Well Nourished] : well nourished [General Appearance - In No Acute Distress] : no acute distress [No Deformities] : no deformities [Normal Conjunctiva] : the conjunctiva exhibited no abnormalities [Eyelids - No Xanthelasma] : the eyelids demonstrated no xanthelasmas [FreeTextEntry1] : PERRLA, EOMI, clear conjunctiva, (+)pharyngeal erythema, no tonsilar exudate, no JVD, nonicteric\par  [Normal Jugular Venous A Waves Present] : normal jugular venous A waves present [No Jugular Venous Valenzuela A Waves] : no jugular venous valenzuela A waves [Normal Jugular Venous V Waves Present] : normal jugular venous V waves present [Respiration, Rhythm And Depth] : normal respiratory rhythm and effort [Auscultation Breath Sounds / Voice Sounds] : lungs were clear to auscultation bilaterally [Exaggerated Use Of Accessory Muscles For Inspiration] : no accessory muscle use [Heart Rate And Rhythm] : heart rate and rhythm were normal [Heart Sounds] : normal S1 and S2 [Systolic grade ___/6] : A grade [unfilled]/6 systolic murmur was heard. [Abdomen Soft] : soft [Abdomen Tenderness] : non-tender [Abdomen Mass (___ Cm)] : no abdominal mass palpated [Abnormal Walk] : normal gait [Nail Clubbing] : no clubbing of the fingernails [Cyanosis, Localized] : no localized cyanosis [Petechial Hemorrhages (___cm)] : no petechial hemorrhages [Skin Color & Pigmentation] : normal skin color and pigmentation [] : no rash [No Venous Stasis] : no venous stasis [Skin Lesions] : no skin lesions [No Skin Ulcers] : no skin ulcer [No Xanthoma] : no  xanthoma was observed [Oriented To Time, Place, And Person] : oriented to person, place, and time [Affect] : the affect was normal [Mood] : the mood was normal [No Anxiety] : not feeling anxious

## 2020-03-02 NOTE — REASON FOR VISIT
[Coronary Artery Disease] : coronary artery disease [Follow-Up - Clinic] : a clinic follow-up of [CABG Follow-up] : bypass graft [Hypertension] : hypertension [FreeTextEntry1] : cardiac murmur

## 2020-03-06 ENCOUNTER — APPOINTMENT (OUTPATIENT)
Dept: HEART AND VASCULAR | Facility: CLINIC | Age: 70
End: 2020-03-06
Payer: MEDICARE

## 2020-03-06 ENCOUNTER — NON-APPOINTMENT (OUTPATIENT)
Age: 70
End: 2020-03-06

## 2020-03-06 VITALS
SYSTOLIC BLOOD PRESSURE: 130 MMHG | DIASTOLIC BLOOD PRESSURE: 72 MMHG | BODY MASS INDEX: 34.16 KG/M2 | HEART RATE: 90 BPM | WEIGHT: 205 LBS | RESPIRATION RATE: 14 BRPM | HEIGHT: 65 IN

## 2020-03-06 PROCEDURE — 93000 ELECTROCARDIOGRAM COMPLETE: CPT | Mod: NC

## 2020-03-06 PROCEDURE — 36415 COLL VENOUS BLD VENIPUNCTURE: CPT

## 2020-03-06 PROCEDURE — 99214 OFFICE O/P EST MOD 30 MIN: CPT

## 2020-03-06 NOTE — DISCUSSION/SUMMARY
[Procedure Intermediate Risk] : the procedure risk is intermediate [Patient Intermediate Risk] : the patient is an intermediate risk [Optimized for Surgery Pending Laboratory Results] : the patient is optimized for surgery pending laboratory results [As per surgery] : as per surgery [Continue] : Continue medications as currently directed [FreeTextEntry1] : Pt. is cleared for planned  procedure pending results of pre-op blood work and Cxr requested by surgeon.

## 2020-03-06 NOTE — HISTORY OF PRESENT ILLNESS
[Preoperative Visit] : for a medical evaluation prior to surgery [Scheduled Procedure ___] : a [unfilled] [Surgeon Name ___] : surgeon: [unfilled] [Good] : Good [Cardiovascular Disease] : cardiovascular disease [Pulmonary Disease] : pulmonary disease [Nicotine Dependence] : nicotine dependence [Prior Anesthesia] : Prior anesthesia [Fever] : no fever [Chills] : no chills [Fatigue] : no fatigue [Chest Pain] : no chest pain [Cough] : no cough [Dyspnea] : no dyspnea [Dysuria] : no dysuria [Urinary Frequency] : no urinary frequency [Nausea] : no nausea [Vomiting] : no vomiting [Diarrhea] : no diarrhea [Abdominal Pain] : no abdominal pain [Easy Bruising] : no easy bruising [Lower Extremity Swelling] : no lower extremity swelling [Poor Exercise Tolerance] : no poor exercise tolerance [Diabetes] : no diabetes [Renal Disease] : no renal disease [GI Disease] : no gastrointestinal disease [Sleep Apnea] : no sleep apnea [Clotting Disorder] : no clotting disorder [Bleeding Disorder] : no bleeding disorder [Transfusion Reaction] : no transfusion reaction [Impaired Immunity] : no impaired immunity [Prev Anesthesia Reaction] : no previous anesthesia reaction

## 2020-03-06 NOTE — PHYSICAL EXAM
[General Appearance - Well Developed] : well developed [Normal Appearance] : normal appearance [Well Groomed] : well groomed [General Appearance - Well Nourished] : well nourished [No Deformities] : no deformities [General Appearance - In No Acute Distress] : no acute distress [Normal Conjunctiva] : the conjunctiva exhibited no abnormalities [Eyelids - No Xanthelasma] : the eyelids demonstrated no xanthelasmas [Normal Jugular Venous A Waves Present] : normal jugular venous A waves present [Normal Jugular Venous V Waves Present] : normal jugular venous V waves present [No Jugular Venous Valenzuela A Waves] : no jugular venous valenzuela A waves [Respiration, Rhythm And Depth] : normal respiratory rhythm and effort [Exaggerated Use Of Accessory Muscles For Inspiration] : no accessory muscle use [Auscultation Breath Sounds / Voice Sounds] : lungs were clear to auscultation bilaterally [Heart Rate And Rhythm] : heart rate and rhythm were normal [Heart Sounds] : normal S1 and S2 [Systolic grade ___/6] : A grade [unfilled]/6 systolic murmur was heard. [Abdomen Soft] : soft [Abdomen Tenderness] : non-tender [Abdomen Mass (___ Cm)] : no abdominal mass palpated [Abnormal Walk] : normal gait [Nail Clubbing] : no clubbing of the fingernails [Cyanosis, Localized] : no localized cyanosis [Petechial Hemorrhages (___cm)] : no petechial hemorrhages [Skin Color & Pigmentation] : normal skin color and pigmentation [] : no rash [No Venous Stasis] : no venous stasis [Skin Lesions] : no skin lesions [No Skin Ulcers] : no skin ulcer [No Xanthoma] : no  xanthoma was observed [Oriented To Time, Place, And Person] : oriented to person, place, and time [Affect] : the affect was normal [Mood] : the mood was normal [No Anxiety] : not feeling anxious [FreeTextEntry1] : PERRLA, EOMI, clear conjunctiva, (+)pharyngeal erythema, no tonsilar exudate, no JVD, nonicteric\par

## 2020-03-09 LAB
ALBUMIN SERPL ELPH-MCNC: 4.2 G/DL
ALP BLD-CCNC: 55 U/L
ALT SERPL-CCNC: 16 U/L
ANION GAP SERPL CALC-SCNC: 13 MMOL/L
APTT BLD: 31 SEC
AST SERPL-CCNC: 17 U/L
BASOPHILS # BLD AUTO: 0.04 K/UL
BASOPHILS NFR BLD AUTO: 0.6 %
BILIRUB SERPL-MCNC: 0.6 MG/DL
BUN SERPL-MCNC: 17 MG/DL
CALCIUM SERPL-MCNC: 9.7 MG/DL
CHLORIDE SERPL-SCNC: 103 MMOL/L
CO2 SERPL-SCNC: 24 MMOL/L
CREAT SERPL-MCNC: 0.94 MG/DL
EOSINOPHIL # BLD AUTO: 0.14 K/UL
EOSINOPHIL NFR BLD AUTO: 2.3 %
GLUCOSE SERPL-MCNC: 94 MG/DL
HCT VFR BLD CALC: 39.1 %
HGB BLD-MCNC: 12 G/DL
IMM GRANULOCYTES NFR BLD AUTO: 0.3 %
INR PPP: 1.12 RATIO
LYMPHOCYTES # BLD AUTO: 1.86 K/UL
LYMPHOCYTES NFR BLD AUTO: 30 %
MAN DIFF?: NORMAL
MCHC RBC-ENTMCNC: 28.4 PG
MCHC RBC-ENTMCNC: 30.7 GM/DL
MCV RBC AUTO: 92.4 FL
MONOCYTES # BLD AUTO: 0.66 K/UL
MONOCYTES NFR BLD AUTO: 10.6 %
NEUTROPHILS # BLD AUTO: 3.48 K/UL
NEUTROPHILS NFR BLD AUTO: 56.2 %
PLATELET # BLD AUTO: 194 K/UL
POTASSIUM SERPL-SCNC: 4.8 MMOL/L
PROT SERPL-MCNC: 6.4 G/DL
PT BLD: 12.9 SEC
RBC # BLD: 4.23 M/UL
RBC # FLD: 16.1 %
SODIUM SERPL-SCNC: 139 MMOL/L
WBC # FLD AUTO: 6.2 K/UL

## 2020-04-09 ENCOUNTER — LABORATORY RESULT (OUTPATIENT)
Age: 70
End: 2020-04-09

## 2020-04-09 ENCOUNTER — APPOINTMENT (OUTPATIENT)
Dept: HEART AND VASCULAR | Facility: CLINIC | Age: 70
End: 2020-04-09
Payer: MEDICARE

## 2020-04-09 VITALS — SYSTOLIC BLOOD PRESSURE: 130 MMHG | DIASTOLIC BLOOD PRESSURE: 70 MMHG

## 2020-04-09 PROCEDURE — 36415 COLL VENOUS BLD VENIPUNCTURE: CPT

## 2020-04-13 ENCOUNTER — NON-APPOINTMENT (OUTPATIENT)
Age: 70
End: 2020-04-13

## 2020-04-13 ENCOUNTER — APPOINTMENT (OUTPATIENT)
Dept: HEART AND VASCULAR | Facility: CLINIC | Age: 70
End: 2020-04-13
Payer: MEDICARE

## 2020-04-13 VITALS
HEIGHT: 65 IN | DIASTOLIC BLOOD PRESSURE: 80 MMHG | SYSTOLIC BLOOD PRESSURE: 132 MMHG | WEIGHT: 205 LBS | RESPIRATION RATE: 14 BRPM | HEART RATE: 90 BPM | BODY MASS INDEX: 34.16 KG/M2

## 2020-04-13 DIAGNOSIS — B18.2 CHRONIC VIRAL HEPATITIS C: ICD-10-CM

## 2020-04-13 DIAGNOSIS — Z87.898 PERSONAL HISTORY OF OTHER SPECIFIED CONDITIONS: ICD-10-CM

## 2020-04-13 LAB
ALBUMIN SERPL ELPH-MCNC: 4.3 G/DL
ALP BLD-CCNC: 64 U/L
ALT SERPL-CCNC: 12 U/L
ANION GAP SERPL CALC-SCNC: 14 MMOL/L
AST SERPL-CCNC: 15 U/L
BASOPHILS # BLD AUTO: 0.05 K/UL
BASOPHILS NFR BLD AUTO: 0.8 %
BILIRUB SERPL-MCNC: 0.6 MG/DL
BUN SERPL-MCNC: 15 MG/DL
CALCIUM SERPL-MCNC: 9.5 MG/DL
CHLORIDE SERPL-SCNC: 104 MMOL/L
CHOLEST SERPL-MCNC: 127 MG/DL
CHOLEST/HDLC SERPL: 2.9 RATIO
CO2 SERPL-SCNC: 25 MMOL/L
CREAT SERPL-MCNC: 0.94 MG/DL
EOSINOPHIL # BLD AUTO: 0.44 K/UL
EOSINOPHIL NFR BLD AUTO: 6.9 %
ESTIMATED AVERAGE GLUCOSE: 114 MG/DL
GLUCOSE SERPL-MCNC: 93 MG/DL
HBA1C MFR BLD HPLC: 5.6 %
HCT VFR BLD CALC: 41.4 %
HDLC SERPL-MCNC: 44 MG/DL
HGB BLD-MCNC: 12.7 G/DL
IMM GRANULOCYTES NFR BLD AUTO: 0.2 %
LDLC SERPL CALC-MCNC: 69 MG/DL
LYMPHOCYTES # BLD AUTO: 2.3 K/UL
LYMPHOCYTES NFR BLD AUTO: 36.1 %
MAN DIFF?: NORMAL
MCHC RBC-ENTMCNC: 28.2 PG
MCHC RBC-ENTMCNC: 30.7 GM/DL
MCV RBC AUTO: 91.8 FL
MONOCYTES # BLD AUTO: 0.53 K/UL
MONOCYTES NFR BLD AUTO: 8.3 %
NEUTROPHILS # BLD AUTO: 3.05 K/UL
NEUTROPHILS NFR BLD AUTO: 47.7 %
PLATELET # BLD AUTO: 214 K/UL
POTASSIUM SERPL-SCNC: 5 MMOL/L
PROT SERPL-MCNC: 6.7 G/DL
RBC # BLD: 4.51 M/UL
RBC # FLD: 15.9 %
SODIUM SERPL-SCNC: 143 MMOL/L
T3 SERPL-MCNC: 102 NG/DL
T3FREE SERPL-MCNC: 3.14 PG/ML
T3RU NFR SERPL: 1 TBI
T4 FREE SERPL-MCNC: 1.2 NG/DL
T4 SERPL-MCNC: 6.7 UG/DL
TRIGL SERPL-MCNC: 71 MG/DL
TSH SERPL-ACNC: 1.55 UIU/ML
WBC # FLD AUTO: 6.38 K/UL

## 2020-04-13 PROCEDURE — 93000 ELECTROCARDIOGRAM COMPLETE: CPT

## 2020-04-13 PROCEDURE — 99214 OFFICE O/P EST MOD 30 MIN: CPT

## 2020-04-13 NOTE — PHYSICAL EXAM
[General Appearance - Well Developed] : well developed [Normal Appearance] : normal appearance [Well Groomed] : well groomed [General Appearance - Well Nourished] : well nourished [No Deformities] : no deformities [General Appearance - In No Acute Distress] : no acute distress [Normal Conjunctiva] : the conjunctiva exhibited no abnormalities [Eyelids - No Xanthelasma] : the eyelids demonstrated no xanthelasmas [FreeTextEntry1] : PERRLA, EOMI, clear conjunctiva, (+)pharyngeal erythema, no tonsilar exudate, no JVD, nonicteric\par  [Normal Jugular Venous A Waves Present] : normal jugular venous A waves present [Normal Jugular Venous V Waves Present] : normal jugular venous V waves present [No Jugular Venous Valenzuela A Waves] : no jugular venous valenzuela A waves [Respiration, Rhythm And Depth] : normal respiratory rhythm and effort [Exaggerated Use Of Accessory Muscles For Inspiration] : no accessory muscle use [Auscultation Breath Sounds / Voice Sounds] : lungs were clear to auscultation bilaterally [Heart Rate And Rhythm] : heart rate and rhythm were normal [Heart Sounds] : normal S1 and S2 [Systolic grade ___/6] : A grade [unfilled]/6 systolic murmur was heard. [Abdomen Soft] : soft [Abdomen Tenderness] : non-tender [Abdomen Mass (___ Cm)] : no abdominal mass palpated [Abnormal Walk] : normal gait [Nail Clubbing] : no clubbing of the fingernails [Cyanosis, Localized] : no localized cyanosis [Petechial Hemorrhages (___cm)] : no petechial hemorrhages [Skin Color & Pigmentation] : normal skin color and pigmentation [] : no rash [No Venous Stasis] : no venous stasis [Skin Lesions] : no skin lesions [No Skin Ulcers] : no skin ulcer [No Xanthoma] : no  xanthoma was observed [Oriented To Time, Place, And Person] : oriented to person, place, and time [Affect] : the affect was normal [Mood] : the mood was normal [No Anxiety] : not feeling anxious

## 2020-04-13 NOTE — DISCUSSION/SUMMARY
[Coronary Artery Disease] : coronary artery disease [None] : none [Dietary Modification] : dietary modification [Smoking Cessation] : smoking cessation [Hypertension] : hypertension [Stable] : stable [Weight Loss] : weight loss [Sodium Restriction] : sodium restriction [Patient] : the patient [FreeTextEntry1] : Blood work reviewed with pt. Maintain a low caloric, low sodium, low cholesterol  diet. Dietary counseling given, diet and exercise discussed in detail, weight loss recommended.\par Referral for hepatologist given.

## 2020-04-13 NOTE — REASON FOR VISIT
[Follow-Up - Clinic] : a clinic follow-up of [Coronary Artery Disease] : coronary artery disease [CABG Follow-up] : bypass graft [Hypertension] : hypertension

## 2020-05-04 ENCOUNTER — APPOINTMENT (OUTPATIENT)
Dept: HEART AND VASCULAR | Facility: CLINIC | Age: 70
End: 2020-05-04
Payer: MEDICARE

## 2020-05-04 ENCOUNTER — LABORATORY RESULT (OUTPATIENT)
Age: 70
End: 2020-05-04

## 2020-05-04 PROCEDURE — 36415 COLL VENOUS BLD VENIPUNCTURE: CPT

## 2020-05-06 LAB
APPEARANCE: CLEAR
BASOPHILS # BLD AUTO: 0.05 K/UL
BASOPHILS NFR BLD AUTO: 0.8 %
BILIRUBIN URINE: NEGATIVE
BLOOD URINE: ABNORMAL
COLOR: NORMAL
EOSINOPHIL # BLD AUTO: 0.36 K/UL
EOSINOPHIL NFR BLD AUTO: 5.8 %
GLUCOSE QUALITATIVE U: NEGATIVE
HCT VFR BLD CALC: 40.1 %
HGB BLD-MCNC: 12.3 G/DL
IMM GRANULOCYTES NFR BLD AUTO: 0.2 %
KETONES URINE: NEGATIVE
LEUKOCYTE ESTERASE URINE: NEGATIVE
LYMPHOCYTES # BLD AUTO: 2.78 K/UL
LYMPHOCYTES NFR BLD AUTO: 44.7 %
MAN DIFF?: NORMAL
MCHC RBC-ENTMCNC: 28.5 PG
MCHC RBC-ENTMCNC: 30.7 GM/DL
MCV RBC AUTO: 93 FL
MONOCYTES # BLD AUTO: 0.62 K/UL
MONOCYTES NFR BLD AUTO: 10 %
NEUTROPHILS # BLD AUTO: 2.4 K/UL
NEUTROPHILS NFR BLD AUTO: 38.5 %
NITRITE URINE: NEGATIVE
PH URINE: 7
PLATELET # BLD AUTO: 214 K/UL
PROTEIN URINE: NEGATIVE
RBC # BLD: 4.31 M/UL
RBC # FLD: 15.7 %
SPECIFIC GRAVITY URINE: 1.01
UROBILINOGEN URINE: NORMAL
WBC # FLD AUTO: 6.22 K/UL

## 2020-05-07 ENCOUNTER — APPOINTMENT (OUTPATIENT)
Dept: HEART AND VASCULAR | Facility: CLINIC | Age: 70
End: 2020-05-07
Payer: MEDICARE

## 2020-05-07 VITALS
TEMPERATURE: 97.6 F | HEIGHT: 65 IN | BODY MASS INDEX: 33.99 KG/M2 | SYSTOLIC BLOOD PRESSURE: 162 MMHG | RESPIRATION RATE: 14 BRPM | HEART RATE: 88 BPM | WEIGHT: 204 LBS | DIASTOLIC BLOOD PRESSURE: 88 MMHG

## 2020-05-07 PROCEDURE — 99214 OFFICE O/P EST MOD 30 MIN: CPT | Mod: 95

## 2020-05-07 NOTE — PHYSICAL EXAM
[General Appearance - Well Developed] : well developed [Normal Appearance] : normal appearance [General Appearance - Well Nourished] : well nourished [Well Groomed] : well groomed [No Deformities] : no deformities [General Appearance - In No Acute Distress] : no acute distress [Eyelids - No Xanthelasma] : the eyelids demonstrated no xanthelasmas [Normal Conjunctiva] : the conjunctiva exhibited no abnormalities [Normal Oral Mucosa] : normal oral mucosa [No Oral Pallor] : no oral pallor [No Oral Cyanosis] : no oral cyanosis [Normal Jugular Venous V Waves Present] : normal jugular venous V waves present [Normal Jugular Venous A Waves Present] : normal jugular venous A waves present [No Jugular Venous Valenzuela A Waves] : no jugular venous valenzuela A waves [Respiration, Rhythm And Depth] : normal respiratory rhythm and effort [Exaggerated Use Of Accessory Muscles For Inspiration] : no accessory muscle use [Abdomen Soft] : soft [Heart Rate And Rhythm] : heart rate and rhythm were normal [Abnormal Walk] : normal gait [Abdomen Tenderness] : non-tender [Petechial Hemorrhages (___cm)] : no petechial hemorrhages [Nail Clubbing] : no clubbing of the fingernails [Cyanosis, Localized] : no localized cyanosis [Skin Color & Pigmentation] : normal skin color and pigmentation [] : no rash [No Venous Stasis] : no venous stasis [Skin Lesions] : no skin lesions [No Xanthoma] : no  xanthoma was observed [No Skin Ulcers] : no skin ulcer [Affect] : the affect was normal [Mood] : the mood was normal [Oriented To Time, Place, And Person] : oriented to person, place, and time [No Anxiety] : not feeling anxious

## 2020-05-07 NOTE — DISCUSSION/SUMMARY
[FreeTextEntry1] : Hematuria- Most probably Prostatitis  (see U/A C&S done). Cipro 14d prescribed. Advied f/u with , Dr. Dawson. Last saw  approximately  4 months ago, as per pt.\par Blood work reviewed with pt. Maintain a low caloric, low sodium, low cholesterol  diet. Dietary counseling given, diet and exercise discussed in detail.

## 2020-05-07 NOTE — HISTORY OF PRESENT ILLNESS
[Home] : at home, [unfilled] , at the time of the visit. [Medical Office: ([facility])___] : at the medical office located in  [Patient] : the patient [Self] : self [FreeTextEntry1] : This visit was provided via Telehealth using real- time 2-way audio visual technology. Verbal consent given on May 7th, 2020 at 9:40AM by George Lombardi (- 48), (patient). The patient and provider participated in the telehealth encounter Telehealth visit via e|tab.\par \par Pt. reports 4d of on/off hematuria. Denies dysuria, change in urinary function, discomfort, change in sexual function, fevers, chills, flank pain.

## 2020-05-11 ENCOUNTER — RX RENEWAL (OUTPATIENT)
Age: 70
End: 2020-05-11

## 2020-05-14 PROBLEM — T14.8XXA BLISTERED SKIN: Status: RESOLVED | Noted: 2018-05-29 | Resolved: 2020-05-14

## 2020-06-01 ENCOUNTER — APPOINTMENT (OUTPATIENT)
Dept: HEART AND VASCULAR | Facility: CLINIC | Age: 70
End: 2020-06-01

## 2020-06-02 ENCOUNTER — RX RENEWAL (OUTPATIENT)
Age: 70
End: 2020-06-02

## 2020-06-04 ENCOUNTER — NON-APPOINTMENT (OUTPATIENT)
Age: 70
End: 2020-06-04

## 2020-06-04 ENCOUNTER — APPOINTMENT (OUTPATIENT)
Dept: HEART AND VASCULAR | Facility: CLINIC | Age: 70
End: 2020-06-04
Payer: MEDICARE

## 2020-06-04 VITALS
HEIGHT: 65 IN | RESPIRATION RATE: 14 BRPM | DIASTOLIC BLOOD PRESSURE: 80 MMHG | BODY MASS INDEX: 34.32 KG/M2 | SYSTOLIC BLOOD PRESSURE: 130 MMHG | HEART RATE: 85 BPM | WEIGHT: 206 LBS

## 2020-06-04 DIAGNOSIS — M54.16 RADICULOPATHY, LUMBAR REGION: ICD-10-CM

## 2020-06-04 PROCEDURE — 93880 EXTRACRANIAL BILAT STUDY: CPT

## 2020-06-04 PROCEDURE — 93306 TTE W/DOPPLER COMPLETE: CPT

## 2020-06-04 PROCEDURE — 93000 ELECTROCARDIOGRAM COMPLETE: CPT

## 2020-06-04 PROCEDURE — 99214 OFFICE O/P EST MOD 30 MIN: CPT

## 2020-06-04 NOTE — HISTORY OF PRESENT ILLNESS
[Preoperative Visit] : for a medical evaluation prior to surgery [Scheduled Procedure ___] : a [unfilled] [Date of Surgery ___] : on [unfilled] [Surgeon Name ___] : surgeon: [unfilled] [Good] : Good [Fever] : no fever [Chills] : no chills [Fatigue] : no fatigue [Chest Pain] : no chest pain [Cough] : no cough [Dyspnea] : no dyspnea [Dysuria] : no dysuria [Urinary Frequency] : no urinary frequency [Nausea] : no nausea [Vomiting] : no vomiting [Diarrhea] : no diarrhea [Abdominal Pain] : no abdominal pain [Easy Bruising] : no easy bruising [Lower Extremity Swelling] : no lower extremity swelling [Poor Exercise Tolerance] : no poor exercise tolerance [Diabetes] : no diabetes [Pulmonary Disease] : pulmonary disease [Cardiovascular Disease] : cardiovascular disease [Nicotine Dependence] : nicotine dependence [GI Disease] : no gastrointestinal disease [Renal Disease] : no renal disease [Sleep Apnea] : no sleep apnea [Clotting Disorder] : no clotting disorder [Transfusion Reaction] : no transfusion reaction [Bleeding Disorder] : no bleeding disorder [Impaired Immunity] : no impaired immunity [Prior Anesthesia] : Prior anesthesia [Prev Anesthesia Reaction] : no previous anesthesia reaction [FreeTextEntry1] : Semi urgent appointment visit given to pt. who is requires pre-op clearance for neurosurgery.\par Denies Chest Pain, SOB, Dizziness, Leg edema, Orthopnea, PND, Palpitations, Syncope, KRISHNA, Diaphoresis.\par

## 2020-06-04 NOTE — PHYSICAL EXAM
[Normal Appearance] : normal appearance [General Appearance - Well Developed] : well developed [Well Groomed] : well groomed [General Appearance - Well Nourished] : well nourished [No Deformities] : no deformities [General Appearance - In No Acute Distress] : no acute distress [Normal Conjunctiva] : the conjunctiva exhibited no abnormalities [Eyelids - No Xanthelasma] : the eyelids demonstrated no xanthelasmas [Normal Oral Mucosa] : normal oral mucosa [No Oral Pallor] : no oral pallor [No Oral Cyanosis] : no oral cyanosis [Normal Jugular Venous A Waves Present] : normal jugular venous A waves present [Normal Jugular Venous V Waves Present] : normal jugular venous V waves present [No Jugular Venous Valenzuela A Waves] : no jugular venous valenzuela A waves [Auscultation Breath Sounds / Voice Sounds] : lungs were clear to auscultation bilaterally [Exaggerated Use Of Accessory Muscles For Inspiration] : no accessory muscle use [Respiration, Rhythm And Depth] : normal respiratory rhythm and effort [Heart Sounds] : normal S1 and S2 [Heart Rate And Rhythm] : heart rate and rhythm were normal [Systolic grade ___/6] : A grade [unfilled]/6 systolic murmur was heard. [Abdomen Tenderness] : non-tender [Abdomen Soft] : soft [Abdomen Mass (___ Cm)] : no abdominal mass palpated [Abnormal Walk] : normal gait [Petechial Hemorrhages (___cm)] : no petechial hemorrhages [Cyanosis, Localized] : no localized cyanosis [Nail Clubbing] : no clubbing of the fingernails [Skin Color & Pigmentation] : normal skin color and pigmentation [No Venous Stasis] : no venous stasis [] : no rash [Skin Lesions] : no skin lesions [No Skin Ulcers] : no skin ulcer [No Xanthoma] : no  xanthoma was observed [Oriented To Time, Place, And Person] : oriented to person, place, and time [Mood] : the mood was normal [Affect] : the affect was normal [No Anxiety] : not feeling anxious

## 2020-06-04 NOTE — DISCUSSION/SUMMARY
[Procedure Intermediate Risk] : the procedure risk is intermediate [Optimized for Surgery] : the patient is optimized for surgery [Patient Intermediate Risk] : the patient is an intermediate risk [As per surgery] : as per surgery [Continue] : Continue medications as currently directed [FreeTextEntry1] : Pt. is cleared for planned Neurosurgery procedure pending results of pre-op blood work being doen at [facility] and requested by surgeon.

## 2020-07-06 ENCOUNTER — APPOINTMENT (OUTPATIENT)
Dept: HEART AND VASCULAR | Facility: CLINIC | Age: 70
End: 2020-07-06
Payer: MEDICARE

## 2020-07-06 ENCOUNTER — LABORATORY RESULT (OUTPATIENT)
Age: 70
End: 2020-07-06

## 2020-07-06 ENCOUNTER — NON-APPOINTMENT (OUTPATIENT)
Age: 70
End: 2020-07-06

## 2020-07-06 VITALS
DIASTOLIC BLOOD PRESSURE: 70 MMHG | HEIGHT: 65 IN | WEIGHT: 195 LBS | RESPIRATION RATE: 14 BRPM | HEART RATE: 81 BPM | BODY MASS INDEX: 32.49 KG/M2 | SYSTOLIC BLOOD PRESSURE: 130 MMHG

## 2020-07-06 DIAGNOSIS — K59.00 CONSTIPATION, UNSPECIFIED: ICD-10-CM

## 2020-07-06 PROCEDURE — 93000 ELECTROCARDIOGRAM COMPLETE: CPT

## 2020-07-06 PROCEDURE — 99214 OFFICE O/P EST MOD 30 MIN: CPT

## 2020-07-06 PROCEDURE — 36415 COLL VENOUS BLD VENIPUNCTURE: CPT

## 2020-07-06 RX ORDER — NICOTINE TRANSDERMAL SYSTEM 21 MG/24H
21 PATCH, EXTENDED RELEASE TRANSDERMAL DAILY
Qty: 30 | Refills: 3 | Status: DISCONTINUED | COMMUNITY
End: 2020-07-06

## 2020-07-06 RX ORDER — CIPROFLOXACIN HYDROCHLORIDE 500 MG/1
500 TABLET, FILM COATED ORAL TWICE DAILY
Qty: 14 | Refills: 0 | Status: DISCONTINUED | COMMUNITY
Start: 2020-05-07 | End: 2020-07-06

## 2020-07-06 NOTE — DISCUSSION/SUMMARY
[Coronary Artery Disease] : coronary artery disease [Stable] : stable [None] : none [Smoking Cessation] : smoking cessation [Exercise Regimen] : an exercise regimen [Patient] : the patient [Family] : the patient's family [FreeTextEntry1] : Prediabetes- blood work drawn. Maintain a low caloric, low sodium, low cholesterol  diet. Dietary counseling given, diet and exercise discussed in detail.\par Smoking cessation- strategies reviewed with pt. and family-> nicotine patch dc'd ; nicotine Gum prescribed based on behavior/ pt.'s habits.

## 2020-07-06 NOTE — REASON FOR VISIT
[Follow-Up - Clinic] : a clinic follow-up of [Coronary Artery Disease] : coronary artery disease [CABG Follow-up] : bypass graft [FreeTextEntry1] : prediabetes and smoking addiction

## 2020-07-06 NOTE — HISTORY OF PRESENT ILLNESS
[FreeTextEntry1] : Denies Chest Pain, SOB, Dizziness, Leg edema, Orthopnea, PND, Palpitations, Syncope, KRISHNA, Diaphoresis.\par Stopped smoking for 3 weeks dring recovery from spinal surgery 6/20;  restarted last week.

## 2020-07-07 LAB
CHOLEST SERPL-MCNC: 136 MG/DL
CHOLEST/HDLC SERPL: 3.5 RATIO
ESTIMATED AVERAGE GLUCOSE: 108 MG/DL
HBA1C MFR BLD HPLC: 5.4 %
HDLC SERPL-MCNC: 39 MG/DL
LDLC SERPL CALC-MCNC: 70 MG/DL
SARS-COV-2 IGG SERPL IA-ACNC: <0.1 INDEX
SARS-COV-2 IGG SERPL QL IA: NEGATIVE
T3 SERPL-MCNC: 116 NG/DL
T3FREE SERPL-MCNC: 3.07 PG/ML
T3RU NFR SERPL: 1 TBI
T4 FREE SERPL-MCNC: 1.4 NG/DL
T4 SERPL-MCNC: 7 UG/DL
TRIGL SERPL-MCNC: 138 MG/DL
TSH SERPL-ACNC: 1.71 UIU/ML

## 2020-07-10 ENCOUNTER — APPOINTMENT (OUTPATIENT)
Dept: HEART AND VASCULAR | Facility: CLINIC | Age: 70
End: 2020-07-10

## 2020-09-09 LAB
ALBUMIN SERPL ELPH-MCNC: 4.1 G/DL
ALP BLD-CCNC: 124 U/L
ALT SERPL-CCNC: 11 U/L
ANION GAP SERPL CALC-SCNC: 13 MMOL/L
AST SERPL-CCNC: 12 U/L
BASOPHILS # BLD AUTO: 0.06 K/UL
BASOPHILS NFR BLD AUTO: 0.9 %
BILIRUB SERPL-MCNC: 0.3 MG/DL
BUN SERPL-MCNC: 22 MG/DL
CALCIUM SERPL-MCNC: 9.3 MG/DL
CHLORIDE SERPL-SCNC: 102 MMOL/L
CO2 SERPL-SCNC: 23 MMOL/L
CREAT SERPL-MCNC: 1.23 MG/DL
EOSINOPHIL # BLD AUTO: 0.27 K/UL
EOSINOPHIL NFR BLD AUTO: 4.2 %
GLUCOSE SERPL-MCNC: 92 MG/DL
HCT VFR BLD CALC: 33.9 %
HGB BLD-MCNC: 10.2 G/DL
IMM GRANULOCYTES NFR BLD AUTO: 0.5 %
LYMPHOCYTES # BLD AUTO: 2.64 K/UL
LYMPHOCYTES NFR BLD AUTO: 41.1 %
MAN DIFF?: NORMAL
MCHC RBC-ENTMCNC: 28.3 PG
MCHC RBC-ENTMCNC: 30.1 GM/DL
MCV RBC AUTO: 93.9 FL
MONOCYTES # BLD AUTO: 0.55 K/UL
MONOCYTES NFR BLD AUTO: 8.6 %
NEUTROPHILS # BLD AUTO: 2.87 K/UL
NEUTROPHILS NFR BLD AUTO: 44.7 %
PLATELET # BLD AUTO: 218 K/UL
POTASSIUM SERPL-SCNC: 5.6 MMOL/L
PROT SERPL-MCNC: 6.9 G/DL
RBC # BLD: 3.61 M/UL
RBC # FLD: 15.2 %
SODIUM SERPL-SCNC: 138 MMOL/L
WBC # FLD AUTO: 6.42 K/UL

## 2020-09-11 ENCOUNTER — APPOINTMENT (OUTPATIENT)
Dept: HEART AND VASCULAR | Facility: CLINIC | Age: 70
End: 2020-09-11

## 2020-09-15 ENCOUNTER — APPOINTMENT (OUTPATIENT)
Dept: VASCULAR SURGERY | Facility: CLINIC | Age: 70
End: 2020-09-15
Payer: MEDICARE

## 2020-09-15 PROCEDURE — 93978 VASCULAR STUDY: CPT

## 2020-09-15 PROCEDURE — 99212 OFFICE O/P EST SF 10 MIN: CPT

## 2020-09-17 NOTE — ASSESSMENT
[FreeTextEntry1] : 69yoM s/p R KERRI PTA/stent and L SFA/PTA PTA stent for moderate-severe claudication b/l, multiple spine surgeries via posterior approach for chronic spinal stenosis, returning for surveillance duplex of the legs and evaluation of an asymptomatic AAA, now measuring 4.9 on duplex.\par \par In light of recent growth, will plan for CTA a/p close to home to evaluate true size of aneurysm.  Will devise plan for repair after CTA is performed, if repair is indicated.\par \par I personally discussed this patient with the Physician Assistant at the time of the visit. I agree with the assessment and plan as written

## 2020-09-17 NOTE — PHYSICAL EXAM
[Normal Thyroid] : the thyroid was normal [Normal Rate and Rhythm] : normal rate and rhythm [Normal Breath Sounds] : Normal breath sounds [1+] : right 1+ [Ankle Swelling (On Exam)] : present [2+] : left 2+ [Ankle Swelling Bilaterally] : bilaterally  [Ankle Swelling On The Right] : mild [No Rash or Lesion] : No rash or lesion [Alert] : alert [Calm] : calm [JVD] : no jugular venous distention  [Varicose Veins Of Lower Extremities] : not present [] : not present [de-identified] : well-nourished, NAD [de-identified] : NC/AT, anicteric [de-identified] : SNTND [de-identified] : FROM throughout, strength 5/5x4, no palpable cords

## 2020-09-17 NOTE — HISTORY OF PRESENT ILLNESS
[FreeTextEntry1] : 69yoM previously seen for previous moderate-severe R thigh claudication and asymptomatic AAA not yet requiring repair, now s/p R KERRI PTA/stent and L SFA/PTA PTA stent, returns for surveillance duplex of R KERRI and reevaluation of aneurysm size.\par \par Pt denies rest pain in the legs/feet, but does not weakness in the thighs when exercising, and tightness in the calves after walking ~1block.  He denies abdominal pain, but still reports L-S pain and stiffness.

## 2020-10-05 ENCOUNTER — APPOINTMENT (OUTPATIENT)
Dept: HEART AND VASCULAR | Facility: CLINIC | Age: 70
End: 2020-10-05
Payer: MEDICARE

## 2020-10-05 ENCOUNTER — NON-APPOINTMENT (OUTPATIENT)
Age: 70
End: 2020-10-05

## 2020-10-05 VITALS
HEART RATE: 90 BPM | DIASTOLIC BLOOD PRESSURE: 70 MMHG | WEIGHT: 190 LBS | RESPIRATION RATE: 14 BRPM | SYSTOLIC BLOOD PRESSURE: 125 MMHG | BODY MASS INDEX: 31.65 KG/M2 | HEIGHT: 65 IN

## 2020-10-05 DIAGNOSIS — Z01.810 ENCOUNTER FOR PREPROCEDURAL CARDIOVASCULAR EXAMINATION: ICD-10-CM

## 2020-10-05 PROCEDURE — 99214 OFFICE O/P EST MOD 30 MIN: CPT

## 2020-10-05 PROCEDURE — 93000 ELECTROCARDIOGRAM COMPLETE: CPT

## 2020-10-05 PROCEDURE — 36415 COLL VENOUS BLD VENIPUNCTURE: CPT

## 2020-10-06 ENCOUNTER — TRANSCRIPTION ENCOUNTER (OUTPATIENT)
Age: 70
End: 2020-10-06

## 2020-10-06 LAB
ALBUMIN SERPL ELPH-MCNC: 4.4 G/DL
ALP BLD-CCNC: 90 U/L
ALT SERPL-CCNC: 10 U/L
ANION GAP SERPL CALC-SCNC: 11 MMOL/L
APTT BLD: 32.7 SEC
AST SERPL-CCNC: 16 U/L
BASOPHILS # BLD AUTO: 0.06 K/UL
BASOPHILS NFR BLD AUTO: 1 %
BILIRUB SERPL-MCNC: 0.3 MG/DL
BUN SERPL-MCNC: 20 MG/DL
CALCIUM SERPL-MCNC: 9.5 MG/DL
CHLORIDE SERPL-SCNC: 106 MMOL/L
CO2 SERPL-SCNC: 24 MMOL/L
CREAT SERPL-MCNC: 1.04 MG/DL
EOSINOPHIL # BLD AUTO: 0.15 K/UL
EOSINOPHIL NFR BLD AUTO: 2.5 %
GLUCOSE SERPL-MCNC: 103 MG/DL
HCT VFR BLD CALC: 32.8 %
HGB BLD-MCNC: 9.8 G/DL
IMM GRANULOCYTES NFR BLD AUTO: 0.2 %
INR PPP: 1.13 RATIO
LYMPHOCYTES # BLD AUTO: 2.19 K/UL
LYMPHOCYTES NFR BLD AUTO: 36.3 %
MAN DIFF?: NORMAL
MCHC RBC-ENTMCNC: 25.1 PG
MCHC RBC-ENTMCNC: 29.9 GM/DL
MCV RBC AUTO: 84.1 FL
MONOCYTES # BLD AUTO: 0.44 K/UL
MONOCYTES NFR BLD AUTO: 7.3 %
NEUTROPHILS # BLD AUTO: 3.19 K/UL
NEUTROPHILS NFR BLD AUTO: 52.7 %
PLATELET # BLD AUTO: 212 K/UL
POTASSIUM SERPL-SCNC: 4.6 MMOL/L
PROT SERPL-MCNC: 7 G/DL
PT BLD: 13.4 SEC
RBC # BLD: 3.9 M/UL
RBC # FLD: 16.5 %
SODIUM SERPL-SCNC: 140 MMOL/L
WBC # FLD AUTO: 6.04 K/UL

## 2020-10-06 RX ORDER — ROSUVASTATIN CALCIUM 5 MG/1
0 TABLET ORAL
Qty: 0 | Refills: 0 | DISCHARGE

## 2020-10-06 RX ORDER — ASPIRIN/CALCIUM CARB/MAGNESIUM 324 MG
1 TABLET ORAL
Qty: 0 | Refills: 0 | DISCHARGE

## 2020-10-06 RX ORDER — AMLODIPINE AND VALSARTAN 5; 320 MG/1; MG/1
1 TABLET, FILM COATED ORAL
Qty: 0 | Refills: 0 | DISCHARGE

## 2020-10-06 RX ORDER — FLUTICASONE PROPIONATE 50 MCG
1 SPRAY, SUSPENSION NASAL
Qty: 0 | Refills: 0 | DISCHARGE

## 2020-10-06 RX ORDER — ALPRAZOLAM 0.25 MG
1 TABLET ORAL
Qty: 0 | Refills: 0 | DISCHARGE

## 2020-10-06 NOTE — HISTORY OF PRESENT ILLNESS
[Preoperative Visit] : for a medical evaluation prior to surgery [Scheduled Procedure ___] : a [unfilled] [Good] : Good [Cardiovascular Disease] : cardiovascular disease [Pulmonary Disease] : pulmonary disease [Nicotine Dependence] : nicotine dependence [Prior Anesthesia] : Prior anesthesia [Date of Surgery ___] : on [unfilled] [Surgeon Name ___] : surgeon: [unfilled] [Fever] : no fever [Chills] : no chills [Fatigue] : no fatigue [Chest Pain] : no chest pain [Cough] : no cough [Dyspnea] : no dyspnea [Dysuria] : no dysuria [Urinary Frequency] : no urinary frequency [Nausea] : no nausea [Vomiting] : no vomiting [Diarrhea] : no diarrhea [Abdominal Pain] : no abdominal pain [Easy Bruising] : no easy bruising [Lower Extremity Swelling] : no lower extremity swelling [Poor Exercise Tolerance] : no poor exercise tolerance [Diabetes] : no diabetes [Renal Disease] : no renal disease [GI Disease] : no gastrointestinal disease [Sleep Apnea] : no sleep apnea [Clotting Disorder] : no clotting disorder [Bleeding Disorder] : no bleeding disorder [Transfusion Reaction] : no transfusion reaction [Impaired Immunity] : no impaired immunity [Prev Anesthesia Reaction] : no previous anesthesia reaction [FreeTextEntry1] : Semi urgent appointment visit given to pt. who is requires pre-op clearance for neurosurgery.\par Denies Chest Pain, SOB, Dizziness, Leg edema, Orthopnea, PND, Palpitations, Syncope, KRISHNA, Diaphoresis.\par

## 2020-10-06 NOTE — DISCUSSION/SUMMARY
[Procedure Intermediate Risk] : the procedure risk is intermediate [Patient Intermediate Risk] : the patient is an intermediate risk [Optimized for Surgery] : the patient is optimized for surgery [As per surgery] : as per surgery [Continue] : Continue medications as currently directed [FreeTextEntry1] : Pt. is cleared for planned AAA repair pending review of his pre-op blood work and Covid-19 diagnostic test.

## 2020-10-07 ENCOUNTER — TRANSCRIPTION ENCOUNTER (OUTPATIENT)
Age: 70
End: 2020-10-07

## 2020-10-07 ENCOUNTER — INPATIENT (INPATIENT)
Facility: HOSPITAL | Age: 70
LOS: 0 days | Discharge: HOME | DRG: 269 | End: 2020-10-08
Attending: SURGERY | Admitting: SURGERY
Payer: MEDICARE

## 2020-10-07 ENCOUNTER — APPOINTMENT (OUTPATIENT)
Dept: VASCULAR SURGERY | Facility: HOSPITAL | Age: 70
End: 2020-10-07

## 2020-10-07 VITALS
TEMPERATURE: 98 F | HEART RATE: 81 BPM | DIASTOLIC BLOOD PRESSURE: 73 MMHG | SYSTOLIC BLOOD PRESSURE: 133 MMHG | HEIGHT: 68 IN | WEIGHT: 188.94 LBS | RESPIRATION RATE: 16 BRPM | OXYGEN SATURATION: 98 %

## 2020-10-07 DIAGNOSIS — Z41.9 ENCOUNTER FOR PROCEDURE FOR PURPOSES OTHER THAN REMEDYING HEALTH STATE, UNSPECIFIED: Chronic | ICD-10-CM

## 2020-10-07 DIAGNOSIS — Z95.1 PRESENCE OF AORTOCORONARY BYPASS GRAFT: Chronic | ICD-10-CM

## 2020-10-07 DIAGNOSIS — I71.4 ABDOMINAL AORTIC ANEURYSM, WITHOUT RUPTURE: ICD-10-CM

## 2020-10-07 DIAGNOSIS — Z98.890 OTHER SPECIFIED POSTPROCEDURAL STATES: Chronic | ICD-10-CM

## 2020-10-07 DIAGNOSIS — Z98.1 ARTHRODESIS STATUS: Chronic | ICD-10-CM

## 2020-10-07 LAB
ANION GAP SERPL CALC-SCNC: 9 MMOL/L — SIGNIFICANT CHANGE UP (ref 5–17)
BLD GP AB SCN SERPL QL: NEGATIVE — SIGNIFICANT CHANGE UP
BUN SERPL-MCNC: 12 MG/DL — SIGNIFICANT CHANGE UP (ref 7–23)
CALCIUM SERPL-MCNC: 8.7 MG/DL — SIGNIFICANT CHANGE UP (ref 8.4–10.5)
CHLORIDE SERPL-SCNC: 106 MMOL/L — SIGNIFICANT CHANGE UP (ref 96–108)
CO2 SERPL-SCNC: 23 MMOL/L — SIGNIFICANT CHANGE UP (ref 22–31)
CREAT SERPL-MCNC: 0.83 MG/DL — SIGNIFICANT CHANGE UP (ref 0.5–1.3)
GLUCOSE SERPL-MCNC: 106 MG/DL — HIGH (ref 70–99)
HCT VFR BLD CALC: 29.3 % — LOW (ref 39–50)
HGB BLD-MCNC: 9 G/DL — LOW (ref 13–17)
MAGNESIUM SERPL-MCNC: 2.1 MG/DL — SIGNIFICANT CHANGE UP (ref 1.6–2.6)
MCHC RBC-ENTMCNC: 25.2 PG — LOW (ref 27–34)
MCHC RBC-ENTMCNC: 30.7 GM/DL — LOW (ref 32–36)
MCV RBC AUTO: 82.1 FL — SIGNIFICANT CHANGE UP (ref 80–100)
NRBC # BLD: 0 /100 WBCS — SIGNIFICANT CHANGE UP (ref 0–0)
PHOSPHATE SERPL-MCNC: 4.2 MG/DL — SIGNIFICANT CHANGE UP (ref 2.5–4.5)
PLATELET # BLD AUTO: 166 K/UL — SIGNIFICANT CHANGE UP (ref 150–400)
POTASSIUM SERPL-MCNC: 4.6 MMOL/L — SIGNIFICANT CHANGE UP (ref 3.5–5.3)
POTASSIUM SERPL-SCNC: 4.6 MMOL/L — SIGNIFICANT CHANGE UP (ref 3.5–5.3)
RBC # BLD: 3.57 M/UL — LOW (ref 4.2–5.8)
RBC # FLD: 16.1 % — HIGH (ref 10.3–14.5)
RH IG SCN BLD-IMP: POSITIVE — SIGNIFICANT CHANGE UP
SODIUM SERPL-SCNC: 138 MMOL/L — SIGNIFICANT CHANGE UP (ref 135–145)
WBC # BLD: 5.27 K/UL — SIGNIFICANT CHANGE UP (ref 3.8–10.5)
WBC # FLD AUTO: 5.27 K/UL — SIGNIFICANT CHANGE UP (ref 3.8–10.5)

## 2020-10-07 PROCEDURE — 34705 EVAC RPR A-BIILIAC NDGFT: CPT | Mod: GC

## 2020-10-07 PROCEDURE — 34713 PERQ ACCESS & CLSR FEM ART: CPT | Mod: 50,GC

## 2020-10-07 PROCEDURE — 34705 EVAC RPR A-BIILIAC NDGFT: CPT | Mod: 82,GC

## 2020-10-07 RX ORDER — SODIUM CHLORIDE 9 MG/ML
1000 INJECTION INTRAMUSCULAR; INTRAVENOUS; SUBCUTANEOUS
Refills: 0 | Status: DISCONTINUED | OUTPATIENT
Start: 2020-10-07 | End: 2020-10-08

## 2020-10-07 RX ORDER — ALBUTEROL 90 UG/1
2 AEROSOL, METERED ORAL EVERY 6 HOURS
Refills: 0 | Status: DISCONTINUED | OUTPATIENT
Start: 2020-10-07 | End: 2020-10-08

## 2020-10-07 RX ORDER — AMLODIPINE BESYLATE 2.5 MG/1
10 TABLET ORAL DAILY
Refills: 0 | Status: DISCONTINUED | OUTPATIENT
Start: 2020-10-07 | End: 2020-10-08

## 2020-10-07 RX ORDER — INFLUENZA VIRUS VACCINE 15; 15; 15; 15 UG/.5ML; UG/.5ML; UG/.5ML; UG/.5ML
0.7 SUSPENSION INTRAMUSCULAR ONCE
Refills: 0 | Status: DISCONTINUED | OUTPATIENT
Start: 2020-10-07 | End: 2020-10-08

## 2020-10-07 RX ORDER — ASPIRIN/CALCIUM CARB/MAGNESIUM 324 MG
81 TABLET ORAL DAILY
Refills: 0 | Status: DISCONTINUED | OUTPATIENT
Start: 2020-10-08 | End: 2020-10-08

## 2020-10-07 RX ORDER — TAMSULOSIN HYDROCHLORIDE 0.4 MG/1
0.4 CAPSULE ORAL AT BEDTIME
Refills: 0 | Status: DISCONTINUED | OUTPATIENT
Start: 2020-10-07 | End: 2020-10-08

## 2020-10-07 RX ORDER — PANTOPRAZOLE SODIUM 20 MG/1
40 TABLET, DELAYED RELEASE ORAL
Refills: 0 | Status: DISCONTINUED | OUTPATIENT
Start: 2020-10-08 | End: 2020-10-08

## 2020-10-07 RX ORDER — ONDANSETRON 8 MG/1
4 TABLET, FILM COATED ORAL EVERY 6 HOURS
Refills: 0 | Status: DISCONTINUED | OUTPATIENT
Start: 2020-10-07 | End: 2020-10-08

## 2020-10-07 RX ORDER — OXYCODONE AND ACETAMINOPHEN 5; 325 MG/1; MG/1
1 TABLET ORAL EVERY 6 HOURS
Refills: 0 | Status: DISCONTINUED | OUTPATIENT
Start: 2020-10-07 | End: 2020-10-08

## 2020-10-07 RX ORDER — ATORVASTATIN CALCIUM 80 MG/1
80 TABLET, FILM COATED ORAL AT BEDTIME
Refills: 0 | Status: DISCONTINUED | OUTPATIENT
Start: 2020-10-08 | End: 2020-10-08

## 2020-10-07 RX ORDER — INFLUENZA VIRUS VACCINE 15; 15; 15; 15 UG/.5ML; UG/.5ML; UG/.5ML; UG/.5ML
0.5 SUSPENSION INTRAMUSCULAR ONCE
Refills: 0 | Status: DISCONTINUED | OUTPATIENT
Start: 2020-10-07 | End: 2020-10-07

## 2020-10-07 RX ORDER — SODIUM CHLORIDE 9 MG/ML
1000 INJECTION, SOLUTION INTRAVENOUS
Refills: 0 | Status: DISCONTINUED | OUTPATIENT
Start: 2020-10-07 | End: 2020-10-07

## 2020-10-07 RX ORDER — OXYCODONE AND ACETAMINOPHEN 5; 325 MG/1; MG/1
2 TABLET ORAL EVERY 6 HOURS
Refills: 0 | Status: DISCONTINUED | OUTPATIENT
Start: 2020-10-07 | End: 2020-10-08

## 2020-10-07 RX ADMIN — AMLODIPINE BESYLATE 10 MILLIGRAM(S): 2.5 TABLET ORAL at 18:31

## 2020-10-07 RX ADMIN — OXYCODONE AND ACETAMINOPHEN 1 TABLET(S): 5; 325 TABLET ORAL at 19:27

## 2020-10-07 RX ADMIN — OXYCODONE AND ACETAMINOPHEN 1 TABLET(S): 5; 325 TABLET ORAL at 19:00

## 2020-10-07 RX ADMIN — SODIUM CHLORIDE 100 MILLILITER(S): 9 INJECTION INTRAMUSCULAR; INTRAVENOUS; SUBCUTANEOUS at 18:31

## 2020-10-07 RX ADMIN — TAMSULOSIN HYDROCHLORIDE 0.4 MILLIGRAM(S): 0.4 CAPSULE ORAL at 21:04

## 2020-10-07 NOTE — DISCHARGE NOTE PROVIDER - CARE PROVIDER_API CALL
Jayshree Hdez  SURGERY  [address], [place] [facility]  Phone: [phone]  Fax: [phone]  Follow Up Time: 2 weeks   Jayshree Hdez  SURGERY  [address], [place] [facility]  Phone: [phone]  Fax: [phone]  Follow Up Time: 1 week   Jayshree Hdez  SURGERY  [address], [place] [address]  Phone: [phone]  Fax: [phone]  Scheduled Appointment: 10/20/2020 12:15 PM

## 2020-10-07 NOTE — DISCHARGE NOTE PROVIDER - NSDCCPTREATMENT_GEN_ALL_CORE_FT
PRINCIPAL PROCEDURE  Procedure: Endovascular repair of abdominal aortic aneurysm with laparotomy if indicated  Findings and Treatment:

## 2020-10-07 NOTE — DISCHARGE NOTE PROVIDER - NSDCMRMEDTOKEN_GEN_ALL_CORE_FT
albuterol 90 mcg/inh inhalation aerosol: 2 puff(s) inhaled every 8 hours, As Needed  ALPRAZolam 0.5 mg oral tablet:   amlodipine-valsartan 10 mg-320 mg oral tablet: 1 tab(s) orally once a day  aspirin 81 mg oral tablet: 1  orally once a day  Crestor 20 mg oral tablet: orally once a day (at bedtime)  NexIUM 40 mg oral delayed release capsule: 1 cap(s) orally once a day  tamsulosin 0.4 mg oral capsule: orally once a day (at bedtime)

## 2020-10-07 NOTE — BRIEF OPERATIVE NOTE - NSICDXBRIEFPROCEDURE_GEN_ALL_CORE_FT
PROCEDURES:  Endovascular repair of abdominal aortic aneurysm with laparotomy if indicated 07-Oct-2020 11:12:33  Mina Cabrera

## 2020-10-07 NOTE — DISCHARGE NOTE PROVIDER - HOSPITAL COURSE
69yoM previously seen for previous moderate-severe R thigh claudication and asymptomatic AAA not yet requiring repair, now s/p R KERRI PTA/stent and L SFA/PTA PTA stent, returns for surveillance duplex of R KERRI and reevaluation of aneurysm size.     69yoM previously seen for previous moderate-severe R thigh claudication and asymptomatic AAA not yet requiring repair, now s/p R KERRI PTA/stent and L SFA/PTA PTA stent, returns for surveillance duplex of R KERRI and reevaluation of aneurysm size. Patient underwent EVAR. He was transferred to the PACU for monitoring where he remained hemodynamically stable and labs and EKG were unremarkable.     He was transferred to the telemetry floors where he was started on a regular diet which he tolerated well. His patrick cath was removed and he was able to void well. He was able to ambulate without difficulty. He voices readiness for discharge

## 2020-10-07 NOTE — H&P ADULT - HISTORY OF PRESENT ILLNESS
pending pending    69yoM previously seen for previous moderate-severe R thigh claudication and asymptomatic AAA not yet requiring repair, now s/p R KERRI PTA/stent and L SFA/PTA PTA stent, returns for surveillance duplex of R KERRI and reevaluation of aneurysm size.    Pt denies rest pain in the legs/feet, but does not weakness in the thighs when exercising, and tightness in the calves after walking ~1block. He denies abdominal pain, but still reports L-S pain and stiffness. 69M with history of CAD s/p CABG (2003), laminectomy x4 and L4-S1 spinal fusion, moderate-severe right thigh claudication s/p R KERRI PTA/stent and L SFA/PTA PTA stent, and expanding AAA presenting for elective EVAR.

## 2020-10-07 NOTE — BRIEF OPERATIVE NOTE - OPERATION/FINDINGS
EVAR    details pending Procedure: EVAR    Bilateral femoral access obtained.     - main body left: 26 x 14.5 x 14 cm [place] Excluder [id number].  - Contra limb right: 14.5 x 14 cm [place] Excluder [id number]  - VBX right contra: 8L x 79 [id number]  - completion aortogram showed no endoleak  - sheaths removed from femoral arteries, arteriotomies repaired using Perclose technique

## 2020-10-07 NOTE — PROGRESS NOTE ADULT - SUBJECTIVE AND OBJECTIVE BOX
Vascular Surgery Post-Op Note    Procedure:    Diagnosis/Indication:    Surgeon:    S: Pt has no complaints. Denies CP, SOB, KRISHNA, calf tenderness. Pain controlled with medication.    O: Denies abdominal pain, Nausea, vomiting. denies bilateral groin pain or bilateral leg pain/ weakness   T(C): 36.6 (10-07-20 @ 13:00), Max: 36.6 (10-07-20 @ 13:00)  T(F): 97.9 (10-07-20 @ 13:00), Max: 97.9 (10-07-20 @ 13:00)  HR: 69 (10-07-20 @ 13:30) (61 - 69)  BP: 155/70 (10-07-20 @ 13:30) (141/65 - 156/72)  RR: 15 (10-07-20 @ 13:30) (13 - 18)  SpO2: 100% (10-07-20 @ 13:30) (100% - 100%)  Wt(kg): --                        9.0    5.27  )-----------( 166      ( 07 Oct 2020 11:40 )             29.3     10-07    138  |  106  |  12  ----------------------------<  106<H>  4.6   |  23  |  0.83    Ca    8.7      07 Oct 2020 11:40  Phos  4.2     10-07  Mg     2.1     10-07        Gen: NAD, resting comfortably in bed  C/V: NSR  Pulm: Nonlabored breathing, no respiratory distress  Abd: soft, NT/ND  Extrem: bilateral groins soft ,   Rt: DP(M) PT(B)   LT: DP/PT (B)       A/P: 69yMale s/p above procedure  Diet: Clears  IVF:LR@100  Pain/nausea control  DVT ppx: holding   continue ASA   Dispo plan: 4 hours flat, bedrest today

## 2020-10-07 NOTE — H&P ADULT - NSHPPHYSICALEXAM_GEN_ALL_CORE
Gen: NAD, resting comfortably in bed  C/V: NSR  Pulm: Nonlabored breathing, no respiratory distress  Abd: soft, NT/ND.   Extrem: Nonedematous  Vascular:     RLE: mono DP, bi PT  LLE: bi BP, bi PT

## 2020-10-07 NOTE — DISCHARGE NOTE PROVIDER - PROVIDER TOKENS
PROVIDER:[TOKEN:[[id number]],FOLLOWUP:[2 weeks]] PROVIDER:[TOKEN:[[id number]],FOLLOWUP:[1 week]] PROVIDER:[TOKEN:[[id number]],SCHEDULEDAPPT:[10/20/2020],SCHEDULEDAPPTTIME:[12:15 PM]]

## 2020-10-07 NOTE — DISCHARGE NOTE PROVIDER - NSDCCPCAREPLAN_GEN_ALL_CORE_FT
PRINCIPAL DISCHARGE DIAGNOSIS  Diagnosis: AAA (abdominal aortic aneurysm)  Assessment and Plan of Treatment:       SECONDARY DISCHARGE DIAGNOSES  Diagnosis: History of coronary artery bypass graft x 1  Assessment and Plan of Treatment:     Diagnosis: History of prostate cancer  Assessment and Plan of Treatment:     Diagnosis: PAD (peripheral artery disease)  Assessment and Plan of Treatment:     Diagnosis: History of hepatitis B  Assessment and Plan of Treatment:     Diagnosis: CAD (coronary artery disease)  Assessment and Plan of Treatment:     Diagnosis: History of bladder cancer  Assessment and Plan of Treatment:     Diagnosis: Asthma  Assessment and Plan of Treatment:     Diagnosis: Lumbar stenosis  Assessment and Plan of Treatment:     Diagnosis: Hypertension  Assessment and Plan of Treatment:     Diagnosis: Hyperlipidemia  Assessment and Plan of Treatment:     Diagnosis: BPH (benign prostatic hyperplasia)  Assessment and Plan of Treatment:     Diagnosis: Chronic GERD  Assessment and Plan of Treatment:      PRINCIPAL DISCHARGE DIAGNOSIS  Diagnosis: AAA (abdominal aortic aneurysm)  Assessment and Plan of Treatment:       SECONDARY DISCHARGE DIAGNOSES  Diagnosis: Chronic anemia  Assessment and Plan of Treatment:     Diagnosis: History of coronary artery bypass graft x 1  Assessment and Plan of Treatment:     Diagnosis: History of prostate cancer  Assessment and Plan of Treatment:     Diagnosis: PAD (peripheral artery disease)  Assessment and Plan of Treatment:     Diagnosis: History of hepatitis B  Assessment and Plan of Treatment:     Diagnosis: CAD (coronary artery disease)  Assessment and Plan of Treatment:     Diagnosis: History of bladder cancer  Assessment and Plan of Treatment:     Diagnosis: Asthma  Assessment and Plan of Treatment:     Diagnosis: Lumbar stenosis  Assessment and Plan of Treatment:     Diagnosis: Hypertension  Assessment and Plan of Treatment:     Diagnosis: Hyperlipidemia  Assessment and Plan of Treatment:     Diagnosis: BPH (benign prostatic hyperplasia)  Assessment and Plan of Treatment:     Diagnosis: Chronic GERD  Assessment and Plan of Treatment:      PRINCIPAL DISCHARGE DIAGNOSIS  Diagnosis: AAA (abdominal aortic aneurysm)  Assessment and Plan of Treatment:       SECONDARY DISCHARGE DIAGNOSES  Diagnosis: Anxiety  Assessment and Plan of Treatment:     Diagnosis: Chronic anemia  Assessment and Plan of Treatment:     Diagnosis: History of coronary artery bypass graft x 1  Assessment and Plan of Treatment:     Diagnosis: History of prostate cancer  Assessment and Plan of Treatment:     Diagnosis: PAD (peripheral artery disease)  Assessment and Plan of Treatment:     Diagnosis: History of hepatitis B  Assessment and Plan of Treatment:     Diagnosis: CAD (coronary artery disease)  Assessment and Plan of Treatment:     Diagnosis: History of bladder cancer  Assessment and Plan of Treatment:     Diagnosis: Asthma  Assessment and Plan of Treatment:     Diagnosis: Lumbar stenosis  Assessment and Plan of Treatment:     Diagnosis: Hypertension  Assessment and Plan of Treatment:     Diagnosis: Hyperlipidemia  Assessment and Plan of Treatment:     Diagnosis: BPH (benign prostatic hyperplasia)  Assessment and Plan of Treatment:     Diagnosis: Chronic GERD  Assessment and Plan of Treatment:

## 2020-10-07 NOTE — DISCHARGE NOTE PROVIDER - NSDCFUADDINST_GEN_ALL_CORE_FT
Follow up with  ___ in 1-2 weeks. Call the office at  to schedule your appointment. You may shower; soap and water over incision sites. Do not scrub. Pat dry when done.  Ambulate as tolerated, but no heavy lifting (>10lbs) or strenuous exercise. You may resume regular diet.   Call the office if you experience increasing pain, redness, swelling or drainage from incision sites/wounds, or temperature >101.4F. FOLLOW UP: ___ in 1 week. Your appointment has been made for _______. Call the office at  with any questions...WOUND CARE: You may shower; soap and water over incision sites. Do not scrub. Pat dry when done. ACTIVITY:Ambulate as tolerated, but no heavy lifting (>10lbs) or strenuous exercise....DIET:   You may resume regular diet. Call the office if you experience increasing pain, redness, swelling or drainage from incision sites/wounds, or temperature >101.4F. NEW MEDICATIONS:ADDITIONAL FOLLOW UP AFTER DISCHARGE:DISCHARGE DESTINATION: FOLLOW UP:   Dr. Hdez in 1 week. Your appointment has been made for _______. Call the office at  with any questions.    WOUND CARE: You may shower; . Do not scrub. Pat dry when done.    ACTIVITY:Ambulate as tolerated, but no heavy lifting (>10lbs) or strenuous exercise.    DIET:   You may resume regular diet. Call the office if you experience increasing pain, redness, swelling or drainage from incision sites/wounds, or temperature >101.4F.     NEW MEDICATIONS:    Tylenol as needed for pain     ADDITIONAL FOLLOW UP AFTER DISCHARGE:  Please follow up with your Primary care Doctor in 2 weeks     DISCHARGE DESTINATION: Home FOLLOW UP:   Dr. Hdez in 1 week. Your appointment has been made for 10/20/20 at 12:15pm. Call the office at  with any questions.    WOUND CARE: You may shower; . Do not scrub. Pat dry when done.    ACTIVITY:Ambulate as tolerated, but no heavy lifting (>10lbs) or strenuous exercise.    DIET:   You may resume regular diet. Call the office if you experience increasing pain, redness, swelling or drainage from incision sites/wounds, or temperature >101.4F.     NEW MEDICATIONS:    Tylenol as needed for pain     ADDITIONAL FOLLOW UP AFTER DISCHARGE:  Please follow up with your Primary care Doctor in 2 weeks     DISCHARGE DESTINATION: Home

## 2020-10-08 ENCOUNTER — APPOINTMENT (OUTPATIENT)
Dept: HEART AND VASCULAR | Facility: CLINIC | Age: 70
End: 2020-10-08

## 2020-10-08 ENCOUNTER — TRANSCRIPTION ENCOUNTER (OUTPATIENT)
Age: 70
End: 2020-10-08

## 2020-10-08 VITALS — TEMPERATURE: 97 F

## 2020-10-08 PROBLEM — N40.0 BENIGN PROSTATIC HYPERPLASIA WITHOUT LOWER URINARY TRACT SYMPTOMS: Chronic | Status: ACTIVE | Noted: 2020-10-06

## 2020-10-08 PROBLEM — F17.200 NICOTINE DEPENDENCE, UNSPECIFIED, UNCOMPLICATED: Chronic | Status: ACTIVE | Noted: 2020-10-06

## 2020-10-08 PROBLEM — M54.16 RADICULOPATHY, LUMBAR REGION: Chronic | Status: ACTIVE | Noted: 2020-10-06

## 2020-10-08 PROBLEM — M51.26 OTHER INTERVERTEBRAL DISC DISPLACEMENT, LUMBAR REGION: Chronic | Status: ACTIVE | Noted: 2020-10-06

## 2020-10-08 PROBLEM — R78.81 BACTEREMIA: Chronic | Status: ACTIVE | Noted: 2020-10-06

## 2020-10-08 PROBLEM — M48.062 SPINAL STENOSIS, LUMBAR REGION WITH NEUROGENIC CLAUDICATION: Chronic | Status: ACTIVE | Noted: 2020-10-06

## 2020-10-08 PROBLEM — G06.2 EXTRADURAL AND SUBDURAL ABSCESS, UNSPECIFIED: Chronic | Status: ACTIVE | Noted: 2020-10-06

## 2020-10-08 PROBLEM — R73.03 PREDIABETES: Chronic | Status: ACTIVE | Noted: 2020-10-06

## 2020-10-08 PROBLEM — M47.816 SPONDYLOSIS WITHOUT MYELOPATHY OR RADICULOPATHY, LUMBAR REGION: Chronic | Status: ACTIVE | Noted: 2020-10-06

## 2020-10-08 PROBLEM — I65.23 OCCLUSION AND STENOSIS OF BILATERAL CAROTID ARTERIES: Chronic | Status: ACTIVE | Noted: 2020-10-06

## 2020-10-08 PROBLEM — J45.909 UNSPECIFIED ASTHMA, UNCOMPLICATED: Chronic | Status: ACTIVE | Noted: 2020-10-06

## 2020-10-08 PROBLEM — B19.20 UNSPECIFIED VIRAL HEPATITIS C WITHOUT HEPATIC COMA: Chronic | Status: ACTIVE | Noted: 2020-10-06

## 2020-10-08 PROBLEM — M46.20 OSTEOMYELITIS OF VERTEBRA, SITE UNSPECIFIED: Chronic | Status: ACTIVE | Noted: 2020-10-06

## 2020-10-08 LAB
ANION GAP SERPL CALC-SCNC: 11 MMOL/L — SIGNIFICANT CHANGE UP (ref 5–17)
BUN SERPL-MCNC: 10 MG/DL — SIGNIFICANT CHANGE UP (ref 7–23)
CALCIUM SERPL-MCNC: 8.3 MG/DL — LOW (ref 8.4–10.5)
CHLORIDE SERPL-SCNC: 108 MMOL/L — SIGNIFICANT CHANGE UP (ref 96–108)
CO2 SERPL-SCNC: 22 MMOL/L — SIGNIFICANT CHANGE UP (ref 22–31)
CREAT SERPL-MCNC: 0.83 MG/DL — SIGNIFICANT CHANGE UP (ref 0.5–1.3)
GLUCOSE SERPL-MCNC: 107 MG/DL — HIGH (ref 70–99)
HCT VFR BLD CALC: 28.9 % — LOW (ref 39–50)
HCV AB S/CO SERPL IA: 0.05 S/CO — SIGNIFICANT CHANGE UP
HCV AB SERPL-IMP: SIGNIFICANT CHANGE UP
HGB BLD-MCNC: 8.9 G/DL — LOW (ref 13–17)
MAGNESIUM SERPL-MCNC: 2.1 MG/DL — SIGNIFICANT CHANGE UP (ref 1.6–2.6)
MCHC RBC-ENTMCNC: 25.4 PG — LOW (ref 27–34)
MCHC RBC-ENTMCNC: 30.8 GM/DL — LOW (ref 32–36)
MCV RBC AUTO: 82.6 FL — SIGNIFICANT CHANGE UP (ref 80–100)
NRBC # BLD: 0 /100 WBCS — SIGNIFICANT CHANGE UP (ref 0–0)
PHOSPHATE SERPL-MCNC: 3.6 MG/DL — SIGNIFICANT CHANGE UP (ref 2.5–4.5)
PLATELET # BLD AUTO: 166 K/UL — SIGNIFICANT CHANGE UP (ref 150–400)
POTASSIUM SERPL-MCNC: 4 MMOL/L — SIGNIFICANT CHANGE UP (ref 3.5–5.3)
POTASSIUM SERPL-SCNC: 4 MMOL/L — SIGNIFICANT CHANGE UP (ref 3.5–5.3)
RBC # BLD: 3.5 M/UL — LOW (ref 4.2–5.8)
RBC # FLD: 16.3 % — HIGH (ref 10.3–14.5)
SODIUM SERPL-SCNC: 141 MMOL/L — SIGNIFICANT CHANGE UP (ref 135–145)
WBC # BLD: 6.66 K/UL — SIGNIFICANT CHANGE UP (ref 3.8–10.5)
WBC # FLD AUTO: 6.66 K/UL — SIGNIFICANT CHANGE UP (ref 3.8–10.5)

## 2020-10-08 PROCEDURE — 83735 ASSAY OF MAGNESIUM: CPT

## 2020-10-08 PROCEDURE — 80048 BASIC METABOLIC PNL TOTAL CA: CPT

## 2020-10-08 PROCEDURE — 86803 HEPATITIS C AB TEST: CPT

## 2020-10-08 PROCEDURE — 86901 BLOOD TYPING SEROLOGIC RH(D): CPT

## 2020-10-08 PROCEDURE — C1887: CPT

## 2020-10-08 PROCEDURE — 84100 ASSAY OF PHOSPHORUS: CPT

## 2020-10-08 PROCEDURE — C1876: CPT

## 2020-10-08 PROCEDURE — C1894: CPT

## 2020-10-08 PROCEDURE — 86850 RBC ANTIBODY SCREEN: CPT

## 2020-10-08 PROCEDURE — C9399: CPT

## 2020-10-08 PROCEDURE — C1760: CPT

## 2020-10-08 PROCEDURE — C1769: CPT

## 2020-10-08 PROCEDURE — 86900 BLOOD TYPING SEROLOGIC ABO: CPT

## 2020-10-08 PROCEDURE — C1725: CPT

## 2020-10-08 PROCEDURE — 85027 COMPLETE CBC AUTOMATED: CPT

## 2020-10-08 PROCEDURE — 76000 FLUOROSCOPY <1 HR PHYS/QHP: CPT

## 2020-10-08 PROCEDURE — C1874: CPT

## 2020-10-08 PROCEDURE — 36415 COLL VENOUS BLD VENIPUNCTURE: CPT

## 2020-10-08 RX ADMIN — PANTOPRAZOLE SODIUM 40 MILLIGRAM(S): 20 TABLET, DELAYED RELEASE ORAL at 06:48

## 2020-10-08 RX ADMIN — AMLODIPINE BESYLATE 10 MILLIGRAM(S): 2.5 TABLET ORAL at 05:25

## 2020-10-08 RX ADMIN — Medication 81 MILLIGRAM(S): at 10:30

## 2020-10-08 NOTE — DISCHARGE NOTE NURSING/CASE MANAGEMENT/SOCIAL WORK - PATIENT PORTAL LINK FT
You can access the FollowMyHealth Patient Portal offered by [facility] by registering at the following website: [email or web address]. By joining [facility]’s FollowMyHealth portal, you will also be able to view your health information using other applications (apps) compatible with our system.

## 2020-10-08 NOTE — PROGRESS NOTE ADULT - SUBJECTIVE AND OBJECTIVE BOX
O/N: celina removed @midnight, voided 150cc at 5am  10/7: ALEKSANDR katelyn Amlodipine 10 mg started          ---------------------------------------------------------------------------  PLEASE CHECK WHEN PRESENT:     [  ] Heart Failure     [  ] Acute     [  ] Acute on Chronic     [  ] Chronic  -------------------------------------------------------------------     [  ]Diastolic [HFpEF]     [  ]Systolic [HFrEF]     [  ]Combined [HFpEF & HFrEF]     [  ] afib     [  ] hypertensive heart disease     [x ]Other: CAD s/p CABG(2003)  -------------------------------------------------------------------  [ ] Respiratory failure  [ ] Acute cor pulmonale  [ ] Asthma/COPD Exacerbation  [ ] Pleural effusion  [ ] Aspiration pneumonia  -------------------------------------------------------------------  [  ]ODETTE     [  ]ATN     [  ]Reneal Medullary Necrosis     [  ]Renal Cortical Necrosis     [  ]Other Pathological Lesions:    [  ]CKD 1  [  ]CKD 2  [  ]CKD 3  [  ]CKD 4  [  ]CKD 5  [  ]Other  -------------------------------------------------------------------  [  ]Diabetes  [  ] Diabetic PVD Ulcer  [  ] Neuropathic ulcer to DM  [  ] Diabetes with Nephropathy  [  ] Osteomyelitis due to diabetes  --------------------------------------------------------------------  [  ]Malnutrition: See Nutrition Note  [  ]Cachexia  [  ]Other:   [  ]Supplement Ordered:  [  ]Morbid Obesity (BMI >=40]  ---------------------------------------------------------------------  [ ] Sepsis/severe sepsis/septic shock  [ ] UTI  [ ] Pneumonia  -----------------------------------------------------------------------  [ ] Acidosis/alkalosis  [ ] Fluid overload  [ ] Hypokalemia  [ ] Hyperkalemia  [ ] Hypomagnesemia  [ ] Hypophosphatemia  [ ] Hyperphosphatemia  ------------------------------------------------------------------------  [ ] Acute blood loss anemia  [ ] Post op blood loss anemia  [ ] Iron deficiency anemia  [ ] Anemia due to chronic disease  [ ] Hypercoagulable state  ----------------------------------------------------------------------  [ ] Cerebral infarction  [ ] Transient ischemia attack  [ ] Encephalopathy      70yo M PMH of CAD s/p CABG (2003), laminectomy x4 and L4-S1 spinal fusion, moderate-severe right thigh claudication s/p R KERRI PTA/stent and L SFA/PTA PTA stent, and known expanding AAA presents for elective EVAR, now s/p Endovascular abdominal aortic aneurysm repair (10/7).    -Pain/nausea control prn  -ASA  -Regular diet/IVF  -Home medication as appropriate, holding Valsartan  -Griffin  removed, pending TOV (voids: 150cc)  -Bedrest until POD#1  -AM labs   O/N: patrick removed @midnight, voided 150cc at 5am  10/7: POC okay, Amlodipine 10 mg started      denies abdominal pain or back pain. denies bilateral leg pain or weakness     amLODIPine   Tablet 10  aspirin  chewable 81  tamsulosin 0.4      Allergies    penicillin (Hives)    Intolerances        Vital Signs Last 24 Hrs  T(C): 37.3 (08 Oct 2020 06:26), Max: 37.3 (08 Oct 2020 06:26)  T(F): 99.1 (08 Oct 2020 06:26), Max: 99.1 (08 Oct 2020 06:26)  HR: 75 (08 Oct 2020 05:21) (60 - 94)  BP: 126/81 (08 Oct 2020 05:21) (114/56 - 160/68)  BP(mean): 100 (07 Oct 2020 13:30) (93 - 103)  RR: 16 (08 Oct 2020 05:21) (13 - 20)  SpO2: 98% (08 Oct 2020 05:21) (94% - 100%)  I&O's Summary    07 Oct 2020 07:01  -  08 Oct 2020 07:00  --------------------------------------------------------  IN: 3300 mL / OUT: 2455 mL / NET: 845 mL        Physical Exam:  General:NAD   Pulmonary:b/l breath sounds   Cardiovascular: s1s2 RRR   Abdominal: soft   Extremities:bilateral groins soft   bilateral feet warm to touch, motor and sensation intact   Lt: PDP(M) DP(B)   RT: DP/PT (B)       LABS:                        8.9    6.66  )-----------( 166      ( 08 Oct 2020 06:50 )             28.9     10-07    138  |  106  |  12  ----------------------------<  106<H>  4.6   |  23  |  0.83    Ca    8.7      07 Oct 2020 11:40  Phos  4.2     10-07  Mg     2.1     10-07          Radiology and Additional Studies:      ---------------------------------------------------------------------------  PLEASE CHECK WHEN PRESENT:     [  ] Heart Failure     [  ] Acute     [  ] Acute on Chronic     [  ] Chronic  -------------------------------------------------------------------     [  ]Diastolic [HFpEF]     [  ]Systolic [HFrEF]     [  ]Combined [HFpEF & HFrEF]     [  ] afib     [  ] hypertensive heart disease     [x ]Other: CAD s/p CABG(2003)  -------------------------------------------------------------------  [ ] Respiratory failure  [ ] Acute cor pulmonale  [ ] Asthma/COPD Exacerbation  [ ] Pleural effusion  [ ] Aspiration pneumonia  -------------------------------------------------------------------  [  ]ODETTE     [  ]ATN     [  ]Reneal Medullary Necrosis     [  ]Renal Cortical Necrosis     [  ]Other Pathological Lesions:    [  ]CKD 1  [  ]CKD 2  [  ]CKD 3  [  ]CKD 4  [  ]CKD 5  [  ]Other  -------------------------------------------------------------------  [  ]Diabetes  [  ] Diabetic PVD Ulcer  [  ] Neuropathic ulcer to DM  [  ] Diabetes with Nephropathy  [  ] Osteomyelitis due to diabetes  --------------------------------------------------------------------  [  ]Malnutrition: See Nutrition Note  [  ]Cachexia  [  ]Other:   [  ]Supplement Ordered:  [  ]Morbid Obesity (BMI >=40]  ---------------------------------------------------------------------  [ ] Sepsis/severe sepsis/septic shock  [ ] UTI  [ ] Pneumonia  -----------------------------------------------------------------------  [ ] Acidosis/alkalosis  [ ] Fluid overload  [ ] Hypokalemia  [ ] Hyperkalemia  [ ] Hypomagnesemia  [ ] Hypophosphatemia  [ ] Hyperphosphatemia  ------------------------------------------------------------------------  [ ] Acute blood loss anemia  [ ] Post op blood loss anemia  [ ] Iron deficiency anemia  [ ] Anemia due to chronic disease  [ ] Hypercoagulable state  ----------------------------------------------------------------------  [ ] Cerebral infarction  [ ] Transient ischemia attack  [ ] Encephalopathy      70yo M PMH of CAD s/p CABG (2003), laminectomy x4 and L4-S1 spinal fusion, moderate-severe right thigh claudication s/p R KERRI PTA/stent and L SFA/PTA PTA stent, and known expanding AAA presents for elective EVAR, now s/p Endovascular abdominal aortic aneurysm repair (10/7).    -Pain/nausea control prn  -ASA  -Regular diet/HLIVF  -OOB and ambulate   -Home medication as appropriate, holding Valsartan  -Patrick  removed, passed TOV  -AM labs  DIspo: plan to d/c home today

## 2020-10-11 LAB — SARS-COV-2 N GENE NPH QL NAA+PROBE: NOT DETECTED

## 2020-10-14 DIAGNOSIS — M48.061 SPINAL STENOSIS, LUMBAR REGION WITHOUT NEUROGENIC CLAUDICATION: ICD-10-CM

## 2020-10-14 DIAGNOSIS — F41.9 ANXIETY DISORDER, UNSPECIFIED: ICD-10-CM

## 2020-10-14 DIAGNOSIS — Z79.899 OTHER LONG TERM (CURRENT) DRUG THERAPY: ICD-10-CM

## 2020-10-14 DIAGNOSIS — I10 ESSENTIAL (PRIMARY) HYPERTENSION: ICD-10-CM

## 2020-10-14 DIAGNOSIS — I25.10 ATHEROSCLEROTIC HEART DISEASE OF NATIVE CORONARY ARTERY WITHOUT ANGINA PECTORIS: ICD-10-CM

## 2020-10-14 DIAGNOSIS — E78.5 HYPERLIPIDEMIA, UNSPECIFIED: ICD-10-CM

## 2020-10-14 DIAGNOSIS — Z85.46 PERSONAL HISTORY OF MALIGNANT NEOPLASM OF PROSTATE: ICD-10-CM

## 2020-10-14 DIAGNOSIS — Z79.82 LONG TERM (CURRENT) USE OF ASPIRIN: ICD-10-CM

## 2020-10-14 DIAGNOSIS — I71.4 ABDOMINAL AORTIC ANEURYSM, WITHOUT RUPTURE: ICD-10-CM

## 2020-10-14 DIAGNOSIS — Z88.0 ALLERGY STATUS TO PENICILLIN: ICD-10-CM

## 2020-10-14 DIAGNOSIS — I73.9 PERIPHERAL VASCULAR DISEASE, UNSPECIFIED: ICD-10-CM

## 2020-10-14 DIAGNOSIS — D64.9 ANEMIA, UNSPECIFIED: ICD-10-CM

## 2020-10-14 DIAGNOSIS — N40.0 BENIGN PROSTATIC HYPERPLASIA WITHOUT LOWER URINARY TRACT SYMPTOMS: ICD-10-CM

## 2020-10-14 DIAGNOSIS — K21.9 GASTRO-ESOPHAGEAL REFLUX DISEASE WITHOUT ESOPHAGITIS: ICD-10-CM

## 2020-10-14 DIAGNOSIS — J45.909 UNSPECIFIED ASTHMA, UNCOMPLICATED: ICD-10-CM

## 2020-10-14 DIAGNOSIS — Z95.1 PRESENCE OF AORTOCORONARY BYPASS GRAFT: ICD-10-CM

## 2020-10-20 ENCOUNTER — APPOINTMENT (OUTPATIENT)
Dept: VASCULAR SURGERY | Facility: CLINIC | Age: 70
End: 2020-10-20
Payer: MEDICARE

## 2020-10-20 DIAGNOSIS — I70.203 UNSPECIFIED ATHEROSCLEROSIS OF NATIVE ARTERIES OF EXTREMITIES, BILATERAL LEGS: ICD-10-CM

## 2020-10-20 PROCEDURE — 99024 POSTOP FOLLOW-UP VISIT: CPT

## 2020-10-20 PROCEDURE — 93978 VASCULAR STUDY: CPT

## 2020-10-22 PROBLEM — I70.203 BILATERAL FEMORAL ARTERY STENOSIS: Status: ACTIVE | Noted: 2020-10-22

## 2020-10-26 NOTE — DISCUSSION/SUMMARY
[FreeTextEntry1] : 69yoM w/known h/o AAA recently noted to have grown on CTA, now s/p EVAR/AAA w/perclose groin closures, POD#13.  Pt denies any post-op pain, but still reports weakness and early fatigue in both legs when walking/exercising.  He denies pain in the legs or abdomen.\par \par Duplex today reveals widely patent EVAR/AAA w/o evidence of endoleak, sac measures 5.1x5cm.  Pt will return to office in 6mos for surveillance.\par \par I personally discussed this patient with the Physician Assistant at the time of the visit. I agree with the assessment and plan as written

## 2020-10-26 NOTE — PHYSICAL EXAM
[Normal Thyroid] : the thyroid was normal [Normal Breath Sounds] : Normal breath sounds [Normal Rate and Rhythm] : normal rate and rhythm [1+] : right 1+ [2+] : left 2+ [Ankle Swelling (On Exam)] : present [Ankle Swelling Bilaterally] : bilaterally  [Ankle Swelling On The Right] : mild [No Rash or Lesion] : No rash or lesion [Alert] : alert [Calm] : calm [JVD] : no jugular venous distention  [Varicose Veins Of Lower Extremities] : not present [] : not present [de-identified] : well-nourished, NAD [de-identified] : NC/AT, anicteric [de-identified] : SNTND [de-identified] : FROM throughout, strength 5/5x4, no palpable cords [de-identified] : Groin puncture sites soft, no ecchymosis noted

## 2020-10-26 NOTE — REASON FOR VISIT
[Spouse] : spouse [de-identified] : EVAR/AAA [de-identified] : 10/07/2020 [de-identified] : 13 [de-identified] : 2 [de-identified] : 69yoM w/known h/o AAA recently noted to have grown on CTA, now s/p EVAR/AAA w/perclose groin closures, POD#13.  Pt denies any post-op pain, but still reports weakness and early fatigue in both legs when walking/exercising.  He denies pain in the legs or abdomen.

## 2020-11-09 ENCOUNTER — NON-APPOINTMENT (OUTPATIENT)
Age: 70
End: 2020-11-09

## 2020-11-10 ENCOUNTER — TRANSCRIPTION ENCOUNTER (OUTPATIENT)
Age: 70
End: 2020-11-10

## 2020-11-10 NOTE — H&P ADULT - ASSESSMENT
Pt is a 68 yo M former smoker with PAD/claudication (R KERRI stent, L SFA/PFA stent), lumbar stenosis s/p laminectomy, COPD, HTN, HLD, GERD, BPH, prostate cancer s/p RT, bladder cancer, CAD, pre-DM, bilateral carotid stenosis, and AAA s/p EVAR last month. Pt still has severe bilateral claudication not improved with walking regimen. CTA shows severe stenosis with calcification of bilateral common femoral arteries. For bilateral femoral endarterectomy on 11/11/2020.    Pt is a 68 yo M current smoker with PAD/claudication (R KERRI stent, L SFA/PFA stent), lumbar stenosis s/p laminectomy, COPD, HTN, HLD, GERD, BPH, prostate cancer s/p RT, bladder cancer, CAD, pre-DM, bilateral carotid stenosis, and AAA s/p EVAR last month. Pt still has severe bilateral claudication not improved with walking regimen. CTA shows severe stenosis with calcification of bilateral common femoral arteries. For bilateral femoral endarterectomy on 11/11/2020.

## 2020-11-10 NOTE — H&P ADULT - NSICDXPASTMEDICALHX_GEN_ALL_CORE_FT
PAST MEDICAL HISTORY:  AAA (abdominal aortic aneurysm)     Aortic thromboembolism     Asthma     Atherosclerosis of both carotid arteries     Bladder cancer     BPH without obstruction/lower urinary tract symptoms     Childhood asthma     Coronary artery disease     DJD (degenerative joint disease), lumbar     GERD (gastroesophageal reflux disease)     Hepatitis C     HNP (herniated nucleus pulposus), lumbar     Hyperlipidemia     Hypertension     Lower back pain     Lumbar stenosis with neurogenic claudication     MRSA bacteremia june 2020    Nicotine dependence     Osteomyelitis of spine lumbar    PAD (peripheral artery disease)     Prediabetes     Prostate cancer Radiation TX    PVD (peripheral vascular disease)     Radiculopathy of lumbar region     Subdural abscess

## 2020-11-10 NOTE — PATIENT PROFILE ADULT - NSPROIMPLANTSMEDDEV_GEN_A_NUR
patient has stents in his aorta; stents in lower extremities; and, saúl, screws, and hinge in L1-L5.

## 2020-11-10 NOTE — H&P ADULT - NSICDXPASTSURGICALHX_GEN_ALL_CORE_FT
PAST SURGICAL HISTORY:  H/O laminectomy x 3    History of lumbar fusion     S/P CABG x 3     Surgery, elective Peripheral stents in both legs 2 -left, 1- right

## 2020-11-10 NOTE — H&P ADULT - HISTORY OF PRESENT ILLNESS
Pt is a 68 yo M former smoker with PAD/claudication (R KERRI stent, L SFA/PFA stent), lumbar stenosis s/p laminectomy, COPD, HTN, HLD, GERD, BPH, prostate cancer s/p RT, bladder cancer, CAD, pre-DM, bilateral carotid stenosis, and AAA s/p EVAR last month. Pt still has severe bilateral claudication not improved with walking regimen. CTA shows severe stenosis with calcification of bilateral common femoral arteries. For bilateral femoral endarterectomy on 11/11/2020. Pt is a 68 yo M current smoker with PAD/claudication (R KERRI stent, L SFA/PFA stent), lumbar stenosis s/p laminectomy, COPD, HTN, HLD, GERD, BPH, prostate cancer s/p RT, bladder cancer, CAD, pre-DM, bilateral carotid stenosis, and AAA s/p EVAR last month. Pt still has severe bilateral claudication not improved with walking regimen. CTA shows severe stenosis with calcification of bilateral common femoral arteries. For bilateral femoral endarterectomy on 11/11/2020.     pre op: Hb 8.9, Cr 0.8, EF 75%, stress WNL

## 2020-11-11 ENCOUNTER — INPATIENT (INPATIENT)
Facility: HOSPITAL | Age: 70
LOS: 0 days | Discharge: HOME | DRG: 253 | End: 2020-11-12
Attending: SURGERY | Admitting: SURGERY
Payer: MEDICARE

## 2020-11-11 ENCOUNTER — RESULT REVIEW (OUTPATIENT)
Age: 70
End: 2020-11-11

## 2020-11-11 ENCOUNTER — APPOINTMENT (OUTPATIENT)
Dept: VASCULAR SURGERY | Facility: HOSPITAL | Age: 70
End: 2020-11-11

## 2020-11-11 VITALS
TEMPERATURE: 98 F | OXYGEN SATURATION: 98 % | SYSTOLIC BLOOD PRESSURE: 125 MMHG | WEIGHT: 185.41 LBS | HEIGHT: 68 IN | RESPIRATION RATE: 16 BRPM | DIASTOLIC BLOOD PRESSURE: 75 MMHG | HEART RATE: 82 BPM

## 2020-11-11 DIAGNOSIS — I70.213 ATHEROSCLEROSIS OF NATIVE ARTERIES OF EXTREMITIES WITH INTERMITTENT CLAUDICATION, BILATERAL LEGS: ICD-10-CM

## 2020-11-11 DIAGNOSIS — D62 ACUTE POSTHEMORRHAGIC ANEMIA: ICD-10-CM

## 2020-11-11 DIAGNOSIS — Z72.0 TOBACCO USE: ICD-10-CM

## 2020-11-11 DIAGNOSIS — N40.0 BENIGN PROSTATIC HYPERPLASIA WITHOUT LOWER URINARY TRACT SYMPTOMS: ICD-10-CM

## 2020-11-11 DIAGNOSIS — I25.10 ATHEROSCLEROTIC HEART DISEASE OF NATIVE CORONARY ARTERY WITHOUT ANGINA PECTORIS: ICD-10-CM

## 2020-11-11 DIAGNOSIS — Z41.9 ENCOUNTER FOR PROCEDURE FOR PURPOSES OTHER THAN REMEDYING HEALTH STATE, UNSPECIFIED: Chronic | ICD-10-CM

## 2020-11-11 DIAGNOSIS — Z85.46 PERSONAL HISTORY OF MALIGNANT NEOPLASM OF PROSTATE: ICD-10-CM

## 2020-11-11 DIAGNOSIS — I10 ESSENTIAL (PRIMARY) HYPERTENSION: ICD-10-CM

## 2020-11-11 DIAGNOSIS — E78.5 HYPERLIPIDEMIA, UNSPECIFIED: ICD-10-CM

## 2020-11-11 DIAGNOSIS — J44.9 CHRONIC OBSTRUCTIVE PULMONARY DISEASE, UNSPECIFIED: ICD-10-CM

## 2020-11-11 DIAGNOSIS — R73.03 PREDIABETES: ICD-10-CM

## 2020-11-11 DIAGNOSIS — Z98.890 OTHER SPECIFIED POSTPROCEDURAL STATES: Chronic | ICD-10-CM

## 2020-11-11 DIAGNOSIS — Z95.1 PRESENCE OF AORTOCORONARY BYPASS GRAFT: Chronic | ICD-10-CM

## 2020-11-11 DIAGNOSIS — Z98.1 ARTHRODESIS STATUS: Chronic | ICD-10-CM

## 2020-11-11 DIAGNOSIS — Z85.51 PERSONAL HISTORY OF MALIGNANT NEOPLASM OF BLADDER: ICD-10-CM

## 2020-11-11 DIAGNOSIS — E66.9 OBESITY, UNSPECIFIED: ICD-10-CM

## 2020-11-11 DIAGNOSIS — K21.9 GASTRO-ESOPHAGEAL REFLUX DISEASE WITHOUT ESOPHAGITIS: ICD-10-CM

## 2020-11-11 LAB
ANION GAP SERPL CALC-SCNC: 12 MMOL/L — SIGNIFICANT CHANGE UP (ref 5–17)
APTT BLD: 30.5 SEC — SIGNIFICANT CHANGE UP (ref 27.5–35.5)
BUN SERPL-MCNC: 20 MG/DL — SIGNIFICANT CHANGE UP (ref 7–23)
CALCIUM SERPL-MCNC: 8.8 MG/DL — SIGNIFICANT CHANGE UP (ref 8.4–10.5)
CHLORIDE SERPL-SCNC: 106 MMOL/L — SIGNIFICANT CHANGE UP (ref 96–108)
CO2 SERPL-SCNC: 22 MMOL/L — SIGNIFICANT CHANGE UP (ref 22–31)
CREAT SERPL-MCNC: 0.97 MG/DL — SIGNIFICANT CHANGE UP (ref 0.5–1.3)
GLUCOSE SERPL-MCNC: 134 MG/DL — HIGH (ref 70–99)
HCT VFR BLD CALC: 24.2 % — LOW (ref 39–50)
HGB BLD-MCNC: 7.4 G/DL — LOW (ref 13–17)
MAGNESIUM SERPL-MCNC: 1.9 MG/DL — SIGNIFICANT CHANGE UP (ref 1.6–2.6)
MCHC RBC-ENTMCNC: 24.3 PG — LOW (ref 27–34)
MCHC RBC-ENTMCNC: 30.6 GM/DL — LOW (ref 32–36)
MCV RBC AUTO: 79.6 FL — LOW (ref 80–100)
NRBC # BLD: 0 /100 WBCS — SIGNIFICANT CHANGE UP (ref 0–0)
PHOSPHATE SERPL-MCNC: 3.3 MG/DL — SIGNIFICANT CHANGE UP (ref 2.5–4.5)
PLATELET # BLD AUTO: 193 K/UL — SIGNIFICANT CHANGE UP (ref 150–400)
POTASSIUM SERPL-MCNC: 4.3 MMOL/L — SIGNIFICANT CHANGE UP (ref 3.5–5.3)
POTASSIUM SERPL-SCNC: 4.3 MMOL/L — SIGNIFICANT CHANGE UP (ref 3.5–5.3)
RBC # BLD: 3.04 M/UL — LOW (ref 4.2–5.8)
RBC # FLD: 17.6 % — HIGH (ref 10.3–14.5)
SODIUM SERPL-SCNC: 140 MMOL/L — SIGNIFICANT CHANGE UP (ref 135–145)
WBC # BLD: 12.97 K/UL — HIGH (ref 3.8–10.5)
WBC # FLD AUTO: 12.97 K/UL — HIGH (ref 3.8–10.5)

## 2020-11-11 PROCEDURE — 35371 RECHANNELING OF ARTERY: CPT | Mod: 50,58,GC

## 2020-11-11 PROCEDURE — 88304 TISSUE EXAM BY PATHOLOGIST: CPT | Mod: 26

## 2020-11-11 PROCEDURE — 35371 RECHANNELING OF ARTERY: CPT | Mod: 80,50,GC,58

## 2020-11-11 PROCEDURE — 88311 DECALCIFY TISSUE: CPT | Mod: 26

## 2020-11-11 RX ORDER — OXYCODONE AND ACETAMINOPHEN 5; 325 MG/1; MG/1
1 TABLET ORAL EVERY 4 HOURS
Refills: 0 | Status: DISCONTINUED | OUTPATIENT
Start: 2020-11-11 | End: 2020-11-12

## 2020-11-11 RX ORDER — AMLODIPINE BESYLATE 2.5 MG/1
10 TABLET ORAL DAILY
Refills: 0 | Status: DISCONTINUED | OUTPATIENT
Start: 2020-11-11 | End: 2020-11-12

## 2020-11-11 RX ORDER — ASPIRIN/CALCIUM CARB/MAGNESIUM 324 MG
1 TABLET ORAL
Qty: 0 | Refills: 0 | DISCHARGE

## 2020-11-11 RX ORDER — PANTOPRAZOLE SODIUM 20 MG/1
40 TABLET, DELAYED RELEASE ORAL
Refills: 0 | Status: DISCONTINUED | OUTPATIENT
Start: 2020-11-11 | End: 2020-11-12

## 2020-11-11 RX ORDER — ACETAMINOPHEN 500 MG
650 TABLET ORAL EVERY 6 HOURS
Refills: 0 | Status: DISCONTINUED | OUTPATIENT
Start: 2020-11-11 | End: 2020-11-12

## 2020-11-11 RX ORDER — TAMSULOSIN HYDROCHLORIDE 0.4 MG/1
0.4 CAPSULE ORAL AT BEDTIME
Refills: 0 | Status: DISCONTINUED | OUTPATIENT
Start: 2020-11-11 | End: 2020-11-12

## 2020-11-11 RX ORDER — SODIUM CHLORIDE 9 MG/ML
1000 INJECTION, SOLUTION INTRAVENOUS
Refills: 0 | Status: DISCONTINUED | OUTPATIENT
Start: 2020-11-11 | End: 2020-11-12

## 2020-11-11 RX ORDER — ASPIRIN/CALCIUM CARB/MAGNESIUM 324 MG
81 TABLET ORAL DAILY
Refills: 0 | Status: DISCONTINUED | OUTPATIENT
Start: 2020-11-11 | End: 2020-11-12

## 2020-11-11 RX ORDER — ALBUTEROL 90 UG/1
2 AEROSOL, METERED ORAL
Qty: 0 | Refills: 0 | DISCHARGE

## 2020-11-11 RX ORDER — CEFAZOLIN SODIUM 1 G
2000 VIAL (EA) INJECTION EVERY 8 HOURS
Refills: 0 | Status: DISCONTINUED | OUTPATIENT
Start: 2020-11-11 | End: 2020-11-12

## 2020-11-11 RX ORDER — ATORVASTATIN CALCIUM 80 MG/1
80 TABLET, FILM COATED ORAL AT BEDTIME
Refills: 0 | Status: DISCONTINUED | OUTPATIENT
Start: 2020-11-11 | End: 2020-11-12

## 2020-11-11 RX ORDER — TAMSULOSIN HYDROCHLORIDE 0.4 MG/1
0 CAPSULE ORAL
Qty: 0 | Refills: 0 | DISCHARGE

## 2020-11-11 RX ORDER — ALBUTEROL 90 UG/1
2 AEROSOL, METERED ORAL EVERY 8 HOURS
Refills: 0 | Status: DISCONTINUED | OUTPATIENT
Start: 2020-11-11 | End: 2020-11-12

## 2020-11-11 RX ORDER — BUPIVACAINE 13.3 MG/ML
20 INJECTION, SUSPENSION, LIPOSOMAL INFILTRATION ONCE
Refills: 0 | Status: DISCONTINUED | OUTPATIENT
Start: 2020-11-11 | End: 2020-11-12

## 2020-11-11 RX ORDER — ALPRAZOLAM 0.25 MG
0 TABLET ORAL
Qty: 0 | Refills: 0 | DISCHARGE

## 2020-11-11 RX ORDER — ROSUVASTATIN CALCIUM 5 MG/1
0 TABLET ORAL
Qty: 0 | Refills: 0 | DISCHARGE

## 2020-11-11 RX ADMIN — OXYCODONE AND ACETAMINOPHEN 1 TABLET(S): 5; 325 TABLET ORAL at 20:56

## 2020-11-11 RX ADMIN — ATORVASTATIN CALCIUM 80 MILLIGRAM(S): 80 TABLET, FILM COATED ORAL at 22:50

## 2020-11-11 RX ADMIN — TAMSULOSIN HYDROCHLORIDE 0.4 MILLIGRAM(S): 0.4 CAPSULE ORAL at 22:50

## 2020-11-11 RX ADMIN — Medication 100 MILLIGRAM(S): at 21:11

## 2020-11-11 NOTE — PACU DISCHARGE NOTE - COMMENTS
Pt AOx4, VSS,afebrile. No sign of distress or SOB noted. 02 sat 99% on 2LNC.No further c/o pain after Dilaudid IVP given as ordered. IVL patent and intact, no infiltration or phlebitis noted, infusing LR @85 and 1 unit PRBC, port surgery H/H 7.2/26, 1 unit PRBC initiated and monitored within  15 mins , no reaction noted. B/L groin dressing intact, with right groin with some blood on dressing , vascular team came to see pt at bsd, no further bleeding after. Pulses positive ti dorsalis and post tibia. Pt able to seat 35-45degreee as per Vascular team. Report given to PAIGE Dorantes.

## 2020-11-11 NOTE — PROGRESS NOTE ADULT - SUBJECTIVE AND OBJECTIVE BOX
POST-OPERATIVE NOTE    Procedure: Bilateral femoral endarterectomy     Surgeon: Dr. Hdez     S: Pt has no complaints. Denies CP, SOB, N/V. Pain controlled with medication.    O:  T(C): 36.6 (11-11-20 @ 16:53), Max: 36.6 (11-11-20 @ 15:47)  T(F): 97.8 (11-11-20 @ 16:53), Max: 97.9 (11-11-20 @ 15:47)  HR: 79 (11-11-20 @ 17:23) (72 - 79)  BP: 124/62 (11-11-20 @ 17:23) (98/55 - 128/60)  RR: 21 (11-11-20 @ 17:23) (12 - 21)  SpO2: 100% (11-11-20 @ 17:23) (97% - 100%)                        7.4    12.97 )-----------( 193      ( 11 Nov 2020 16:28 )             24.2     11-11    140  |  106  |  20  ----------------------------<  134<H>  4.3   |  22  |  0.97    Ca    8.8      11 Nov 2020 16:28  Phos  3.3     11-11  Mg     1.9     11-11        Gen: NAD, resting comfortably in bed, sitting up   C/V: NSR  Pulm: Nonlabored breathing, no respiratory distress  Abd: soft, NT/ND, obese  Extrem:   Vascular: R groin soft, minimal blood seepage through gauze with overlying tegaderm; L groin soft with overlying dry guaze and tegaderm   Neuro: A&Ox3, answers questions appropriately     A/P: 69yMale s/p bilateral femoral endarterectomy   Diet: CLD, advance as tolerated   Bedrest   Pain/nausea control  Asa 81 QD   3 doses cefazolin 2G q8  LR @85   Home meds as appropriate   Tylenol, Percocet   AM labs    Plan discussed with attending and chief resident.   ____________________________________________________  Ninfa Portelli, PGY1   Vascular Surgery POST-OPERATIVE NOTE    Procedure: Bilateral femoral endarterectomy     Surgeon: Dr. Hdez     S: Pt complains of some bilateral groin pain and dry mouth. Endorses feeling well overall. Joking at bedside and states he is comfortable. Denies discomfort from the patrick catheter. Denies CP, SOB, N/V, denies pain of the feet and discomfort of the b/l lower calves.     O:  T(C): 36.6 (11-11-20 @ 16:53), Max: 36.6 (11-11-20 @ 15:47)  T(F): 97.8 (11-11-20 @ 16:53), Max: 97.9 (11-11-20 @ 15:47)  HR: 79 (11-11-20 @ 17:23) (72 - 79)  BP: 124/62 (11-11-20 @ 17:23) (98/55 - 128/60)  RR: 21 (11-11-20 @ 17:23) (12 - 21)  SpO2: 100% (11-11-20 @ 17:23) (97% - 100%)                        7.4    12.97 )-----------( 193      ( 11 Nov 2020 16:28 )             24.2     11-11    140  |  106  |  20  ----------------------------<  134<H>  4.3   |  22  |  0.97    Ca    8.8      11 Nov 2020 16:28  Phos  3.3     11-11  Mg     1.9     11-11        Gen: NAD, resting comfortably in bed, sitting up   C/V: NSR  Pulm: Nonlabored breathing, no respiratory distress  Abd: soft, NT/ND, obese  Extrem: WWP, no edema   Vascular: R groin soft, minimal blood seepage through gauze with overlying tegaderm; L groin soft with overlying dry guaze and tegaderm: R DP monophasic, R PT biphasic; L DP biphasic, L PT biphasic   Neuro: A&Ox3, answers questions appropriately     A/P: 69yMale s/p bilateral femoral endarterectomy   Diet: CLD, advance as tolerated   Bedrest   Pain/nausea control  Asa 81 QD   3 doses cefazolin 2G q8  LR @85   Home meds as appropriate   Tylenol, Percocet   AM labs    Plan discussed with attending and chief resident.   ____________________________________________________  Ninfa Perkins, PGY1   Vascular Surgery

## 2020-11-11 NOTE — BRIEF OPERATIVE NOTE - OPERATION/FINDINGS
Bilateral femoral endarterectomy with patch angioplasty repair. Hemostasis achieved, incisions closed primarily.

## 2020-11-11 NOTE — BRIEF OPERATIVE NOTE - NSICDXBRIEFPROCEDURE_GEN_ALL_CORE_FT
PROCEDURES:  Endarterectomy and angioplasty of femoral artery 11-Nov-2020 16:18:54 BILATERAL Nuno Zarate

## 2020-11-12 ENCOUNTER — TRANSCRIPTION ENCOUNTER (OUTPATIENT)
Age: 70
End: 2020-11-12

## 2020-11-12 VITALS — TEMPERATURE: 99 F

## 2020-11-12 LAB
ANION GAP SERPL CALC-SCNC: 13 MMOL/L — SIGNIFICANT CHANGE UP (ref 5–17)
BASOPHILS # BLD AUTO: 0.01 K/UL — SIGNIFICANT CHANGE UP (ref 0–0.2)
BASOPHILS NFR BLD AUTO: 0.1 % — SIGNIFICANT CHANGE UP (ref 0–2)
BUN SERPL-MCNC: 16 MG/DL — SIGNIFICANT CHANGE UP (ref 7–23)
CALCIUM SERPL-MCNC: 9 MG/DL — SIGNIFICANT CHANGE UP (ref 8.4–10.5)
CHLORIDE SERPL-SCNC: 108 MMOL/L — SIGNIFICANT CHANGE UP (ref 96–108)
CO2 SERPL-SCNC: 22 MMOL/L — SIGNIFICANT CHANGE UP (ref 22–31)
CREAT SERPL-MCNC: 0.86 MG/DL — SIGNIFICANT CHANGE UP (ref 0.5–1.3)
EOSINOPHIL # BLD AUTO: 0 K/UL — SIGNIFICANT CHANGE UP (ref 0–0.5)
EOSINOPHIL NFR BLD AUTO: 0 % — SIGNIFICANT CHANGE UP (ref 0–6)
GLUCOSE SERPL-MCNC: 106 MG/DL — HIGH (ref 70–99)
HCT VFR BLD CALC: 26.5 % — LOW (ref 39–50)
HGB BLD-MCNC: 8.4 G/DL — LOW (ref 13–17)
IMM GRANULOCYTES NFR BLD AUTO: 0.3 % — SIGNIFICANT CHANGE UP (ref 0–1.5)
LYMPHOCYTES # BLD AUTO: 1.66 K/UL — SIGNIFICANT CHANGE UP (ref 1–3.3)
LYMPHOCYTES # BLD AUTO: 15.7 % — SIGNIFICANT CHANGE UP (ref 13–44)
MAGNESIUM SERPL-MCNC: 2 MG/DL — SIGNIFICANT CHANGE UP (ref 1.6–2.6)
MCHC RBC-ENTMCNC: 25.1 PG — LOW (ref 27–34)
MCHC RBC-ENTMCNC: 31.7 GM/DL — LOW (ref 32–36)
MCV RBC AUTO: 79.1 FL — LOW (ref 80–100)
MONOCYTES # BLD AUTO: 0.94 K/UL — HIGH (ref 0–0.9)
MONOCYTES NFR BLD AUTO: 8.9 % — SIGNIFICANT CHANGE UP (ref 2–14)
NEUTROPHILS # BLD AUTO: 7.96 K/UL — HIGH (ref 1.8–7.4)
NEUTROPHILS NFR BLD AUTO: 75 % — SIGNIFICANT CHANGE UP (ref 43–77)
NRBC # BLD: 0 /100 WBCS — SIGNIFICANT CHANGE UP (ref 0–0)
PHOSPHATE SERPL-MCNC: 3.1 MG/DL — SIGNIFICANT CHANGE UP (ref 2.5–4.5)
PLATELET # BLD AUTO: 195 K/UL — SIGNIFICANT CHANGE UP (ref 150–400)
POTASSIUM SERPL-MCNC: 4.1 MMOL/L — SIGNIFICANT CHANGE UP (ref 3.5–5.3)
POTASSIUM SERPL-SCNC: 4.1 MMOL/L — SIGNIFICANT CHANGE UP (ref 3.5–5.3)
RBC # BLD: 3.35 M/UL — LOW (ref 4.2–5.8)
RBC # FLD: 17.4 % — HIGH (ref 10.3–14.5)
SODIUM SERPL-SCNC: 143 MMOL/L — SIGNIFICANT CHANGE UP (ref 135–145)
WBC # BLD: 10.6 K/UL — HIGH (ref 3.8–10.5)
WBC # FLD AUTO: 10.6 K/UL — HIGH (ref 3.8–10.5)

## 2020-11-12 PROCEDURE — 99233 SBSQ HOSP IP/OBS HIGH 50: CPT | Mod: GC

## 2020-11-12 RX ORDER — ACETAMINOPHEN 500 MG
2 TABLET ORAL
Qty: 0 | Refills: 0 | DISCHARGE
Start: 2020-11-12

## 2020-11-12 RX ORDER — OXYCODONE AND ACETAMINOPHEN 5; 325 MG/1; MG/1
1 TABLET ORAL
Qty: 32 | Refills: 0
Start: 2020-11-12 | End: 2020-11-19

## 2020-11-12 RX ORDER — OXYCODONE AND ACETAMINOPHEN 5; 325 MG/1; MG/1
1 TABLET ORAL
Qty: 24 | Refills: 0
Start: 2020-11-12 | End: 2020-11-17

## 2020-11-12 RX ORDER — AMLODIPINE AND VALSARTAN 5; 320 MG/1; MG/1
1 TABLET, FILM COATED ORAL
Qty: 0 | Refills: 0 | DISCHARGE

## 2020-11-12 RX ORDER — AMLODIPINE BESYLATE 2.5 MG/1
1 TABLET ORAL
Qty: 30 | Refills: 0
Start: 2020-11-12 | End: 2020-12-11

## 2020-11-12 RX ADMIN — AMLODIPINE BESYLATE 10 MILLIGRAM(S): 2.5 TABLET ORAL at 06:07

## 2020-11-12 RX ADMIN — Medication 650 MILLIGRAM(S): at 03:43

## 2020-11-12 RX ADMIN — Medication 100 MILLIGRAM(S): at 04:12

## 2020-11-12 RX ADMIN — Medication 650 MILLIGRAM(S): at 04:36

## 2020-11-12 RX ADMIN — PANTOPRAZOLE SODIUM 40 MILLIGRAM(S): 20 TABLET, DELAYED RELEASE ORAL at 06:07

## 2020-11-12 NOTE — PROVIDER CONTACT NOTE (OTHER) - ACTION/TREATMENT ORDERED:
DUSTIN Hahn made aware, per DUSTIN Hahn continue to reassess groins. Pt educated on plan of care, pt verbalized understanding of teaching

## 2020-11-12 NOTE — DISCHARGE NOTE PROVIDER - PROVIDER TOKENS
PROVIDER:[TOKEN:[[id number]]] PROVIDER:[TOKEN:[[id number]],SCHEDULEDAPPT:[11/17/2020],SCHEDULEDAPPTTIME:[09:30 AM]]

## 2020-11-12 NOTE — PROGRESS NOTE ADULT - ASSESSMENT
69M w h/o PAD, R KERRI stent, L SFA?PFA stent (recent AAA s/p EVAR), COPD, HTN, HLD, Pre-DM, Tobacco use d/o - 1ppd, here for elective bilateral femoral endarterectomy w Dr. Hdez 11/12, s/p 1u RBCs.    #Post-op state - pain controlled.   #Microcytic anemia - hgb 8.4 from 7.4 (rec'd 1u RBC intraoperatively). Does not appear symptomatic.   #COPD - not in exacerbation - PRN albuterol  #PAD - s/p procedure above. on Statin. Claudication pain while walking improved per patient  #Tobacco use d/o - 1ppd. pt is motivated to quit  #HTN - BP at target. On amlodipine  #BPH - chronic. at baseline - on flomax  #GERD - chronic. On PPI    Recommendations  --From medical standpoint, pt optimized for disposition  --Recommend repeat CBC as outpatient

## 2020-11-12 NOTE — DISCHARGE NOTE PROVIDER - NSDCCPCAREPLAN_GEN_ALL_CORE_FT
PRINCIPAL DISCHARGE DIAGNOSIS  Diagnosis: Severe claudication  Assessment and Plan of Treatment:       SECONDARY DISCHARGE DIAGNOSES  Diagnosis: Hyperlipidemia  Assessment and Plan of Treatment:     Diagnosis: History of gastroesophageal reflux (GERD)  Assessment and Plan of Treatment:     Diagnosis: PAD (peripheral artery disease)  Assessment and Plan of Treatment:     Diagnosis: Hypertension  Assessment and Plan of Treatment:     Diagnosis: Prediabetes  Assessment and Plan of Treatment:     Diagnosis: CAD (coronary artery disease)  Assessment and Plan of Treatment:     Diagnosis: History of COPD  Assessment and Plan of Treatment:

## 2020-11-12 NOTE — DISCHARGE NOTE NURSING/CASE MANAGEMENT/SOCIAL WORK - NSDCPEEMAIL_GEN_ALL_CORE
[facility] for Tobacco Control email [email or web address]

## 2020-11-12 NOTE — PROVIDER CONTACT NOTE (OTHER) - ASSESSMENT
Pt's VSS, pt alert/oriented x3. Pt's r groin with increase serosanginous drainage beyond border mapped out at beginning of shift. L groin soft, no increased drainage but painful to palpatation. Pt with +2 post tibial pulses, +1 pedal pulses

## 2020-11-12 NOTE — DISCHARGE NOTE PROVIDER - HOSPITAL COURSE
Patient is a 68 yo M current smoker with PAD/claudication (R KERRI stent, L SFA/PFA stent), lumbar stenosis s/p laminectomy, COPD, HTN, HLD, GERD, BPH, prostate cancer s/p RT, bladder cancer, CAD, pre-DM, bilateral carotid stenosis, and AAA s/p EVAR last month. Pt still had severe bilateral claudication not improved with walking regimen. CTA showed severe stenosis with calcification of bilateral common femoral arteries. On 11/11/2020 he underwent bilateral femoral endarterectomy with patch angioplasty repair. He tolerated the procedure well. On POD#1 he is ambulating, voiding freely, his pain is well controlled and his labs are wnl, patient ready for discharge with outpatient follow up.

## 2020-11-12 NOTE — DISCHARGE NOTE PROVIDER - CARE PROVIDER_API CALL
Jayshree Hdez  SURGERY  [address], [place] [facility]  Phone: [phone]  Fax: [phone]  Follow Up Time:    Jayshree Hdez  SURGERY  [address], [place] [address]  Phone: [phone]  Fax: [phone]  Scheduled Appointment: 11/17/2020 09:30 AM

## 2020-11-12 NOTE — DISCHARGE NOTE NURSING/CASE MANAGEMENT/SOCIAL WORK - PATIENT PORTAL LINK FT
You can access the FollowMyHealth Patient Portal offered by [facility] [place] by registering at the following website: [email or web address]. By joining [employer]’s FollowMyHealth portal, you will also be able to view your health information using other applications (apps) compatible with our system.

## 2020-11-12 NOTE — DISCHARGE NOTE PROVIDER - NSDCCPTREATMENT_GEN_ALL_CORE_FT
PRINCIPAL PROCEDURE  Procedure: Endarterectomy and angioplasty of femoral artery  Findings and Treatment: BILATERAL

## 2020-11-12 NOTE — PROGRESS NOTE ADULT - SUBJECTIVE AND OBJECTIVE BOX
O/N: ANGEL. VSS pass TOV >400                                A/P: 69yMale s/p bilateral femoral endarterectomy     Diet: Dash  Bedrest   Pain/nausea control  Asa 81 QD   3 doses cefazolin 2G q8  Home meds as appropriate Tylenol, Percocet   AM labs   O/N: ANGEL. VSS pass TOV >400      Subjective: this morning     ROS:   Denies Headache, blurred vision, Chest Pain, SOB, Abdominal pain, nausea or vomiting     Social   ceFAZolin   IVPB 2000  amLODIPine   Tablet 10  aspirin  chewable 81  ceFAZolin   IVPB 2000  tamsulosin 0.4      Allergies    penicillin (Hives)    Intolerances        Vital Signs Last 24 Hrs  T(C): 36.5 (12 Nov 2020 05:45), Max: 37.4 (11 Nov 2020 21:17)  T(F): 97.7 (12 Nov 2020 05:45), Max: 99.3 (11 Nov 2020 21:17)  HR: 85 (12 Nov 2020 06:00) (72 - 89)  BP: 163/70 (12 Nov 2020 06:00) (98/55 - 163/70)  BP(mean): 90 (11 Nov 2020 21:32) (73 - 97)  RR: 20 (12 Nov 2020 06:00) (11 - 21)  SpO2: 98% (12 Nov 2020 06:00) (94% - 100%)  I&O's Summary    11 Nov 2020 07:01  -  12 Nov 2020 07:00  --------------------------------------------------------  IN: 3625 mL / OUT: 2065 mL / NET: 1560 mL        Physical Exam:  General:  Pulmonary:  Cardiovascular:  Abdominal:  Extremities:  Pulses:   Right:                                                                          Left:  FEM [ ]2+ [ ]1+ [ ]doppler                                             FEM [ ]2+ [ ]1+ [ ]doppler    POP [ ]2+ [ ]1+ [ ]doppler                                             POP [ ]2+ [ ]1+ [ ]doppler    DP [ ]2+ [ ]1+ [ ]doppler                                                DP [ ]2+ [ ]1+ [ ]doppler  PT[ ]2+ [ ]1+ [ ]doppler                                                  PT [ ]2+ [ ]1+ [ ]doppler      LABS:                        7.4    12.97 )-----------( 193      ( 11 Nov 2020 16:28 )             24.2     11-11    140  |  106  |  20  ----------------------------<  134<H>  4.3   |  22  |  0.97    Ca    8.8      11 Nov 2020 16:28  Phos  3.3     11-11  Mg     1.9     11-11      PTT - ( 11 Nov 2020 16:28 )  PTT:30.5 sec    Radiology and Additional Studies:    A/P: 69y YO Male      Neuro:     Card:     Resp:     GI:     :     Heme:     ID:    Endo:     renal:     Vascular     Dispo:     DVT Ppx:                               A/P: 69yMale s/p bilateral femoral endarterectomy     Diet: Dash  Bedrest   Pain/nausea control  Asa 81 QD   3 doses cefazolin 2G q8  Home meds as appropriate Tylenol, Percocet   AM labs   O/N: ANGEL. VSS pass TOV >400      Subjective: this morning patient resting in bed. Denies bilateral groin swelling or pain. Denies pain in bilateral feet at rest. Denies CP, SOB, back pain     ROS:   Denies Headache, blurred vision, Chest Pain, SOB, Abdominal pain, nausea or vomiting     Social   ceFAZolin   IVPB 2000  amLODIPine   Tablet 10  aspirin  chewable 81  ceFAZolin   IVPB 2000  tamsulosin 0.4      Allergies    penicillin (Hives)    Intolerances        Vital Signs Last 24 Hrs  T(C): 36.5 (12 Nov 2020 05:45), Max: 37.4 (11 Nov 2020 21:17)  T(F): 97.7 (12 Nov 2020 05:45), Max: 99.3 (11 Nov 2020 21:17)  HR: 85 (12 Nov 2020 06:00) (72 - 89)  BP: 163/70 (12 Nov 2020 06:00) (98/55 - 163/70)  BP(mean): 90 (11 Nov 2020 21:32) (73 - 97)  RR: 20 (12 Nov 2020 06:00) (11 - 21)  SpO2: 98% (12 Nov 2020 06:00) (94% - 100%)  I&O's Summary    11 Nov 2020 07:01  -  12 Nov 2020 07:00  --------------------------------------------------------  IN: 3625 mL / OUT: 2065 mL / NET: 1560 mL        Physical Exam:  General: NAD  Pulmonary:b/l breath sounds   Cardiovascular: s1s2 RRR  Abdominal:soft   Extremities: b/l groins soft. no ecchymosis. Incisions clean and dry.   Pulses: Rt DP palpable   Lt PTpalpabl      LABS:                        7.4    12.97 )-----------( 193      ( 11 Nov 2020 16:28 )             24.2     11-11    140  |  106  |  20  ----------------------------<  134<H>  4.3   |  22  |  0.97    Ca    8.8      11 Nov 2020 16:28  Phos  3.3     11-11  Mg     1.9     11-11          PTT - ( 11 Nov 2020 16:28 )  PTT:30.5 sec    Radiology and Additional Studies:        ---------------------------------------------------------------------------  PLEASE CHECK WHEN PRESENT:     [  ]Heart Failure     [  ] Acute     [  ] Acute on Chronic     [  ] Chronic  -------------------------------------------------------------------     [  ]Diastolic [HFpEF]     [  ]Systolic [HFrEF]     [  ]Combined [HFpEF & HFrEF]  .................................................................................     [  ]Other:     [ ] Pulmonary Hypertension     [ ] Afib     [x ] Hypertensive Heart Disease  -------------------------------------------------------------------  [ ] Respiratory failure  [ ] Acute cor pulmonale  [ ] Asthma/COPD Exacerbation  [ ] Pleural effusion  [ ] Aspiration pneumonia  [ ] Obstructive Sleep Apnea  -------------------------------------------------------------------  [  ]ODETTE     [  ]ATN     [  ]Reneal Medullary Necrosis     [  ]Renal Cortical Necrosis     [  ]Other Pathological Lesions:    [  ]CKD 1  [  ]CKD 2  [  ]CKD 3  [  ]CKD 4  [  ]CKD 5  [  ]Other  -------------------------------------------------------------------  [  ]Diabetes  [  ] Diabetic PVD Ulcer  [  ] Neuropathic ulcer to DM  [  ] Diabetes with Nephropathy  [  ] Osteomyelitis due to diabetes  [  ] Hyperglycemia   [  ]hypoglycemia   --------------------------------------------------------------------  [  ]Malnutrition: See Nutrition Note  [  ]Cachexia  [  ]Other:   [  ]Supplement Ordered:  [  ]Morbid Obesity (BMI >=40]  ---------------------------------------------------------------------  [ ] Sepsis/severe sepsis/septic shock  [ ] Noninfectious SIRS  [ ] UTI  [ ] Pneumonia  -----------------------------------------------------------------------  [ ] Acidosis/alkalosis  [ ] Fluid overload  [ ] Hypokalemia  [ ] Hyperkalemia  [ ] Hypomagnesemia  [ ] Hypophosphatemia  [ ] Hyperphosphatemia  ------------------------------------------------------------------------  [ ] Acute blood loss anemia  [ ] Post op blood loss anemia  [ ] Iron deficiency anemia  [ ] Anemia due to chronic disease  [ ] Hypercoagulable state  [ ] Thrombocytopenia  ----------------------------------------------------------------------  [ ] Cerebral infarction  [ ] Transient ischemia attack  [ ] Encephalopathy - Toxic or Metabolic              A/P: 69yMale s/p bilateral femoral endarterectomy       Neuro:   - tylenol and percocet for pain control     Card:   -continue home norvasc and home Lipitor   Resp:   -no active issues   -home inhalers   GI:   -diet restarted   :   -passed TOV   Heme:   -Continue ASA   ID:  -ancef post-op     Vascular:   -Continue ASA   -    Dispo:     DVT Ppx:               A/P:     Diet: Dash  Bedrest   Pain/nausea control  Asa 81 QD   3 doses cefazolin 2G q8  Home meds as appropriate Tylenol, Percocet   AM labs   O/N: ANGEL. VSS pass TOV >400      Subjective: this morning patient resting in bed. Denies bilateral groin swelling or pain. Denies pain in bilateral feet at rest. Denies CP, SOB, back pain     ROS:   Denies Headache, blurred vision, Chest Pain, SOB, Abdominal pain, nausea or vomiting     Social   ceFAZolin   IVPB 2000  amLODIPine   Tablet 10  aspirin  chewable 81  ceFAZolin   IVPB 2000  tamsulosin 0.4      Allergies    penicillin (Hives)    Intolerances        Vital Signs Last 24 Hrs  T(C): 36.5 (12 Nov 2020 05:45), Max: 37.4 (11 Nov 2020 21:17)  T(F): 97.7 (12 Nov 2020 05:45), Max: 99.3 (11 Nov 2020 21:17)  HR: 85 (12 Nov 2020 06:00) (72 - 89)  BP: 163/70 (12 Nov 2020 06:00) (98/55 - 163/70)  BP(mean): 90 (11 Nov 2020 21:32) (73 - 97)  RR: 20 (12 Nov 2020 06:00) (11 - 21)  SpO2: 98% (12 Nov 2020 06:00) (94% - 100%)  I&O's Summary    11 Nov 2020 07:01  -  12 Nov 2020 07:00  --------------------------------------------------------  IN: 3625 mL / OUT: 2065 mL / NET: 1560 mL        Physical Exam:  General: NAD  Pulmonary:b/l breath sounds   Cardiovascular: s1s2 RRR  Abdominal:soft   Extremities: b/l groins soft. no ecchymosis. Incisions clean and dry.   Pulses: Rt DP palpable   Lt PTpalpabl      LABS:                        7.4    12.97 )-----------( 193      ( 11 Nov 2020 16:28 )             24.2     11-11    140  |  106  |  20  ----------------------------<  134<H>  4.3   |  22  |  0.97    Ca    8.8      11 Nov 2020 16:28  Phos  3.3     11-11  Mg     1.9     11-11          PTT - ( 11 Nov 2020 16:28 )  PTT:30.5 sec    Radiology and Additional Studies:        ---------------------------------------------------------------------------  PLEASE CHECK WHEN PRESENT:     [  ]Heart Failure     [  ] Acute     [  ] Acute on Chronic     [  ] Chronic  -------------------------------------------------------------------     [  ]Diastolic [HFpEF]     [  ]Systolic [HFrEF]     [  ]Combined [HFpEF & HFrEF]  .................................................................................     [  ]Other:     [ ] Pulmonary Hypertension     [ ] Afib     [x ] Hypertensive Heart Disease  -------------------------------------------------------------------  [ ] Respiratory failure  [ ] Acute cor pulmonale  [ ] Asthma/COPD Exacerbation  [ ] Pleural effusion  [ ] Aspiration pneumonia  [ ] Obstructive Sleep Apnea  -------------------------------------------------------------------  [  ]ODETTE     [  ]ATN     [  ]Reneal Medullary Necrosis     [  ]Renal Cortical Necrosis     [  ]Other Pathological Lesions:    [  ]CKD 1  [  ]CKD 2  [  ]CKD 3  [  ]CKD 4  [  ]CKD 5  [  ]Other  -------------------------------------------------------------------  [  ]Diabetes  [  ] Diabetic PVD Ulcer  [  ] Neuropathic ulcer to DM  [  ] Diabetes with Nephropathy  [  ] Osteomyelitis due to diabetes  [  ] Hyperglycemia   [  ]hypoglycemia   --------------------------------------------------------------------  [  ]Malnutrition: See Nutrition Note  [  ]Cachexia  [  ]Other:   [  ]Supplement Ordered:  [  ]Morbid Obesity (BMI >=40]  ---------------------------------------------------------------------  [ ] Sepsis/severe sepsis/septic shock  [ ] Noninfectious SIRS  [ ] UTI  [ ] Pneumonia  -----------------------------------------------------------------------  [ ] Acidosis/alkalosis  [ ] Fluid overload  [ ] Hypokalemia  [ ] Hyperkalemia  [ ] Hypomagnesemia  [ ] Hypophosphatemia  [ ] Hyperphosphatemia  ------------------------------------------------------------------------  [ ] Acute blood loss anemia  [ ] Post op blood loss anemia  [ ] Iron deficiency anemia  [ ] Anemia due to chronic disease  [ ] Hypercoagulable state  [ ] Thrombocytopenia  ----------------------------------------------------------------------  [ ] Cerebral infarction  [ ] Transient ischemia attack  [ ] Encephalopathy - Toxic or Metabolic              A/P: 69yMale s/p bilateral femoral endarterectomy       Neuro:   - tylenol and percocet for pain control     Card:   -continue home norvasc and home Lipitor   Resp:   -no active issues   -home inhalers   GI:   -diet restarted   :   -passed TOV   Heme:   -Continue ASA   ID:  -ancef post-op     Vascular:   -Continue ASA   Dispo:   davonte d/c home today                 A/P:     Diet: Dash  Bedrest   Pain/nausea control  Asa 81 QD   3 doses cefazolin 2G q8  Home meds as appropriate Tylenol, Percocet   AM labs

## 2020-11-12 NOTE — DISCHARGE NOTE NURSING/CASE MANAGEMENT/SOCIAL WORK - NSDCPEWEB_GEN_ALL_CORE
[facility] website --- [email or web address] --- [email or web address]

## 2020-11-12 NOTE — PROGRESS NOTE ADULT - SUBJECTIVE AND OBJECTIVE BOX
INTERVAL HPI/OVERNIGHT EVENTS: ANGEL O/N    SUBJECTIVE: Patient seen and examined at bedside.     Pt seated in chair - endorsing some discomfort in bilateral groin controlled w pain medications. States claudication pain while walking is improved and he was able to walk longer in hallways today than preceding admission. Voiding wo issue. No chest pain, dyspnea, LE numbness.       OBJECTIVE:    VITAL SIGNS:  ICU Vital Signs Last 24 Hrs  T(C): 37.1 (12 Nov 2020 08:54), Max: 37.4 (11 Nov 2020 21:17)  T(F): 98.7 (12 Nov 2020 08:54), Max: 99.3 (11 Nov 2020 21:17)  HR: 85 (12 Nov 2020 08:35) (72 - 89)  BP: 139/68 (12 Nov 2020 08:35) (98/55 - 163/70)  BP(mean): 90 (11 Nov 2020 21:32) (73 - 97)  ABP: 144/55 (11 Nov 2020 20:38) (111/51 - 144/55)  ABP(mean): 86 (11 Nov 2020 20:38) (68 - 86)  RR: 18 (12 Nov 2020 08:35) (11 - 21)  SpO2: 98% (12 Nov 2020 08:35) (94% - 100%)        11-11 @ 07:01  -  11-12 @ 07:00  --------------------------------------------------------  IN: 3625 mL / OUT: 2065 mL / NET: 1560 mL    11-12 @ 07:01  -  11-12 @ 13:47  --------------------------------------------------------  IN: 180 mL / OUT: 0 mL / NET: 180 mL      CAPILLARY BLOOD GLUCOSE          PHYSICAL EXAM:    General: NAD  HEENT: NC/AT; PERRL, clear conjunctiva  Neck: supple  Respiratory: CTA b/l  Cardiovascular: +S1/S2; RRR  Abdomen: soft, NT/ND; +BS x4  Extremities: warm to palpationl; no LE edema  Skin: normal color and turgor; no rash  Neurological: A/O x3, moving all ext, conversant and appropriate    MEDICATIONS:  MEDICATIONS  (STANDING):  amLODIPine   Tablet 10 milliGRAM(s) Oral daily  aspirin  chewable 81 milliGRAM(s) Oral daily  atorvastatin 80 milliGRAM(s) Oral at bedtime  BUpivacaine liposome 1.3% Injectable (no eMAR) 20 milliLiter(s) Local Injection once  ceFAZolin   IVPB 2000 milliGRAM(s) IV Intermittent every 8 hours  pantoprazole    Tablet 40 milliGRAM(s) Oral before breakfast  tamsulosin 0.4 milliGRAM(s) Oral at bedtime    MEDICATIONS  (PRN):  acetaminophen   Tablet .. 650 milliGRAM(s) Oral every 6 hours PRN Moderate Pain (4 - 6)  ALBUTerol    90 MICROgram(s) HFA Inhaler 2 Puff(s) Inhalation every 8 hours PRN Shortness of Breath and/or Wheezing  oxycodone    5 mG/acetaminophen 325 mG 1 Tablet(s) Oral every 4 hours PRN Severe Pain (7 - 10)      ALLERGIES:  Allergies    penicillin (Hives)    Intolerances        LABS:                        8.4    10.60 )-----------( 195      ( 12 Nov 2020 09:32 )             26.5     11-12    143  |  108  |  16  ----------------------------<  106<H>  4.1   |  22  |  0.86    Ca    9.0      12 Nov 2020 09:32  Phos  3.1     11-12  Mg     2.0     11-12      PTT - ( 11 Nov 2020 16:28 )  PTT:30.5 sec      RADIOLOGY & ADDITIONAL TESTS: Reviewed.

## 2020-11-12 NOTE — DISCHARGE NOTE PROVIDER - NSDCMRMEDTOKEN_GEN_ALL_CORE_FT
acetaminophen 325 mg oral tablet: 2 tab(s) orally every 6 hours, As needed, Moderate Pain (4 - 6)  albuterol 90 mcg/inh inhalation aerosol: 2 puff(s) inhaled every 8 hours, As Needed  ALPRAZolam 0.5 mg oral tablet:   aspirin 81 mg oral tablet: 1  orally once a day  Crestor 20 mg oral tablet: orally once a day (at bedtime)  NexIUM 40 mg oral delayed release capsule: 1 cap(s) orally once a day  Norvasc 10 mg oral tablet: 1 tab(s) orally once a day  Percocet 5 mg-325 mg oral tablet: 1 tab(s) orally every 6 hours, As Needed -Severe Pain (7 - 10) MDD:4  tamsulosin 0.4 mg oral capsule: orally once a day (at bedtime)

## 2020-11-15 PROCEDURE — 36415 COLL VENOUS BLD VENIPUNCTURE: CPT

## 2020-11-15 PROCEDURE — 80048 BASIC METABOLIC PNL TOTAL CA: CPT

## 2020-11-15 PROCEDURE — 88311 DECALCIFY TISSUE: CPT

## 2020-11-15 PROCEDURE — P9016: CPT

## 2020-11-15 PROCEDURE — 86850 RBC ANTIBODY SCREEN: CPT

## 2020-11-15 PROCEDURE — 83735 ASSAY OF MAGNESIUM: CPT

## 2020-11-15 PROCEDURE — 86901 BLOOD TYPING SEROLOGIC RH(D): CPT

## 2020-11-15 PROCEDURE — 88304 TISSUE EXAM BY PATHOLOGIST: CPT

## 2020-11-15 PROCEDURE — 85027 COMPLETE CBC AUTOMATED: CPT

## 2020-11-15 PROCEDURE — 85730 THROMBOPLASTIN TIME PARTIAL: CPT

## 2020-11-15 PROCEDURE — C1889: CPT

## 2020-11-15 PROCEDURE — P9045: CPT

## 2020-11-15 PROCEDURE — 86900 BLOOD TYPING SEROLOGIC ABO: CPT

## 2020-11-15 PROCEDURE — 85025 COMPLETE CBC W/AUTO DIFF WBC: CPT

## 2020-11-15 PROCEDURE — 86923 COMPATIBILITY TEST ELECTRIC: CPT

## 2020-11-15 PROCEDURE — 84100 ASSAY OF PHOSPHORUS: CPT

## 2020-11-15 PROCEDURE — 36430 TRANSFUSION BLD/BLD COMPNT: CPT

## 2020-11-17 ENCOUNTER — APPOINTMENT (OUTPATIENT)
Dept: VASCULAR SURGERY | Facility: CLINIC | Age: 70
End: 2020-11-17
Payer: MEDICARE

## 2020-11-17 PROCEDURE — 99024 POSTOP FOLLOW-UP VISIT: CPT

## 2020-11-17 PROCEDURE — 99072 ADDL SUPL MATRL&STAF TM PHE: CPT

## 2020-11-17 PROCEDURE — 93926 LOWER EXTREMITY STUDY: CPT

## 2020-11-17 NOTE — REASON FOR VISIT
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  Currently, pt reports burning pain radiating down the medial thighs, but reports significantly improved claudication in the calves.  He has been constipated since surgery, and notes some scrotal edema, but all symptoms have been steadily improving.  Pt is eager to restart PT.

## 2020-11-17 NOTE — DISCUSSION/SUMMARY
[FreeTextEntry1] : 69yoM recently s/p EVAR/AAA w/persistent b/l LE claudication after surgery, now s/p b/l FEA w/patch angioplasty, returning for POC.  Currently, pt reports burning pain radiating down the medial thighs, but reports significantly improved claudication in the calves.  He has been constipated since surgery, and notes some scrotal edema, but all symptoms have been steadily improving.  Pt is eager to restart PT.\par \par Duplex today demonstrates widely patent b/l FEA w/patch angioplasty, brisk distal runoff.  Incisions healing well w/o no evidence of dehiscence/hernia.  Recommend pt restart PT and regular exercise.  He will return to the office in 6mos for surveillance of his EVAR and distal revasc.

## 2020-11-17 NOTE — PHYSICAL EXAM
[Normal Thyroid] : the thyroid was normal [Normal Breath Sounds] : Normal breath sounds [Normal Rate and Rhythm] : normal rate and rhythm [1+] : right 1+ [2+] : left 2+ [Ankle Swelling (On Exam)] : present [Ankle Swelling Bilaterally] : bilaterally  [Ankle Swelling On The Right] : mild [No Rash or Lesion] : No rash or lesion [Alert] : alert [Calm] : calm [JVD] : no jugular venous distention  [Varicose Veins Of Lower Extremities] : not present [] : not present [de-identified] : Well-nourished, NAD [de-identified] : NC/AT, anicteric [de-identified] : SNTND [de-identified] : +mild scrotal edema, non-tender [de-identified] : FROM throughout, strength 5/5x4, no palpable cords [de-identified] : Well-healing b/l groin incisions

## 2020-11-19 ENCOUNTER — NON-APPOINTMENT (OUTPATIENT)
Age: 70
End: 2020-11-19

## 2020-11-19 ENCOUNTER — APPOINTMENT (OUTPATIENT)
Dept: HEART AND VASCULAR | Facility: CLINIC | Age: 70
End: 2020-11-19
Payer: MEDICARE

## 2020-11-19 VITALS
DIASTOLIC BLOOD PRESSURE: 70 MMHG | HEART RATE: 83 BPM | BODY MASS INDEX: 29.7 KG/M2 | HEIGHT: 68 IN | SYSTOLIC BLOOD PRESSURE: 130 MMHG | WEIGHT: 196 LBS

## 2020-11-19 PROCEDURE — 36415 COLL VENOUS BLD VENIPUNCTURE: CPT

## 2020-11-19 PROCEDURE — 93000 ELECTROCARDIOGRAM COMPLETE: CPT

## 2020-11-19 PROCEDURE — 99214 OFFICE O/P EST MOD 30 MIN: CPT

## 2020-11-19 RX ORDER — POLYETHYLENE GLYCOL-3350 AND ELECTROLYTES 236; 6.74; 5.86; 2.97; 22.74 G/274.31G; G/274.31G; G/274.31G; G/274.31G; G/274.31G
236 POWDER, FOR SOLUTION ORAL
Qty: 4000 | Refills: 0 | Status: DISCONTINUED | COMMUNITY
Start: 2020-08-23 | End: 2020-11-19

## 2020-11-19 RX ORDER — AMLODIPINE AND VALSARTAN 10; 320 MG/1; MG/1
10-320 TABLET, FILM COATED ORAL
Qty: 90 | Refills: 3 | Status: DISCONTINUED | COMMUNITY
Start: 2020-07-06 | End: 2020-11-19

## 2020-11-19 RX ORDER — NICOTINE POLACRILEX 2 MG/1
2 GUM, CHEWING ORAL
Qty: 3 | Refills: 3 | Status: DISCONTINUED | COMMUNITY
Start: 2020-07-06 | End: 2020-11-19

## 2020-11-20 ENCOUNTER — LABORATORY RESULT (OUTPATIENT)
Age: 70
End: 2020-11-20

## 2020-11-21 NOTE — REASON FOR VISIT
[Follow-Up - Clinic] : a clinic follow-up of [Coronary Artery Disease] : coronary artery disease [CABG Follow-up] : bypass graft [Hypertension] : hypertension [FreeTextEntry1] : cardiac murmur

## 2020-11-21 NOTE — DISCUSSION/SUMMARY
[Coronary Artery Disease] : coronary artery disease [Dietary Modification] : dietary modification [Smoking Cessation] : smoking cessation [Weight Reduction] : weight reduction [Hypertension] : hypertension [Stable] : stable [None] : none [Sodium Restriction] : sodium restriction [Patient] : the patient [FreeTextEntry1] : Cardiac murmur- Stable; no further intervention at this time (see last echo).\par Blood work drawn. Maintain a low caloric, low sodium, low cholesterol  diet. Dietary counseling given, diet and exercise discussed in detail.

## 2020-11-25 NOTE — ED ADULT NURSE NOTE - FALLEN IN THE PAST
Please follow up in 1 month or sooner if needed. Please call the office with any questions or concerns you may have in the future.       [facility]  [address] [address] [place] [id number]  New patients call: [phone]  Established patients call: [phone]  
no

## 2020-11-27 LAB — SURGICAL PATHOLOGY STUDY: SIGNIFICANT CHANGE UP

## 2020-11-30 LAB
ALBUMIN SERPL ELPH-MCNC: 4.1 G/DL
ALP BLD-CCNC: 73 U/L
ALT SERPL-CCNC: 11 U/L
ANION GAP SERPL CALC-SCNC: 12 MMOL/L
AST SERPL-CCNC: 12 U/L
BASOPHILS # BLD AUTO: 0.06 K/UL
BASOPHILS NFR BLD AUTO: 0.9 %
BILIRUB SERPL-MCNC: 0.5 MG/DL
BUN SERPL-MCNC: 16 MG/DL
CALCIUM SERPL-MCNC: 8.9 MG/DL
CHLORIDE SERPL-SCNC: 103 MMOL/L
CHOLEST SERPL-MCNC: 116 MG/DL
CO2 SERPL-SCNC: 24 MMOL/L
CREAT SERPL-MCNC: 1 MG/DL
EOSINOPHIL # BLD AUTO: 0.31 K/UL
EOSINOPHIL NFR BLD AUTO: 4.4 %
ESTIMATED AVERAGE GLUCOSE: 111 MG/DL
GLUCOSE SERPL-MCNC: 83 MG/DL
HBA1C MFR BLD HPLC: 5.5 %
HCT VFR BLD CALC: 26.2 %
HDLC SERPL-MCNC: 41 MG/DL
HGB BLD-MCNC: 7.6 G/DL
IMM GRANULOCYTES NFR BLD AUTO: 0.3 %
LDLC SERPL CALC-MCNC: 53 MG/DL
LYMPHOCYTES # BLD AUTO: 2.15 K/UL
LYMPHOCYTES NFR BLD AUTO: 30.6 %
MAN DIFF?: NORMAL
MCHC RBC-ENTMCNC: 24.8 PG
MCHC RBC-ENTMCNC: 29 GM/DL
MCV RBC AUTO: 85.6 FL
MONOCYTES # BLD AUTO: 0.57 K/UL
MONOCYTES NFR BLD AUTO: 8.1 %
NEUTROPHILS # BLD AUTO: 3.91 K/UL
NEUTROPHILS NFR BLD AUTO: 55.7 %
NONHDLC SERPL-MCNC: 75 MG/DL
PLATELET # BLD AUTO: 298 K/UL
POTASSIUM SERPL-SCNC: 4.1 MMOL/L
PROT SERPL-MCNC: 6.7 G/DL
RBC # BLD: 3.06 M/UL
RBC # FLD: 18.8 %
SODIUM SERPL-SCNC: 139 MMOL/L
T3 SERPL-MCNC: 119 NG/DL
T3FREE SERPL-MCNC: 3.04 PG/ML
T3RU NFR SERPL: 1 TBI
T4 FREE SERPL-MCNC: 1.3 NG/DL
T4 SERPL-MCNC: 7 UG/DL
TRIGL SERPL-MCNC: 109 MG/DL
TSH SERPL-ACNC: 1.23 UIU/ML
WBC # FLD AUTO: 7.02 K/UL

## 2020-12-03 ENCOUNTER — APPOINTMENT (OUTPATIENT)
Dept: HEART AND VASCULAR | Facility: CLINIC | Age: 70
End: 2020-12-03

## 2020-12-03 LAB
BASOPHILS # BLD AUTO: 0.06 K/UL
BASOPHILS NFR BLD AUTO: 1.1 %
EOSINOPHIL # BLD AUTO: 0.5 K/UL
EOSINOPHIL NFR BLD AUTO: 9 %
HCT VFR BLD CALC: 29.9 %
HGB BLD-MCNC: 8.4 G/DL
IMM GRANULOCYTES NFR BLD AUTO: 0.2 %
LYMPHOCYTES # BLD AUTO: 1.87 K/UL
LYMPHOCYTES NFR BLD AUTO: 33.6 %
MAN DIFF?: NORMAL
MCHC RBC-ENTMCNC: 23.9 PG
MCHC RBC-ENTMCNC: 28.1 GM/DL
MCV RBC AUTO: 84.9 FL
MONOCYTES # BLD AUTO: 0.48 K/UL
MONOCYTES NFR BLD AUTO: 8.6 %
NEUTROPHILS # BLD AUTO: 2.64 K/UL
NEUTROPHILS NFR BLD AUTO: 47.5 %
PLATELET # BLD AUTO: 305 K/UL
RBC # BLD: 3.52 M/UL
RBC # FLD: 18.6 %
WBC # FLD AUTO: 5.56 K/UL

## 2020-12-23 ENCOUNTER — NON-APPOINTMENT (OUTPATIENT)
Age: 70
End: 2020-12-23

## 2020-12-23 PROBLEM — Z01.810 ENCOUNTER FOR PRE-OPERATIVE CARDIOVASCULAR CLEARANCE: Status: RESOLVED | Noted: 2020-03-06 | Resolved: 2020-12-23

## 2021-02-08 ENCOUNTER — LABORATORY RESULT (OUTPATIENT)
Age: 71
End: 2021-02-08

## 2021-02-08 ENCOUNTER — APPOINTMENT (OUTPATIENT)
Dept: HEART AND VASCULAR | Facility: CLINIC | Age: 71
End: 2021-02-08
Payer: MEDICARE

## 2021-02-08 ENCOUNTER — NON-APPOINTMENT (OUTPATIENT)
Age: 71
End: 2021-02-08

## 2021-02-08 VITALS
DIASTOLIC BLOOD PRESSURE: 60 MMHG | HEIGHT: 68 IN | WEIGHT: 186 LBS | HEART RATE: 83 BPM | SYSTOLIC BLOOD PRESSURE: 125 MMHG | BODY MASS INDEX: 28.19 KG/M2 | RESPIRATION RATE: 14 BRPM

## 2021-02-08 DIAGNOSIS — M46.26 OSTEOMYELITIS OF VERTEBRA, LUMBAR REGION: ICD-10-CM

## 2021-02-08 DIAGNOSIS — Z87.448 PERSONAL HISTORY OF OTHER DISEASES OF URINARY SYSTEM: ICD-10-CM

## 2021-02-08 DIAGNOSIS — Z87.438 PERSONAL HISTORY OF OTHER DISEASES OF MALE GENITAL ORGANS: ICD-10-CM

## 2021-02-08 PROCEDURE — 93000 ELECTROCARDIOGRAM COMPLETE: CPT

## 2021-02-08 PROCEDURE — 36415 COLL VENOUS BLD VENIPUNCTURE: CPT

## 2021-02-08 PROCEDURE — 99214 OFFICE O/P EST MOD 30 MIN: CPT

## 2021-02-08 PROCEDURE — 99072 ADDL SUPL MATRL&STAF TM PHE: CPT

## 2021-02-08 RX ORDER — PNEUMOCOCCAL 23-VAL P-SAC VAC 25MCG/0.5
25 VIAL (ML) INJECTION
Qty: 1 | Refills: 0 | Status: COMPLETED | COMMUNITY
Start: 2020-09-21 | End: 2020-09-21

## 2021-02-08 RX ORDER — OXYCODONE AND ACETAMINOPHEN 5; 325 MG/1; MG/1
5-325 TABLET ORAL
Qty: 28 | Refills: 0 | Status: DISCONTINUED | COMMUNITY
Start: 2019-01-09 | End: 2021-02-08

## 2021-02-08 RX ORDER — CYCLOBENZAPRINE HYDROCHLORIDE 5 MG/1
5 TABLET, FILM COATED ORAL
Qty: 30 | Refills: 0 | Status: DISCONTINUED | COMMUNITY
Start: 2020-07-06 | End: 2021-02-08

## 2021-02-14 NOTE — PHYSICAL EXAM
[General Appearance - Well Developed] : well developed [Normal Appearance] : normal appearance [Well Groomed] : well groomed [General Appearance - Well Nourished] : well nourished [No Deformities] : no deformities [General Appearance - In No Acute Distress] : no acute distress [Normal Conjunctiva] : the conjunctiva exhibited no abnormalities [Eyelids - No Xanthelasma] : the eyelids demonstrated no xanthelasmas [Normal Oral Mucosa] : normal oral mucosa [No Oral Pallor] : no oral pallor [No Oral Cyanosis] : no oral cyanosis [Normal Jugular Venous A Waves Present] : normal jugular venous A waves present [Normal Jugular Venous V Waves Present] : normal jugular venous V waves present [No Jugular Venous Valenzuela A Waves] : no jugular venous valenzuela A waves [Exaggerated Use Of Accessory Muscles For Inspiration] : no accessory muscle use [Respiration, Rhythm And Depth] : normal respiratory rhythm and effort [Auscultation Breath Sounds / Voice Sounds] : lungs were clear to auscultation bilaterally [Heart Rate And Rhythm] : heart rate and rhythm were normal [Heart Sounds] : normal S1 and S2 [Systolic grade ___/6] : A grade [unfilled]/6 systolic murmur was heard. [Abdomen Soft] : soft [Abdomen Tenderness] : non-tender [Abdomen Mass (___ Cm)] : no abdominal mass palpated [Abnormal Walk] : normal gait [Nail Clubbing] : no clubbing of the fingernails [Cyanosis, Localized] : no localized cyanosis [Petechial Hemorrhages (___cm)] : no petechial hemorrhages [Skin Color & Pigmentation] : normal skin color and pigmentation [] : no rash [No Venous Stasis] : no venous stasis [Skin Lesions] : no skin lesions [No Skin Ulcers] : no skin ulcer [No Xanthoma] : no  xanthoma was observed [Oriented To Time, Place, And Person] : oriented to person, place, and time [Affect] : the affect was normal [Mood] : the mood was normal [No Anxiety] : not feeling anxious

## 2021-02-14 NOTE — REASON FOR VISIT
[Follow-Up - Clinic] : a clinic follow-up of [Coronary Artery Disease] : coronary artery disease [CABG Follow-up] : bypass graft [Hypertension] : hypertension [FreeTextEntry1] : anemia, prediabetes,

## 2021-02-14 NOTE — DISCUSSION/SUMMARY
[Coronary Artery Disease] : coronary artery disease [Dietary Modification] : dietary modification [Smoking Cessation] : smoking cessation [Weight Reduction] : weight reduction [Hypertension] : hypertension [Stable] : stable [None] : none [Patient] : the patient [FreeTextEntry1] : Anemia & Prediabetes- Blood work drawn. Maintain a low caloric, low sodium, low cholesterol  diet. Dietary counseling given, diet and exercise discussed in detail. Smoking cessation also reviewed and strategies discussed.

## 2021-02-18 LAB
ALBUMIN SERPL ELPH-MCNC: 4.4 G/DL
ALP BLD-CCNC: 106 U/L
ALT SERPL-CCNC: 10 U/L
ANION GAP SERPL CALC-SCNC: 19 MMOL/L
AST SERPL-CCNC: 13 U/L
BASOPHILS # BLD AUTO: 0.05 K/UL
BASOPHILS NFR BLD AUTO: 0.8 %
BILIRUB SERPL-MCNC: 0.3 MG/DL
BUN SERPL-MCNC: 19 MG/DL
CALCIUM SERPL-MCNC: 9.5 MG/DL
CHLORIDE SERPL-SCNC: 108 MMOL/L
CHOLEST SERPL-MCNC: 135 MG/DL
CO2 SERPL-SCNC: 18 MMOL/L
CREAT SERPL-MCNC: 0.96 MG/DL
EOSINOPHIL # BLD AUTO: 0.19 K/UL
EOSINOPHIL NFR BLD AUTO: 3.1 %
ESTIMATED AVERAGE GLUCOSE: 94 MG/DL
GLUCOSE SERPL-MCNC: 107 MG/DL
HBA1C MFR BLD HPLC: 4.9 %
HCT VFR BLD CALC: 36.2 %
HDLC SERPL-MCNC: 47 MG/DL
HGB BLD-MCNC: 11 G/DL
IMM GRANULOCYTES NFR BLD AUTO: 0.2 %
LDLC SERPL CALC-MCNC: 64 MG/DL
LYMPHOCYTES # BLD AUTO: 1.94 K/UL
LYMPHOCYTES NFR BLD AUTO: 31.6 %
MAN DIFF?: NORMAL
MCHC RBC-ENTMCNC: 26.8 PG
MCHC RBC-ENTMCNC: 30.4 GM/DL
MCV RBC AUTO: 88.3 FL
MONOCYTES # BLD AUTO: 0.51 K/UL
MONOCYTES NFR BLD AUTO: 8.3 %
NEUTROPHILS # BLD AUTO: 3.43 K/UL
NEUTROPHILS NFR BLD AUTO: 56 %
NONHDLC SERPL-MCNC: 88 MG/DL
PLATELET # BLD AUTO: 161 K/UL
POTASSIUM SERPL-SCNC: 4.8 MMOL/L
PROT SERPL-MCNC: 6.8 G/DL
PSA FREE FLD-MCNC: 18 %
PSA FREE SERPL-MCNC: 0.05 NG/ML
PSA SERPL-MCNC: 0.26 NG/ML
RBC # BLD: 4.1 M/UL
RBC # FLD: NORMAL
SODIUM SERPL-SCNC: 144 MMOL/L
T3 SERPL-MCNC: 95 NG/DL
T3FREE SERPL-MCNC: 2.6 PG/ML
T3RU NFR SERPL: 1.1 TBI
T4 FREE SERPL-MCNC: 1.1 NG/DL
T4 SERPL-MCNC: 6.2 UG/DL
TRIGL SERPL-MCNC: 117 MG/DL
TSH SERPL-ACNC: 1.22 UIU/ML
WBC # FLD AUTO: 6.13 K/UL

## 2021-03-04 ENCOUNTER — RX RENEWAL (OUTPATIENT)
Age: 71
End: 2021-03-04

## 2021-03-16 ENCOUNTER — NON-APPOINTMENT (OUTPATIENT)
Age: 71
End: 2021-03-16

## 2021-03-16 ENCOUNTER — APPOINTMENT (OUTPATIENT)
Dept: HEART AND VASCULAR | Facility: CLINIC | Age: 71
End: 2021-03-16
Payer: MEDICARE

## 2021-03-16 VITALS
DIASTOLIC BLOOD PRESSURE: 70 MMHG | HEIGHT: 68 IN | RESPIRATION RATE: 14 BRPM | SYSTOLIC BLOOD PRESSURE: 130 MMHG | WEIGHT: 190 LBS | BODY MASS INDEX: 28.79 KG/M2 | HEART RATE: 77 BPM

## 2021-03-16 PROCEDURE — 93000 ELECTROCARDIOGRAM COMPLETE: CPT

## 2021-03-16 PROCEDURE — 99214 OFFICE O/P EST MOD 30 MIN: CPT

## 2021-03-16 PROCEDURE — 99072 ADDL SUPL MATRL&STAF TM PHE: CPT

## 2021-03-19 NOTE — REASON FOR VISIT
[Follow-Up - Clinic] : a clinic follow-up of [Coronary Artery Disease] : coronary artery disease [CABG Follow-up] : bypass graft [Hypertension] : hypertension [FreeTextEntry1] : anemia

## 2021-03-19 NOTE — DISCUSSION/SUMMARY
[Coronary Artery Disease] : coronary artery disease [Dietary Modification] : dietary modification [Smoking Cessation] : smoking cessation [Exercise Regimen] : an exercise regimen [Weight Reduction] : weight reduction [Hypertension] : hypertension [Stable] : stable [None] : none [Sodium Restriction] : sodium restriction [Patient] : the patient [FreeTextEntry1] : Anemia- Blood work reviewed with pt. Anemia much improved.  Maintain a low caloric, low sodium, low cholesterol  diet. Dietary counseling given, diet and exercise discussed in detail, weight loss recommended.\par

## 2021-03-23 ENCOUNTER — APPOINTMENT (OUTPATIENT)
Dept: VASCULAR SURGERY | Facility: CLINIC | Age: 71
End: 2021-03-23
Payer: MEDICARE

## 2021-03-23 PROCEDURE — 99213 OFFICE O/P EST LOW 20 MIN: CPT

## 2021-03-23 PROCEDURE — 93926 LOWER EXTREMITY STUDY: CPT

## 2021-03-23 PROCEDURE — 99072 ADDL SUPL MATRL&STAF TM PHE: CPT

## 2021-04-01 ENCOUNTER — NON-APPOINTMENT (OUTPATIENT)
Age: 71
End: 2021-04-01

## 2021-04-01 ENCOUNTER — APPOINTMENT (OUTPATIENT)
Dept: HEART AND VASCULAR | Facility: CLINIC | Age: 71
End: 2021-04-01
Payer: MEDICARE

## 2021-04-01 VITALS
BODY MASS INDEX: 28.19 KG/M2 | DIASTOLIC BLOOD PRESSURE: 80 MMHG | RESPIRATION RATE: 13 BRPM | HEART RATE: 80 BPM | HEIGHT: 68 IN | WEIGHT: 186 LBS | SYSTOLIC BLOOD PRESSURE: 120 MMHG

## 2021-04-01 DIAGNOSIS — F17.200 NICOTINE DEPENDENCE, UNSPECIFIED, UNCOMPLICATED: ICD-10-CM

## 2021-04-01 PROCEDURE — 99072 ADDL SUPL MATRL&STAF TM PHE: CPT

## 2021-04-01 PROCEDURE — 99214 OFFICE O/P EST MOD 30 MIN: CPT

## 2021-04-01 PROCEDURE — 93000 ELECTROCARDIOGRAM COMPLETE: CPT

## 2021-04-04 PROBLEM — F17.200 SMOKING ADDICTION: Status: ACTIVE | Noted: 2019-09-11

## 2021-04-04 NOTE — DISCUSSION/SUMMARY
[Coronary Artery Disease] : coronary artery disease [Dietary Modification] : dietary modification [Hypertension] : hypertension [Stable] : stable [None] : none [Sodium Restriction] : sodium restriction [Patient] : the patient [FreeTextEntry1] : Prediabetes- blood work reviewed with patient. Maintain a low caloric, low sodium, low cholesterol  diet. Dietary counseling given, diet and exercise discussed in detail. Smoking cessation strategies reviewed, pt. acknowledged understanding and need to eliminate smoking.\par

## 2021-04-04 NOTE — REASON FOR VISIT
[Follow-Up - Clinic] : a clinic follow-up of [Coronary Artery Disease] : coronary artery disease [CABG Follow-up] : bypass graft [Hypertension] : hypertension [FreeTextEntry1] : prediabetes and smoking addiction

## 2021-04-04 NOTE — PHYSICAL EXAM
[General Appearance - Well Developed] : well developed [Normal Appearance] : normal appearance [General Appearance - Well Nourished] : well nourished [Well Groomed] : well groomed [No Deformities] : no deformities [General Appearance - In No Acute Distress] : no acute distress [Normal Conjunctiva] : the conjunctiva exhibited no abnormalities [Eyelids - No Xanthelasma] : the eyelids demonstrated no xanthelasmas [Normal Oral Mucosa] : normal oral mucosa [No Oral Pallor] : no oral pallor [No Oral Cyanosis] : no oral cyanosis [Normal Jugular Venous A Waves Present] : normal jugular venous A waves present [Normal Jugular Venous V Waves Present] : normal jugular venous V waves present [No Jugular Venous Valenzuela A Waves] : no jugular venous valenzuela A waves [Respiration, Rhythm And Depth] : normal respiratory rhythm and effort [Exaggerated Use Of Accessory Muscles For Inspiration] : no accessory muscle use [Auscultation Breath Sounds / Voice Sounds] : lungs were clear to auscultation bilaterally [Heart Sounds] : normal S1 and S2 [Heart Rate And Rhythm] : heart rate and rhythm were normal [Systolic grade ___/6] : A grade [unfilled]/6 systolic murmur was heard. [Abdomen Soft] : soft [Abdomen Tenderness] : non-tender [Abdomen Mass (___ Cm)] : no abdominal mass palpated [Abnormal Walk] : normal gait [Nail Clubbing] : no clubbing of the fingernails [Cyanosis, Localized] : no localized cyanosis [Petechial Hemorrhages (___cm)] : no petechial hemorrhages [Skin Color & Pigmentation] : normal skin color and pigmentation [] : no rash [No Venous Stasis] : no venous stasis [Skin Lesions] : no skin lesions [No Skin Ulcers] : no skin ulcer [No Xanthoma] : no  xanthoma was observed [Oriented To Time, Place, And Person] : oriented to person, place, and time [Affect] : the affect was normal [Mood] : the mood was normal [No Anxiety] : not feeling anxious

## 2021-05-07 NOTE — HISTORY OF PRESENT ILLNESS
[FreeTextEntry1] : 70yoM s/p EVAR/AAA and s/p bl FEA in November 2020 for persistent b/l LE claudication, returns for surveillance of his AAA and runoff.  Currently, pt reports slightly improved LE claudication, but still admits to fatigue in the thighs and pain at the medial thighs b/l, which improves when he adjusts his posture and wears a posture brace.  Pt recently saw his spine surgeon who states that there are no active spine/neuro issues at this time.

## 2021-05-07 NOTE — PHYSICAL EXAM
[Normal Thyroid] : the thyroid was normal [Normal Breath Sounds] : Normal breath sounds [Normal Rate and Rhythm] : normal rate and rhythm [1+] : right 1+ [2+] : left 2+ [Ankle Swelling (On Exam)] : present [Ankle Swelling Bilaterally] : bilaterally  [Ankle Swelling On The Right] : mild [No Rash or Lesion] : No rash or lesion [Alert] : alert [Calm] : calm [JVD] : no jugular venous distention  [Varicose Veins Of Lower Extremities] : not present [] : not present [de-identified] : Well-nourished, NAD [de-identified] : NC/AT, anicteric [de-identified] : SNTND [de-identified] : +mild scrotal edema, non-tender [de-identified] : FROM throughout, strength 5/5x4, no palpable cords [de-identified] : Well-healing b/l groin incisions

## 2021-05-07 NOTE — ASSESSMENT
[FreeTextEntry1] : 70yoM s/p EVAR/AAA and s/p bl FEA in November 2020 for persistent b/l LE claudication, returns for surveillance of his AAA and runoff.  Currently, pt reports slightly improved LE claudication, but still admits to fatigue in the thighs and pain at the medial thighs b/l, which improves when he adjusts his posture and wears a posture brace.  Pt recently saw his spine surgeon who states that there are no active spine/neuro issues at this time.\par \par Duplex today demonstrates widely patent b/l FEA w/patch angioplasty, brisk distal runoff.  Recommend pt continue regular exercise, but reassured him that his pelvic circulation is widely patent and no vascular intervention is required at this time..  He will return to the office in 6mos for surveillance of his EVAR and distal revasc.\par \par \par I, Dr. Hdez, personally performed the evaluation and management (E/M) services for this established patient who presents today with (a) new problem(s)/exacerbation of (an) existing condition(s).  That E/M includes conducting the examination, assessing all new/exacerbated conditions, and establishing a new plan of care.  Today, ACP, Josep Olmstead, was here to observe my evaluation and management services for this new problem/exacerbated condition to be followed going forward.

## 2021-05-07 NOTE — PROCEDURE
[FreeTextEntry1] : Duplex today demonstrates widely patent b/l FEA w/patch angioplasty, brisk distal runoff.  Incisions healing well w/o no evidence of dehiscence/hernia.  Recommend pt restart PT and regular exercise.  He will return to the office in 6mos for surveillance of his EVAR and distal revasc.

## 2021-05-07 NOTE — ADDENDUM
[FreeTextEntry1] : At the time of this visit, arterial duplex of the abdominal aorta and LEs was required to further evaluate this pt's circulation to determine of his LE pain.

## 2021-06-06 ENCOUNTER — RX RENEWAL (OUTPATIENT)
Age: 71
End: 2021-06-06

## 2021-06-08 NOTE — PATIENT PROFILE ADULT. - TEACHING/LEARNING LEARNING PREFERENCES
71F with history of dementia HTN,  shunt for NPH, seizures, recurrent falls and was recently seen in [facility] 6/3/2021 for left subacute SDH s/p fall, now transferred from [facility] ED s/p fall with worsened L SDH without midline shift and facial fractures. VPS adjusted at [facility] from 5 to 7    PLAN:  - rpt [facility] today. If stable, can resume DVT ppx tmrw.   - c/w keppra as per NSX  - F/u Plastics for facial fracture management --> soft, diet, outpatient f/u   - Hold AC/Antiplatelet agents  - PT eval--> recc return to assisted living facility  - c/w home meds      ACS [id number] verbal instruction/written material/individual instruction

## 2021-08-02 ENCOUNTER — RX RENEWAL (OUTPATIENT)
Age: 71
End: 2021-08-02

## 2021-08-04 ENCOUNTER — APPOINTMENT (OUTPATIENT)
Dept: HEART AND VASCULAR | Facility: CLINIC | Age: 71
End: 2021-08-04
Payer: MEDICARE

## 2021-08-04 ENCOUNTER — LABORATORY RESULT (OUTPATIENT)
Age: 71
End: 2021-08-04

## 2021-08-04 PROCEDURE — 36415 COLL VENOUS BLD VENIPUNCTURE: CPT

## 2021-08-05 LAB
ALBUMIN SERPL ELPH-MCNC: 4.4 G/DL
ALP BLD-CCNC: 76 U/L
ALT SERPL-CCNC: 14 U/L
ANION GAP SERPL CALC-SCNC: 12 MMOL/L
AST SERPL-CCNC: 12 U/L
BASOPHILS # BLD AUTO: 0.05 K/UL
BASOPHILS NFR BLD AUTO: 0.9 %
BILIRUB SERPL-MCNC: 0.9 MG/DL
BUN SERPL-MCNC: 17 MG/DL
CALCIUM SERPL-MCNC: 9.7 MG/DL
CHLORIDE SERPL-SCNC: 104 MMOL/L
CHOLEST SERPL-MCNC: 149 MG/DL
CO2 SERPL-SCNC: 25 MMOL/L
CREAT SERPL-MCNC: 1.15 MG/DL
EOSINOPHIL # BLD AUTO: 0.1 K/UL
EOSINOPHIL NFR BLD AUTO: 1.7 %
ESTIMATED AVERAGE GLUCOSE: 108 MG/DL
GLUCOSE SERPL-MCNC: 98 MG/DL
HBA1C MFR BLD HPLC: 5.4 %
HCT VFR BLD CALC: 47.6 %
HDLC SERPL-MCNC: 45 MG/DL
HGB BLD-MCNC: 14.8 G/DL
IMM GRANULOCYTES NFR BLD AUTO: 0.2 %
LDLC SERPL CALC-MCNC: 85 MG/DL
LYMPHOCYTES # BLD AUTO: 2.16 K/UL
LYMPHOCYTES NFR BLD AUTO: 37.3 %
MAN DIFF?: NORMAL
MCHC RBC-ENTMCNC: 31.1 GM/DL
MCHC RBC-ENTMCNC: 31.9 PG
MCV RBC AUTO: 102.6 FL
MONOCYTES # BLD AUTO: 0.52 K/UL
MONOCYTES NFR BLD AUTO: 9 %
NEUTROPHILS # BLD AUTO: 2.95 K/UL
NEUTROPHILS NFR BLD AUTO: 50.9 %
NONHDLC SERPL-MCNC: 104 MG/DL
PLATELET # BLD AUTO: 175 K/UL
POTASSIUM SERPL-SCNC: 5.1 MMOL/L
PROT SERPL-MCNC: 6.6 G/DL
RBC # BLD: 4.64 M/UL
RBC # FLD: 16.5 %
SODIUM SERPL-SCNC: 141 MMOL/L
T3 SERPL-MCNC: 109 NG/DL
T3FREE SERPL-MCNC: 3.41 PG/ML
T4 FREE SERPL-MCNC: 1.4 NG/DL
T4 SERPL-MCNC: 7.5 UG/DL
TRIGL SERPL-MCNC: 99 MG/DL
TSH SERPL-ACNC: 1.74 UIU/ML
WBC # FLD AUTO: 5.79 K/UL

## 2021-08-09 ENCOUNTER — APPOINTMENT (OUTPATIENT)
Dept: HEART AND VASCULAR | Facility: CLINIC | Age: 71
End: 2021-08-09
Payer: MEDICARE

## 2021-08-09 ENCOUNTER — NON-APPOINTMENT (OUTPATIENT)
Age: 71
End: 2021-08-09

## 2021-08-09 VITALS
HEIGHT: 68 IN | WEIGHT: 186 LBS | BODY MASS INDEX: 28.19 KG/M2 | SYSTOLIC BLOOD PRESSURE: 140 MMHG | HEART RATE: 80 BPM | RESPIRATION RATE: 14 BRPM | DIASTOLIC BLOOD PRESSURE: 90 MMHG

## 2021-08-09 DIAGNOSIS — Z86.2 PERSONAL HISTORY OF DISEASES OF THE BLOOD AND BLOOD-FORMING ORGANS AND CERTAIN DISORDERS INVOLVING THE IMMUNE MECHANISM: ICD-10-CM

## 2021-08-09 PROCEDURE — 93880 EXTRACRANIAL BILAT STUDY: CPT

## 2021-08-09 PROCEDURE — ZZZZZ: CPT

## 2021-08-09 PROCEDURE — 93306 TTE W/DOPPLER COMPLETE: CPT

## 2021-08-09 PROCEDURE — 93000 ELECTROCARDIOGRAM COMPLETE: CPT

## 2021-08-09 PROCEDURE — 99214 OFFICE O/P EST MOD 30 MIN: CPT

## 2021-08-09 NOTE — DISCUSSION/SUMMARY
[Coronary Artery Disease] : coronary artery disease [Dietary Modification] : dietary modification [Smoking Cessation] : smoking cessation [Family] : discussed with the patient's family [Hypertension] : hypertension [Stable] : stable [None] : There are no changes in medication management [Low Sodium Diet] : low sodium diet [NSAIDs Avoidance] : non-steroidal anti-inflammatory drugs avoidance [Patient] : the patient [FreeTextEntry1] : Carotid artery disease- Stable; no further intervention at this time.\par Prediabetes- blood work reviewed with patient. Maintain a low caloric, low sodium, low cholesterol  diet. Dietary counseling given, diet and exercise discussed in detail.

## 2021-08-09 NOTE — COUNSELING
[Risk of tobacco use and health benefits of smoking cessation discussed] : Risk of tobacco use and health benefits of smoking cessation discussed [Cessation strategies including cessation program discussed] : Cessation strategies including cessation program discussed [Use of nicotine replacement therapies and other medications discussed] : Use of nicotine replacement therapies and other medications discussed [Tobacco Use Cessation Intermediate Greater Than 3 Minutes Up to 10 Minutes] : Tobacco Use Cessation Intermediate Greater Than 3 Minutes Up to 10 Minutes [FreeTextEntry1] : 5

## 2021-08-09 NOTE — REASON FOR VISIT
[Hypertension] : hypertension [Coronary Artery Disease] : coronary artery disease [Other: ____] : [unfilled] [Symptom and Test Evaluation] : symptom and test evaluation

## 2021-08-09 NOTE — HISTORY OF PRESENT ILLNESS
[FreeTextEntry1] : Denies Chest Pain, SOB, Dizziness, Leg edema, Orthopnea, PND, Palpitations, Syncope, KRISHNA, Diaphoresis.\par CAD/HTN- Echo ordered to assess LV function/possible valvular heart disease.\par Patient denies change in appetite, fatigue, heat or cold intolerance, myalgias, polydypsia, polyphagia, polyuria, tingling, numbness.\par

## 2021-08-09 NOTE — PHYSICAL EXAM
[Well Developed] : well developed [Well Nourished] : well nourished [No Acute Distress] : no acute distress [Normal Conjunctiva] : normal conjunctiva [Normal Venous Pressure] : normal venous pressure [Normal S1, S2] : normal S1, S2 [No Rub] : no rub [No Gallop] : no gallop [Murmur] : murmur [Clear Lung Fields] : clear lung fields [Good Air Entry] : good air entry [No Respiratory Distress] : no respiratory distress  [Soft] : abdomen soft [Non Tender] : non-tender [No Masses/organomegaly] : no masses/organomegaly [Normal Bowel Sounds] : normal bowel sounds [Normal Gait] : normal gait [No Edema] : no edema [No Cyanosis] : no cyanosis [No Clubbing] : no clubbing [No Varicosities] : no varicosities [No Rash] : no rash [No Skin Lesions] : no skin lesions [Moves all extremities] : moves all extremities [No Focal Deficits] : no focal deficits [Normal Speech] : normal speech [Alert and Oriented] : alert and oriented [Normal memory] : normal memory [de-identified] : 2/6 JANINE-> Axilla

## 2021-08-26 ENCOUNTER — RX RENEWAL (OUTPATIENT)
Age: 71
End: 2021-08-26

## 2021-10-12 ENCOUNTER — APPOINTMENT (OUTPATIENT)
Dept: HEART AND VASCULAR | Facility: CLINIC | Age: 71
End: 2021-10-12
Payer: MEDICARE

## 2021-10-12 ENCOUNTER — NON-APPOINTMENT (OUTPATIENT)
Age: 71
End: 2021-10-12

## 2021-10-12 PROCEDURE — G0439: CPT

## 2021-10-12 PROCEDURE — G0008: CPT

## 2021-10-12 PROCEDURE — 93000 ELECTROCARDIOGRAM COMPLETE: CPT

## 2021-10-12 PROCEDURE — 90686 IIV4 VACC NO PRSV 0.5 ML IM: CPT

## 2021-10-12 PROCEDURE — G0296 VISIT TO DETERM LDCT ELIG: CPT

## 2021-10-12 PROCEDURE — 99406 BEHAV CHNG SMOKING 3-10 MIN: CPT

## 2021-10-12 NOTE — HEALTH RISK ASSESSMENT
[Good] : ~his/her~  mood as  good [] : Yes [15-19] : 15-19 [Yes] : Yes [1 or 2 (0 pts)] : 1 or 2 (0 points) [Never (0 pts)] : Never (0 points) [No] : In the past 12 months have you used drugs other than those required for medical reasons? No [One fall no injury in past year] : Patient reported one fall in the past year without injury [0] : 2) Feeling down, depressed, or hopeless: Not at all (0) [LowDoseCTScan] : 1/24/21

## 2021-10-12 NOTE — COUNSELING
[Fall prevention counseling provided] : Fall prevention counseling provided [Adequate lighting] : Adequate lighting [No throw rugs] : No throw rugs [Use proper foot wear] : Use proper foot wear [Behavioral health counseling provided] : Behavioral health counseling provided [Sleep ___ hours/day] : Sleep [unfilled] hours/day [Engage in a relaxing activity] : Engage in a relaxing activity [Risk of tobacco use and health benefits of smoking cessation discussed] : Risk of tobacco use and health benefits of smoking cessation discussed [Cessation strategies including cessation program discussed] : Cessation strategies including cessation program discussed [Use of nicotine replacement therapies and other medications discussed] : Use of nicotine replacement therapies and other medications discussed [Willing to Quit Smoking] : Willing to quit smoking [Tobacco Use Cessation Intermediate Greater Than 3 Minutes Up to 10 Minutes] : Tobacco Use Cessation Intermediate Greater Than 3 Minutes Up to 10 Minutes [FreeTextEntry1] : 10 [Benefits of weight loss discussed] : Benefits of weight loss discussed [Encouraged to increase physical activity] : Encouraged to increase physical activity [Encouraged to use exercise tracking device] : Encouraged to use exercise tracking device [ - Annual Lung Cancer Screening/Share Decision Making Discussion] : Annual Lung Cancer Screening/Share Decision Making Discussion. (I have advised this patient to have a Low Dose CT (LDCT) scan of the lungs and have discussed the following with the patient in a shared decision making discussion:   Benefits of Detection and Early Treatment: There is adequate evidence that annual screening for lung cancer with LDCT in a population of high-risk persons can prevent a substantial number of lung cancer–related deaths. The magnitude of benefit depends on the individual patient's risk for lung cancer, as those who are at highest risk are most likely to benefit. Screening cannot prevent most lung cancer–related deaths, and does not replace smoking cessation. Harms of Detection and Early Intervention and Treatment: The harms associated with LDCT screening include false-negative and false-positive results, incidental findings, over diagnosis, and radiation exposure. False-positive LDCT results occur in a substantial proportion of screened persons; 95% of all positive results do not lead to a diagnosis of cancer. In a high-quality screening program, further imaging can resolve most false-positive results; however, some patients may require invasive procedures. Radiation harms, including cancer resulting from cumulative exposure to radiation, vary depending on the age at the start of screening; the number of scans received; and the person's exposure to other sources of radiation, particularly other medical imaging.) [None] : None [Good understanding] : Patient has a good understanding of lifestyle changes and steps needed to achieve self management goal

## 2021-10-12 NOTE — HISTORY OF PRESENT ILLNESS
[FreeTextEntry1] : Well visit [de-identified] : The patient denies anorexia, arthralgias, body aches, cyanosis, diaphoresis, dizziness, fatigue, fever, headaches, insomnia, joint pains, leg edema, loss of appetite, myalgias, night sweats, palpitations, weight gain or loss.\par

## 2021-10-12 NOTE — PHYSICAL EXAM
[No Acute Distress] : no acute distress [Well Nourished] : well nourished [Well Developed] : well developed [Well-Appearing] : well-appearing [Normal Sclera/Conjunctiva] : normal sclera/conjunctiva [PERRL] : pupils equal round and reactive to light [EOMI] : extraocular movements intact [Normal Outer Ear/Nose] : the outer ears and nose were normal in appearance [Normal Oropharynx] : the oropharynx was normal [No JVD] : no jugular venous distention [No Lymphadenopathy] : no lymphadenopathy [Supple] : supple [Thyroid Normal, No Nodules] : the thyroid was normal and there were no nodules present [No Respiratory Distress] : no respiratory distress  [No Accessory Muscle Use] : no accessory muscle use [Clear to Auscultation] : lungs were clear to auscultation bilaterally [Normal Rate] : normal rate  [Regular Rhythm] : with a regular rhythm [Normal S1, S2] : normal S1 and S2 [No Abdominal Bruit] : a ~M bruit was not heard ~T in the abdomen [No Varicosities] : no varicosities [No Edema] : there was no peripheral edema [No Palpable Aorta] : no palpable aorta [No Extremity Clubbing/Cyanosis] : no extremity clubbing/cyanosis [Soft] : abdomen soft [Non Tender] : non-tender [Non-distended] : non-distended [No Masses] : no abdominal mass palpated [No HSM] : no HSM [Normal Bowel Sounds] : normal bowel sounds [Normal Posterior Cervical Nodes] : no posterior cervical lymphadenopathy [Normal Anterior Cervical Nodes] : no anterior cervical lymphadenopathy [No CVA Tenderness] : no CVA  tenderness [No Spinal Tenderness] : no spinal tenderness [No Joint Swelling] : no joint swelling [Grossly Normal Strength/Tone] : grossly normal strength/tone [No Rash] : no rash [Coordination Grossly Intact] : coordination grossly intact [No Focal Deficits] : no focal deficits [Normal Gait] : normal gait [Deep Tendon Reflexes (DTR)] : deep tendon reflexes were 2+ and symmetric [Normal Affect] : the affect was normal [Normal Insight/Judgement] : insight and judgment were intact [de-identified] : 2/6 JANINE-> Axilla

## 2021-11-09 ENCOUNTER — APPOINTMENT (OUTPATIENT)
Dept: VASCULAR SURGERY | Facility: CLINIC | Age: 71
End: 2021-11-09

## 2021-12-21 ENCOUNTER — RX RENEWAL (OUTPATIENT)
Age: 71
End: 2021-12-21

## 2022-01-11 ENCOUNTER — APPOINTMENT (OUTPATIENT)
Dept: VASCULAR SURGERY | Facility: CLINIC | Age: 72
End: 2022-01-11

## 2022-02-01 ENCOUNTER — APPOINTMENT (OUTPATIENT)
Dept: VASCULAR SURGERY | Facility: CLINIC | Age: 72
End: 2022-02-01
Payer: MEDICARE

## 2022-02-08 ENCOUNTER — APPOINTMENT (OUTPATIENT)
Dept: VASCULAR SURGERY | Facility: CLINIC | Age: 72
End: 2022-02-08
Payer: MEDICARE

## 2022-02-08 VITALS
SYSTOLIC BLOOD PRESSURE: 126 MMHG | BODY MASS INDEX: 28.19 KG/M2 | HEIGHT: 68 IN | DIASTOLIC BLOOD PRESSURE: 68 MMHG | HEART RATE: 96 BPM | WEIGHT: 186 LBS

## 2022-02-08 DIAGNOSIS — I70.213 ATHEROSCLEROSIS OF NATIVE ARTERIES OF EXTREMITIES WITH INTERMITTENT CLAUDICATION, BILATERAL LEGS: ICD-10-CM

## 2022-02-08 PROCEDURE — 93925 LOWER EXTREMITY STUDY: CPT

## 2022-02-08 PROCEDURE — 99213 OFFICE O/P EST LOW 20 MIN: CPT

## 2022-02-08 PROCEDURE — 93978 VASCULAR STUDY: CPT

## 2022-02-11 ENCOUNTER — APPOINTMENT (OUTPATIENT)
Dept: HEART AND VASCULAR | Facility: CLINIC | Age: 72
End: 2022-02-11
Payer: MEDICARE

## 2022-02-11 PROCEDURE — 36415 COLL VENOUS BLD VENIPUNCTURE: CPT

## 2022-02-14 ENCOUNTER — APPOINTMENT (OUTPATIENT)
Dept: HEART AND VASCULAR | Facility: CLINIC | Age: 72
End: 2022-02-14
Payer: MEDICARE

## 2022-02-14 NOTE — HISTORY OF PRESENT ILLNESS
[FreeTextEntry1] : 71yoM s/p EVAR/AAA and s/p bl FEA in November 2020 for persistent b/l LE claudication, returns for surveillance of his AAA and runoff.  Currently, pt reports slightly improved LE claudication, but still admits to fatigue in the thighs and pain at the medial thighs b/l, associated w/persistent low back pain.  Pt recently saw his spine surgeon who states that there are no active spine/neuro issues at this time but has repeated an MRI L-S spine for further evaluation.

## 2022-02-14 NOTE — PROCEDURE
[FreeTextEntry1] : Duplex today demonstrates widely patent b/l FEA w/patch angioplasty, brisk distal runoff.

## 2022-02-14 NOTE — ASSESSMENT
[FreeTextEntry1] : 71yoM s/p EVAR/AAA and s/p bl FEA in November 2020 for persistent b/l LE claudication, returns for surveillance of his AAA and runoff.  Currently, pt reports slightly improved LE claudication, but still admits to fatigue in the thighs and pain at the medial thighs b/l, associated w/persistent low back pain.  Pt recently saw his spine surgeon who states that there are no active spine/neuro issues at this time but has repeated an MRI L-S spine for further evaluation.\par \par Duplex today demonstrates widely patent b/l FEA w/patch angioplasty, brisk distal runoff.  Recommend pt continue regular exercise, but reassured him that his pelvic circulation is widely patent and no vascular intervention is required at this time.  He will return to the office in 6mos for surveillance of his EVAR and distal revasc and f/u w/ortho spine for results of his MRI.\par \par I, Dr. Hdez, personally performed the evaluation and management (E/M) services for this established patient who presents today with (a) new problem(s)/exacerbation of (an) existing condition(s).  That E/M includes conducting the examination, assessing all new/exacerbated conditions, and establishing a new plan of care.  Today, ACP, Josep Olmstead, was here to observe my evaluation and management services for this new problem/exacerbated condition to be followed going forward.

## 2022-02-14 NOTE — PHYSICAL EXAM
[JVD] : no jugular venous distention  [Varicose Veins Of Lower Extremities] : not present [de-identified] : Well-nourished, NAD [] : not present [de-identified] : NC/AT, anicteric [de-identified] : SNTND [de-identified] : +mild scrotal edema, non-tender [de-identified] : FROM throughout, strength 5/5x4, no palpable cords [de-identified] : Well-healing b/l groin incisions

## 2022-02-14 NOTE — ADDENDUM
[FreeTextEntry1] : This note was written by Josep Mittal, acting as a scribe for Dr. Jayshree Hdez.  I, Dr. Jayshree Hdez, have read and attest that all the information, medical decision-making, and discharge instructions within are true and accurate.\par \par I, Dr. Jayshree Hdez, personally performed the evaluation and management (E/M) services for this established patient who presents today with (an) existing condition(s).  That E/M includes conducting the examination, assessing all conditions, and (re)establishing/reinforcing a plan of care.  Today, my ACP, Josep Mittal, was here to observe my evaluation and management services for this condition to be followed going forward.

## 2022-02-15 LAB
25(OH)D3 SERPL-MCNC: 36.2 NG/ML
ALBUMIN SERPL ELPH-MCNC: 4.3 G/DL
ALP BLD-CCNC: 63 U/L
ALT SERPL-CCNC: 14 U/L
ANION GAP SERPL CALC-SCNC: 14 MMOL/L
AST SERPL-CCNC: 16 U/L
BASOPHILS # BLD AUTO: 0.06 K/UL
BASOPHILS NFR BLD AUTO: 0.9 %
BILIRUB SERPL-MCNC: 0.4 MG/DL
BUN SERPL-MCNC: 15 MG/DL
CALCIUM SERPL-MCNC: 10.1 MG/DL
CHLORIDE SERPL-SCNC: 103 MMOL/L
CHOLEST SERPL-MCNC: 120 MG/DL
CO2 SERPL-SCNC: 23 MMOL/L
COVID-19 SPIKE DOMAIN ANTIBODY INTERPRETATION: POSITIVE
CREAT SERPL-MCNC: 0.98 MG/DL
EOSINOPHIL # BLD AUTO: 0.34 K/UL
EOSINOPHIL NFR BLD AUTO: 5.2 %
ESTIMATED AVERAGE GLUCOSE: 120 MG/DL
GLUCOSE SERPL-MCNC: 103 MG/DL
HBA1C MFR BLD HPLC: 5.8 %
HCT VFR BLD CALC: 46 %
HDLC SERPL-MCNC: 39 MG/DL
HGB BLD-MCNC: 14.4 G/DL
IMM GRANULOCYTES NFR BLD AUTO: 0.3 %
LDLC SERPL CALC-MCNC: 63 MG/DL
LYMPHOCYTES # BLD AUTO: 1.84 K/UL
LYMPHOCYTES NFR BLD AUTO: 28.1 %
MAN DIFF?: NORMAL
MCHC RBC-ENTMCNC: 30.8 PG
MCHC RBC-ENTMCNC: 31.3 GM/DL
MCV RBC AUTO: 98.3 FL
MONOCYTES # BLD AUTO: 0.5 K/UL
MONOCYTES NFR BLD AUTO: 7.6 %
NEUTROPHILS # BLD AUTO: 3.79 K/UL
NEUTROPHILS NFR BLD AUTO: 57.9 %
NONHDLC SERPL-MCNC: 81 MG/DL
PLATELET # BLD AUTO: 215 K/UL
POTASSIUM SERPL-SCNC: 4.8 MMOL/L
PROT SERPL-MCNC: 6.8 G/DL
PSA FREE FLD-MCNC: 9 %
PSA FREE SERPL-MCNC: 0.02 NG/ML
PSA SERPL-MCNC: 0.19 NG/ML
RBC # BLD: 4.68 M/UL
RBC # FLD: 14.9 %
SARS-COV-2 AB SERPL IA-ACNC: >250 U/ML
SODIUM SERPL-SCNC: 140 MMOL/L
T3 SERPL-MCNC: 102 NG/DL
T3FREE SERPL-MCNC: 2.75 PG/ML
T4 FREE SERPL-MCNC: 1.5 NG/DL
T4 SERPL-MCNC: 7.4 UG/DL
TRIGL SERPL-MCNC: 93 MG/DL
TSH SERPL-ACNC: 1.42 UIU/ML
WBC # FLD AUTO: 6.55 K/UL

## 2022-03-21 ENCOUNTER — NON-APPOINTMENT (OUTPATIENT)
Age: 72
End: 2022-03-21

## 2022-03-21 ENCOUNTER — APPOINTMENT (OUTPATIENT)
Dept: HEART AND VASCULAR | Facility: CLINIC | Age: 72
End: 2022-03-21
Payer: MEDICARE

## 2022-03-21 VITALS
RESPIRATION RATE: 14 BRPM | DIASTOLIC BLOOD PRESSURE: 86 MMHG | SYSTOLIC BLOOD PRESSURE: 150 MMHG | WEIGHT: 186 LBS | HEIGHT: 68 IN | BODY MASS INDEX: 28.19 KG/M2

## 2022-03-21 DIAGNOSIS — F02.80 DEMENTIA WITH LEWY BODIES: ICD-10-CM

## 2022-03-21 DIAGNOSIS — G31.83 DEMENTIA WITH LEWY BODIES: ICD-10-CM

## 2022-03-21 PROCEDURE — 99214 OFFICE O/P EST MOD 30 MIN: CPT

## 2022-03-21 PROCEDURE — 93000 ELECTROCARDIOGRAM COMPLETE: CPT

## 2022-03-21 NOTE — END OF VISIT
[Time Spent: ___ minutes] : I have spent [unfilled] minutes of time on the encounter. [FreeTextEntry3] : All medical record entries made by the Scribe were at my, Dr. Hector Agudelo, direction and personally dictated by me on 03/21/2022. I have reviewed the chart and agree that the record accurately reflects my personal performance of the history, physical exam, assessment and plan. I have also personally directed, reviewed, and agreed with the chart.

## 2022-03-21 NOTE — DISCUSSION/SUMMARY
[Coronary Artery Disease] : coronary artery disease [Dietary Modification] : dietary modification [Smoking Cessation] : smoking cessation [Hypertension] : hypertension [Stable] : stable [None] : There are no changes in medication management [Low Sodium Diet] : low sodium diet [NSAIDs Avoidance] : non-steroidal anti-inflammatory drugs avoidance [Patient] : the patient [FreeTextEntry1] : Cardiac Murmur - Stable; no further intervention at this time (see last echo). \par Prediabetes- Blood work reviewed with patient. Maintain a low caloric, low sodium, low cholesterol diet. Dietary counseling given, diet and exercise discussed in detail.\par Referral given for Neuro and Psych to confirm diagnosis of Lewy-Body Dimentia.

## 2022-03-21 NOTE — PHYSICAL EXAM

## 2022-03-21 NOTE — HISTORY OF PRESENT ILLNESS
[FreeTextEntry1] : Denies Chest Pain, SOB, Dizziness, Leg edema, Orthopnea, PND, Palpitations, Syncope, KRISHNA, Diaphoresis. \par Patient denies change in appetite, fatigue, heat or cold intolerance, myalgias, polydipsia, polyphagia, polyuria, tingling, numbness.

## 2022-03-21 NOTE — ADDENDUM
[FreeTextEntry1] : Documented by Sergio Carmen acting as a scribe for Dr. Hector Agudelo on 03/21/2022.

## 2022-05-19 ENCOUNTER — RX RENEWAL (OUTPATIENT)
Age: 72
End: 2022-05-19

## 2022-07-18 ENCOUNTER — RX RENEWAL (OUTPATIENT)
Age: 72
End: 2022-07-18

## 2022-08-18 ENCOUNTER — RX RENEWAL (OUTPATIENT)
Age: 72
End: 2022-08-18

## 2022-09-06 NOTE — DIETITIAN INITIAL EVALUATION ADULT. - PROBLEM SELECTOR PLAN 1
done
[Other ___] : a [unfilled] visit for
[FreeTextEntry2] : PAD/intra stent stenosis
- Admit to neurosurgery telemetry bed   - Q2 neuro checks   - ID consult for empiric antibiotic recommendations  - MRI Lumbar spine with and without contrast   - Wound culture   - Inflammatory labs: ESR, CRP   - D/w Dr. Alvarez

## 2022-09-23 ENCOUNTER — APPOINTMENT (OUTPATIENT)
Dept: HEART AND VASCULAR | Facility: CLINIC | Age: 72
End: 2022-09-23

## 2022-09-29 ENCOUNTER — LABORATORY RESULT (OUTPATIENT)
Age: 72
End: 2022-09-29

## 2022-09-29 ENCOUNTER — APPOINTMENT (OUTPATIENT)
Dept: HEART AND VASCULAR | Facility: CLINIC | Age: 72
End: 2022-09-29

## 2022-09-29 ENCOUNTER — NON-APPOINTMENT (OUTPATIENT)
Age: 72
End: 2022-09-29

## 2022-09-29 VITALS
WEIGHT: 193 LBS | HEART RATE: 76 BPM | DIASTOLIC BLOOD PRESSURE: 86 MMHG | SYSTOLIC BLOOD PRESSURE: 134 MMHG | BODY MASS INDEX: 29.25 KG/M2 | RESPIRATION RATE: 16 BRPM | HEIGHT: 68 IN

## 2022-09-29 PROCEDURE — 93880 EXTRACRANIAL BILAT STUDY: CPT

## 2022-09-29 PROCEDURE — 93000 ELECTROCARDIOGRAM COMPLETE: CPT

## 2022-09-29 PROCEDURE — 36415 COLL VENOUS BLD VENIPUNCTURE: CPT

## 2022-09-29 PROCEDURE — 93306 TTE W/DOPPLER COMPLETE: CPT

## 2022-09-29 PROCEDURE — 99214 OFFICE O/P EST MOD 30 MIN: CPT | Mod: 25

## 2022-09-29 PROCEDURE — ZZZZZ: CPT

## 2022-09-29 NOTE — HISTORY OF PRESENT ILLNESS
[FreeTextEntry1] : Denies Chest Pain, SOB, Dizziness, Leg edema, Orthopnea, PND, Palpitations, Syncope, KRISHNA, Diaphoresis. \par HTN/ CAD - Echo ordered to assess LV function/possible valvular heart disease. \par Patient denies change in appetite, fatigue, heat or cold intolerance, myalgias, polydipsia, polyphagia, polyuria, tingling, numbness.

## 2022-09-29 NOTE — PHYSICAL EXAM

## 2022-09-29 NOTE — END OF VISIT
[FreeTextEntry3] : All medical record entries made by the Scribe were at my, Dr. Hector Agudelo, direction and personally dictated by me on 09/29/2022. I have reviewed the chart and agree that the record accurately reflects my personal performance of the history, physical exam, assessment and plan. I have also personally directed, reviewed, and agreed with the chart.  [Time Spent: ___ minutes] : I have spent [unfilled] minutes of time on the encounter.

## 2022-09-29 NOTE — DISCUSSION/SUMMARY
[Coronary Artery Disease] : coronary artery disease [Dietary Modification] : dietary modification [Hypertension] : hypertension [Stable] : stable [None] : There are no changes in medication management [Low Sodium Diet] : low sodium diet [NSAIDs Avoidance] : non-steroidal anti-inflammatory drugs avoidance [Patient] : the patient [de-identified] : s/p CABG [FreeTextEntry1] : Carotid artery disease- Stable; no further intervention at this time. \par Prediabetes - Blood work drawn. Maintain a low caloric, low sodium, low cholesterol diet. Dietary counseling given, diet and exercise discussed in detail.

## 2022-09-29 NOTE — ADDENDUM
[FreeTextEntry1] : Documented by Elio Plascencia acting as a scribe for Dr. Hector Agudelo on 09/29/2022.

## 2022-10-03 LAB
25(OH)D3 SERPL-MCNC: 37.2 NG/ML
ALBUMIN SERPL ELPH-MCNC: 4.3 G/DL
ALP BLD-CCNC: 74 U/L
ALT SERPL-CCNC: 12 U/L
ANION GAP SERPL CALC-SCNC: 12 MMOL/L
AST SERPL-CCNC: 12 U/L
BASOPHILS # BLD AUTO: 0.04 K/UL
BASOPHILS NFR BLD AUTO: 0.6 %
BILIRUB SERPL-MCNC: 0.8 MG/DL
BUN SERPL-MCNC: 18 MG/DL
CALCIUM SERPL-MCNC: 9.7 MG/DL
CHLORIDE SERPL-SCNC: 104 MMOL/L
CHOLEST SERPL-MCNC: 131 MG/DL
CO2 SERPL-SCNC: 27 MMOL/L
COVID-19 SPIKE DOMAIN ANTIBODY INTERPRETATION: POSITIVE
CREAT SERPL-MCNC: 0.93 MG/DL
EGFR: 87 ML/MIN/1.73M2
EOSINOPHIL # BLD AUTO: 0.17 K/UL
EOSINOPHIL NFR BLD AUTO: 2.4 %
ESTIMATED AVERAGE GLUCOSE: 111 MG/DL
FOLATE SERPL-MCNC: 15.1 NG/ML
GLUCOSE SERPL-MCNC: 80 MG/DL
HBA1C MFR BLD HPLC: 5.5 %
HCT VFR BLD CALC: 47.8 %
HDLC SERPL-MCNC: 46 MG/DL
HGB BLD-MCNC: 15.2 G/DL
IMM GRANULOCYTES NFR BLD AUTO: 0.3 %
LDLC SERPL CALC-MCNC: 71 MG/DL
LYMPHOCYTES # BLD AUTO: 1.72 K/UL
LYMPHOCYTES NFR BLD AUTO: 24.5 %
MAN DIFF?: NORMAL
MCHC RBC-ENTMCNC: 31.6 PG
MCHC RBC-ENTMCNC: 31.8 GM/DL
MCV RBC AUTO: 99.4 FL
MONOCYTES # BLD AUTO: 0.59 K/UL
MONOCYTES NFR BLD AUTO: 8.4 %
NEUTROPHILS # BLD AUTO: 4.49 K/UL
NEUTROPHILS NFR BLD AUTO: 63.8 %
NONHDLC SERPL-MCNC: 85 MG/DL
PLATELET # BLD AUTO: 176 K/UL
POTASSIUM SERPL-SCNC: 4.8 MMOL/L
PROT SERPL-MCNC: 6.9 G/DL
PSA FREE FLD-MCNC: NORMAL %
PSA FREE SERPL-MCNC: <0.01 NG/ML
PSA SERPL-MCNC: 0.13 NG/ML
RBC # BLD: 4.81 M/UL
RBC # FLD: 14.3 %
SARS-COV-2 AB SERPL IA-ACNC: >250 U/ML
SODIUM SERPL-SCNC: 143 MMOL/L
T3 SERPL-MCNC: 113 NG/DL
T3FREE SERPL-MCNC: 3.1 PG/ML
T3RU NFR SERPL: 1 TBI
T4 FREE SERPL-MCNC: 1.1 NG/DL
T4 SERPL-MCNC: 7.2 UG/DL
TRIGL SERPL-MCNC: 73 MG/DL
TSH SERPL-ACNC: 1.26 UIU/ML
VIT B12 SERPL-MCNC: 493 PG/ML
WBC # FLD AUTO: 7.03 K/UL

## 2022-10-07 NOTE — PROGRESS NOTE ADULT - PROVIDER SPECIALTY LIST ADULT
Infectious Disease
Neurosurgery
Infectious Disease
 used

## 2022-11-03 ENCOUNTER — APPOINTMENT (OUTPATIENT)
Dept: VASCULAR SURGERY | Facility: CLINIC | Age: 72
End: 2022-11-03

## 2022-11-03 DIAGNOSIS — I71.40 ABDOMINAL AORTIC ANEURYSM, WITHOUT RUPTURE, UNSPECIFIED: ICD-10-CM

## 2022-11-03 PROCEDURE — 93978 VASCULAR STUDY: CPT

## 2022-11-03 PROCEDURE — 99213 OFFICE O/P EST LOW 20 MIN: CPT

## 2022-11-04 NOTE — PHYSICAL EXAM
[JVD] : no jugular venous distention  [Normal Thyroid] : the thyroid was normal [Normal Breath Sounds] : Normal breath sounds [Normal Rate and Rhythm] : normal rate and rhythm [1+] : right 1+ [2+] : left 2+ [Ankle Swelling (On Exam)] : present [Ankle Swelling Bilaterally] : bilaterally  [Ankle Swelling On The Right] : mild [Varicose Veins Of Lower Extremities] : not present [] : not present [No Rash or Lesion] : No rash or lesion [Alert] : alert [Calm] : calm [de-identified] : Well-nourished, NAD [de-identified] : NC/AT, anicteric [de-identified] : SNTND [de-identified] : FROM throughout, strength 5/5x4, no palpable cords [de-identified] : +mild scrotal edema, non-tender [de-identified] : Well-healing b/l groin incisions

## 2022-11-04 NOTE — PROCEDURE
[FreeTextEntry1] : Duplex today demonstrates widely patent EVAR/AAA, aneurysm sac measures 3.9x3.9cm and b/l FEA w/patch angioplasty, brisk distal runoff.

## 2022-11-04 NOTE — ASSESSMENT
[FreeTextEntry1] : 71yoM s/p EVAR/AAA and s/p bl FEA in November 2020 for persistent b/l LE claudication, returns for surveillance of his AAA and runoff.  Currently, pt reports slightly improved LE claudication, but still admits to fatigue in the thighs and pain at the medial thighs b/l, associated w/persistent low back pain.  Pt recently saw his spine surgeon who states that there are no active spine/neuro issues at this time but has repeated an MRI L-S spine for further evaluation.\par \par Duplex today demonstrates widely patent EVAR/AAA, aneurysm sac measures 3.9x3.9cm and b/l FEA w/patch angioplasty, brisk distal runoff.  Recommend pt continue regular exercise, but reassured him that his pelvic circulation is widely patent and no vascular intervention is required at this time.  He will return to the office in 6mos for surveillance of his EVAR and distal revasc and f/u w/ortho spine for results of his MRI.

## 2022-11-07 NOTE — H&P ADULT - PMH
Caller: SHANDRA    Relationship: [facility]     Best call back number:    [phone]       Requested Prescriptions:   LISINOPRIL 20 MG NEEDED      Pharmacy where request should be sent: [address] [phone] [place] [phone]      Additional details provided by patient: SHANDRA SAYS WRONG DOSE WAS SENT TO PHARMACYlisinopril (PRINIVIL,ZESTRIL) 20 MG tablet  , NEEDING CORRECTION     Does the patient have less than a 3 day supply:  [x] Yes  [] No    Les Conn   11/07/22 11:13 EST           
AAA (abdominal aortic aneurysm)    Aortic thromboembolism    Bladder cancer    Childhood asthma    Coronary artery disease    GERD (gastroesophageal reflux disease)    Hepatitis B    Hyperlipidemia    Hypertension    Lower back pain    PAD (peripheral artery disease)    Prostate cancer  Radiation [place]  PVD (peripheral vascular disease)

## 2022-11-22 NOTE — HISTORY OF PRESENT ILLNESS
[address] [facility]NAGEMENT   [facility]       Patient ID: Dorothy Mchugh is a [de-identified] y o  male Date of Birth 1942     Location of Service: [facility]     Performed wound round with: Wound team     Chief Complaint :  Left and right groin, and left lower leg    Wound Instructions:  Wound:  Coccyx  Discontinue previous wound order  Cleanse the skin with soap and water, pat dry  Apply zinc oxide to wound bed  Frequency : twice a day and prn for soiling    Location : Left groin  D/c wound order    Location:  Left lower leg  Cleansed with normal saline solution  Apply collagen dressing ( xerofoam if not available) to wound bed and covered with ABD and wrap with Kerlix  3 times a week and p r n  for soiling    Offload all wounds  Turn and reposition frequently  Increase protein intake  Monitor for any sign of infection or worsening, inform PCP or patient's primary physician in your facility  Allergies  Pneumococcal vaccine      Assessment & Plan:  1  Surgical wound present  Assessment & Plan:  History :  - LEFT FEMORAL ARTERY THROMBECTOMY, LEFT LOWER EXTREMITY THROMBOEMBOLECTOMY AND FASCIOTOMIES, RIGHT TO LEFT FEMORAL-FEMORAL BYPASS on 9/11/2022  - the wound on the left groin  -healed  - the wound on the left lower leg medial decrease in size, change treatment to collagen  - the wound on the left lower leg lateral - decrease in wound size  Change local wound care to collagen  - selective debridement performed  - patient currently on protein supplement, HiCal and Prosource  - followup : two weeks    Orders:  -     Debridement           Subjective:   9/20/2022This is a [de-identified] y o , male referred to our service for wound/ skin alterations on coccyx and left lower leg  Patient have a complex medical history including but not limited to  Cancer [facility]), COPD (chronic obstructive pulmonary disease) ([facility] ), Decubitus ulcer of sacral region, stage 3 ([address] ), Diverticulitis, ETOH abuse and Nicotine [FreeTextEntry1] : Pt. presents for blood work for overall healthcare maintenance as prescribed by his physician.\par The patient denies fevers, chills, dizziness, fatigue, lightheadedness, loss of appetite.\par  abuse   Patient was referred by Senior Care Team  Patient was seen in collaboration with the facility wound team      Wound History:   As per medical record review, patient had critical limb ischemia and had LEFT FEMORAL ARTERY THROMBECTOMY, LEFT LOWER EXTREMITY THROMBOEMBOLECTOMY AND FASCIOTOMIES, RIGHT TO LEFT FEMORAL-FEMORAL BYPASS on 9/11/2022  The wound on the coccyx is chronic  Received patient in bed, seems comfortable  Denies pain  Current treatment on wound is dry sterile dressing  Patient currently on isoloation for COVID     9/27/2022 Follow up for wound on the buttocks and left groin  Received patient, not in distress  Facility staff did not report any significant issues related to the wound  Denies pain, no fever  10/4/2022 Follow up for wound on the coccyx  Received patient, not in distress  Facility staff did not report any significant issues related to the wound  As per report, patient still on Bactrim for infection on the left groin  Patient will be seen by surgeon today at 7 pm     10/11/2022 Follow up for wound on the coccyx    Received patient, not in distress  Facility staff did not report any significant issues related to the wound  Denies pain  10/18/2022 Follow up for wound on the coccyx   Received patient, not in distress  Facility staff did not report any significant issues related to the wound  No new issues  10/25/2022 Follow up for wound on the groin and left lower leg   Received patient, not in distress  Facility staff did not report any significant issues related to the wound  As per report, patient was recently seen by surgeon and suture was removed  11/1/2022 Follow up for wound on the left groin, left lower leg   Received patient, not in distress  Facility staff did not report any significant issues related to the wound  Denies pain  11/8/2022 Follow up for wound on the left groin and left lower leg   Received patient, not in distress   Facility staff did not report any significant issues related to the wound  As per report, the collagen dressing still not available  Patient denies pain  11/15/2022 Follow up for wound on the left groin and left lower leg   Received patient, not in distress  Facility staff did not report any significant issues related to the wound  As per report, patient had cellulitis on the wound on the left groin and completed oral antibiotic  Denies pain  Denies fever  11/22/2022 Follow up for wound on the left groin and left lower leg   Received patient, not in distress  Facility staff did not report any significant issues related to the wound  Review of Systems   Constitutional: Negative  HENT: Negative  Eyes: Negative  Respiratory: Negative  Cardiovascular: Negative  Gastrointestinal: Negative  Endocrine: Negative  Genitourinary: Negative  Musculoskeletal: Positive for gait problem  Skin: Positive for wound  See HPI   Hematological: Negative  Psychiatric/Behavioral: Negative  All other systems reviewed and are negative  Objective:    Physical Exam  Constitutional:       Appearance: Normal appearance  HENT:      Head: Normocephalic and atraumatic  Nose: Nose normal       Mouth/Throat:      Mouth: Mucous membranes are moist    Eyes:      Conjunctiva/sclera: Conjunctivae normal    Pulmonary:      Effort: Pulmonary effort is normal    Abdominal:      Tenderness: There is no abdominal tenderness  Genitourinary:     Comments: continent  Musculoskeletal:      Cervical back: Normal range of motion  Comments: LROM   Skin:     Coloration: Pallor: xh       Findings: Lesion present        Comments: Right groin - surgical incision healed  Left groin - wound - healed  Left lower leg medial - wound size is 5 5 x 0 7 x 0 1 cm  , 100% granulation, small amount of serous drainage, periwound is normal, no malodor, no obvious sign of infection    Left lower leg lateral - wound size is 6 5 x 1 x 0 1 cm ,  100% granulation, with small amount of serous drainage, periwound is normal no malodor, no obvious sign of infection   Neurological:      Mental Status: He is alert  Gait: Gait abnormal    Psychiatric:         Mood and Affect: Mood normal          Behavior: Behavior normal               Debridement   Universal Protocol:  Consent: Verbal consent obtained  Risks and benefits: risks, benefits and alternatives were discussed  Consent given by: patient  Time out: Immediately prior to procedure a "time out" was called to verify the correct patient, procedure, equipment, support staff and site/side marked as required  Timeout called at: 11/22/2022 8:00 AM   Patient understanding: patient states understanding of the procedure being performed  Patient identity confirmed: verbally with patient      Performed by: NP (Left lower leg medial)  Debridement type: selective  Pain control: none  Post-debridement measurements  Length (cm): 5 5  Width (cm): 0 7  Depth (cm): 0 1  Percent debrided: 100%  Surface Area (cm^2): 3 85  Area debrided (cm^2): 3 85  Volume (cm^3): 0 39  Devitalized tissue debrided: biofilm and fibrin  Instrument(s) utilized: curette  Bleeding: small  Hemostasis obtained with: pressure  Procedural pain (0-10): 0  Post-procedural pain: 0   Response to treatment: procedure was tolerated well    Debridement   Universal Protocol:  Consent: Verbal consent obtained  Risks and benefits: risks, benefits and alternatives were discussed  Consent given by: patient  Time out: Immediately prior to procedure a "time out" was called to verify the correct patient, procedure, equipment, support staff and site/side marked as required    Timeout called at: 11/22/2022 8:00 AM   Patient understanding: patient states understanding of the procedure being performed  Patient identity confirmed: verbally with patient      Performed by: NP (Left lower lateral)  Debridement type: selective  Pain control: none  Post-debridement measurements  Length (cm): 6 5  Width (cm): 1  Depth (cm): 0 1  Percent debrided: 100%  Surface Area (cm^2): 6 5  Area debrided (cm^2): 6 5  Volume (cm^3): 0 65  Devitalized tissue debrided: biofilm, fibrin and eschar  Instrument(s) utilized: curette  Bleeding: small  Hemostasis obtained with: pressure  Procedural pain (0-10): 0  Post-procedural pain: 0   Response to treatment: procedure was tolerated well                 Patient's care was coordinated with nursing facility staff  Recent vitals, labs and updated medications were reviewed on EMR or chart system of facility  Past Medical, surgical, social, medication and allergy history and patient's previous records were reviewed and updated as appropriate: Most up-to date information is available in the facility EMR where the patient is currently admitted      Patient Active Problem List   Diagnosis   • Tremor, essential   • Other specified polyneuropathies   • Malignant neoplasm of prostate ([facility] )   • Chronic pansinusitis   • Alcoholic cirrhosis of liver (HCC)   • COPD (chronic obstructive pulmonary disease) (HCC)   • Gout   • Hepatocellular carcinoma (HCC)   • Hypertension   • LIANA (obstructive sleep apnea)   • Pulmonary nodule   • Contracture of palmar fascia   • Constipation   • Diverticulitis   • Gastroesophageal reflux disease   • Impaired fasting glucose   • Lyme disease   • Tremor   • Tobacco dependence syndrome   • History of prostate cancer   • Thyroid nodule   • Synovial cyst of right popliteal space   • Hyperplasia of prostate   • Ventral hernia without obstruction or gangrene   • Diverticulosis   • Encounter for well adult exam with abnormal findings   • Leucopenia   • Spondylosis of lumbar region without myelopathy or radiculopathy   • Thrombocytopenia (HCC)   • Mild episode of recurrent major depressive disorder ([facility] )   • Medicare annual wellness visit, subsequent   • Malignant neoplasm of upper lobe of right lung ([facility] ) • Gross hematuria   • Non compliance w medication regimen   • Urothelial carcinoma of bladder (HCC)   • Severe protein-energy malnutrition (HCC)   • Metastasis to lung [facility])   • Malignant neoplasm of lower lobe of right lung (HCC)   • Hematuria   • Alcohol dependence, daily use (HCC)   • Seborrheic dermatitis of scalp   • Tobacco abuse   • Fall   • Gait abnormality   • Hypothyroidism due to medication   • Closed fracture of second lumbar vertebra (HCC)   • Pressure injury of sacral region, stage 3 ([facility] )   • Surgical wound present   • COVID     Past Medical History:   Diagnosis Date   • Anemia     dur to blood loss in the past   • BMI 25 0-25 9,adult 5/24/2019   • BPH (benign prostatic hyperplasia) 2002   • 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[facility] )    • Cancer (HCC)     hepatocellular, prostate   • Chronic bronchitis (HCC)    • Chronic bronchitis ([facility] ) 7/19/2016    Last Assessment & Plan:  He has symptoms of chronic bronchitis  He would of course benefit from smoking cessation  I have recommended a trial of Spiriva     • Colitis    • COPD (chronic obstructive pulmonary disease) (HCC)     chronic bronchitis   • Decubitus ulcer of sacral region, stage 3 ([facility] [facility] ) 3/12/2018   • Diverticulitis    • Essential tremor    • ETOH abuse    • GERD (gastroesophageal reflux disease)    • Gout    • Hearing loss    • Hemorrhoids    • History of Lyme disease    • History of thrombocytopenia     chronic mild   • History of transfusion    • HTN (hypertension)    • Hydrocele    • Hyperlipidemia    • Hypertension    • Hyperthyroidism    • Irritable bowel syndrome    • Liver disease     cirrhosis   • Lyme disease    • Meningioma (HCC)    • Meningioma (HCC)     left frontal- followed by Neurosurgery   • Nicotine abuse     2-3 packs   • Obstructive sleep apnea     unable to tolerate CPAP   • Overweight with body mass index (BMI) of 25 to 25 9 in adult 11/3/2021   • Pressure injury of skin     Stage 3 that healed and has reoccurred   • Pulmonary nodule    • Sensory polyneuropathy    • Sleep apnea    • Spondylosis of lumbar region without myelopathy or radiculopathy 10/25/2019   • Thyroid nodule    • Tobacco abuse    • Tremor      Past Surgical History:   Procedure Laterality Date   • APPENDECTOMY     • CATARACT EXTRACTION      debra   • ESOPHAGOGASTRODUODENOSCOPY     • HAND SURGERY  04/30/2018   • LIVER SURGERY  03/09/2018   • ND REMV PILONIDAL LESION SIMPLE N/A 9/28/2018    Procedure: PILONIDAL CYSTECTOMY;  Surgeon: Aleida Knapp DO;  Location: [address] [place] [address];  Service: General   • THYROID SURGERY     • TONSILLECTOMY       Social History     Socioeconomic History   • Marital status:       Spouse name: None   • Number of children: None   • Years of education: None   • Highest education level: None   Occupational History   • Occupation:    Tobacco Use   • Smoking status: Every Day     Packs/day: 1 00     Types: Cigarettes     Start date: 3/25/1961   • Smokeless tobacco: Never   • Tobacco comments:     hx of nicotine abuse 2-3 packs   Vaping Use   • Vaping Use: Never used   Substance and Sexual Activity   • Alcohol use: Yes     Comment: 2-3 oz daily    • Drug use: No   • Sexual activity: Not Currently     Partners: Female   Other Topics Concern   • None   Social History Narrative    Hx of caffeine abuse    Hx of ETOH abuse     Social Determinants of Health     Financial Resource Strain: Not on file   Food Insecurity: Not on file   Transportation Needs: Not on file   Physical Activity: Not on file   Stress: Not on file   Social Connections: Not on file   Intimate Partner Violence: Not on file   Housing Stability: Not on file        Current Outpatient Medications:   •  albuterol (PROVENTIL HFA,VENTOLIN HFA) 90 mcg/act inhaler, INHALE 2 PUFFS BY MOUTH EVERY 6 HOURS AS NEEDED FOR WHEEZING, Disp: 18 g, Rfl: 2  •  budesonide-formoterol (Symbicort) 160-4 5 mcg/act inhaler, Inhale 2 puffs 2 (two) times a day Rinse mouth after use , Disp: 10 2 g, Rfl: 2  • Cholecalciferol 1000 units CHEW, Chew 5,000 Units daily, Disp: , Rfl:   •  losartan (COZAAR) 50 mg tablet, Take 1 tablet (50 mg total) by mouth daily, Disp: 30 tablet, Rfl: 10  Family History   Problem Relation Age of Onset   • Diabetes Father    • Other Father         colon problems/colostomy   • Alcohol abuse Sister               Coordination of Care: Wound team aware of the treatment plan  Facility nurse will provide wound treatment and monitor the wound for any changes  Patient / Staff education : Patient / Staff was given education on sign of infection and pressure ulcer prevention  Patient/ Staff verbalized understanding     Follow up :  Next week    Voice-recognition software may have been used in the preparation of this document  Occasional wrong word or "sound-alike" substitutions may have occurred due to the inherent limitations of voice recognition software  Interpretation should be guided by context        Robby Atkins

## 2022-12-07 ENCOUNTER — RX RENEWAL (OUTPATIENT)
Age: 72
End: 2022-12-07

## 2023-02-14 ENCOUNTER — RX RENEWAL (OUTPATIENT)
Age: 73
End: 2023-02-14

## 2023-03-17 NOTE — ED PROVIDER NOTE - RESPIRATORY, MLM
Pt's daughter Gina is calling and asking if Abrahan can be seen today by [place] instead of Monday 3/20? He is really uncomfortable. Gina can be reached at   Telephone Information:   [email or web address] [phone]   [email or web address] [phone]        Breath sounds clear and equal bilaterally.

## 2023-03-28 ENCOUNTER — LABORATORY RESULT (OUTPATIENT)
Age: 73
End: 2023-03-28

## 2023-03-28 ENCOUNTER — APPOINTMENT (OUTPATIENT)
Dept: HEART AND VASCULAR | Facility: CLINIC | Age: 73
End: 2023-03-28
Payer: MEDICARE

## 2023-03-28 ENCOUNTER — NON-APPOINTMENT (OUTPATIENT)
Age: 73
End: 2023-03-28

## 2023-03-28 VITALS
HEART RATE: 79 BPM | BODY MASS INDEX: 28.79 KG/M2 | SYSTOLIC BLOOD PRESSURE: 125 MMHG | RESPIRATION RATE: 14 BRPM | DIASTOLIC BLOOD PRESSURE: 70 MMHG | WEIGHT: 190 LBS | HEIGHT: 68 IN

## 2023-03-28 DIAGNOSIS — Z23 ENCOUNTER FOR IMMUNIZATION: ICD-10-CM

## 2023-03-28 DIAGNOSIS — Z20.822 CONTACT WITH AND (SUSPECTED) EXPOSURE TO COVID-19: ICD-10-CM

## 2023-03-28 DIAGNOSIS — N39.0 URINARY TRACT INFECTION, SITE NOT SPECIFIED: ICD-10-CM

## 2023-03-28 DIAGNOSIS — I73.9 PERIPHERAL VASCULAR DISEASE, UNSPECIFIED: ICD-10-CM

## 2023-03-28 PROCEDURE — 36415 COLL VENOUS BLD VENIPUNCTURE: CPT

## 2023-03-28 PROCEDURE — 93000 ELECTROCARDIOGRAM COMPLETE: CPT

## 2023-03-28 PROCEDURE — 99214 OFFICE O/P EST MOD 30 MIN: CPT | Mod: 25

## 2023-03-28 NOTE — END OF VISIT
[FreeTextEntry3] : All medical record entries made by the Scribe were at my, Dr. Hector Agudelo, direction and personally dictated by me on 03/28/2023. I have reviewed the chart and agree that the record accurately reflects my personal performance of the history, physical exam, assessment and plan. I have also personally directed, reviewed, and agreed with the chart.  [Time Spent: ___ minutes] : I have spent [unfilled] minutes of time on the encounter.

## 2023-03-28 NOTE — PHYSICAL EXAM

## 2023-03-28 NOTE — HISTORY OF PRESENT ILLNESS
[FreeTextEntry1] : Denies Chest Pain, SOB, Dizziness, Leg edema, Orthopnea, PND, Palpitations, Syncope, KRISHNA, Diaphoresis. \par Patient denies change in appetite, fatigue, heat/cold intolerance, myalgias, polydipsia, polyphagia, polyuria, tingling, numbness.

## 2023-03-28 NOTE — DISCUSSION/SUMMARY
[Coronary Artery Disease] : coronary artery disease [Dietary Modification] : dietary modification [Hypertension] : hypertension [Stable] : stable [None] : There are no changes in medication management [Low Sodium Diet] : low sodium diet [NSAIDs Avoidance] : non-steroidal anti-inflammatory drugs avoidance [Patient] : the patient [de-identified] : s/p CABG [FreeTextEntry1] : Prediabetes - Blood work drawn. Maintain a low caloric, low sodium, low cholesterol diet. Dietary counseling given, diet and exercise discussed in detail.

## 2023-03-29 LAB
25(OH)D3 SERPL-MCNC: 49.4 NG/ML
ALBUMIN SERPL ELPH-MCNC: 4.5 G/DL
ALP BLD-CCNC: 72 U/L
ALT SERPL-CCNC: 8 U/L
ANION GAP SERPL CALC-SCNC: 13 MMOL/L
AST SERPL-CCNC: 14 U/L
BASOPHILS # BLD AUTO: 0.05 K/UL
BASOPHILS NFR BLD AUTO: 0.8 %
BILIRUB SERPL-MCNC: 1.1 MG/DL
BUN SERPL-MCNC: 15 MG/DL
CALCIUM SERPL-MCNC: 10 MG/DL
CHLORIDE SERPL-SCNC: 101 MMOL/L
CHOLEST SERPL-MCNC: 134 MG/DL
CO2 SERPL-SCNC: 25 MMOL/L
CREAT SERPL-MCNC: 0.97 MG/DL
EGFR: 82 ML/MIN/1.73M2
EOSINOPHIL # BLD AUTO: 0.17 K/UL
EOSINOPHIL NFR BLD AUTO: 2.7 %
ESTIMATED AVERAGE GLUCOSE: 111 MG/DL
FOLATE SERPL-MCNC: 16.1 NG/ML
GLUCOSE SERPL-MCNC: 85 MG/DL
HBA1C MFR BLD HPLC: 5.5 %
HCT VFR BLD CALC: 44.5 %
HDLC SERPL-MCNC: 43 MG/DL
HGB BLD-MCNC: 14 G/DL
IMM GRANULOCYTES NFR BLD AUTO: 0.2 %
LDLC SERPL CALC-MCNC: 73 MG/DL
LYMPHOCYTES # BLD AUTO: 1.82 K/UL
LYMPHOCYTES NFR BLD AUTO: 29.2 %
MAN DIFF?: NORMAL
MCHC RBC-ENTMCNC: 30.8 PG
MCHC RBC-ENTMCNC: 31.5 GM/DL
MCV RBC AUTO: 98 FL
MONOCYTES # BLD AUTO: 0.48 K/UL
MONOCYTES NFR BLD AUTO: 7.7 %
NEUTROPHILS # BLD AUTO: 3.7 K/UL
NEUTROPHILS NFR BLD AUTO: 59.4 %
NONHDLC SERPL-MCNC: 91 MG/DL
PLATELET # BLD AUTO: 159 K/UL
POTASSIUM SERPL-SCNC: 4.9 MMOL/L
PROT SERPL-MCNC: 7.2 G/DL
RBC # BLD: 4.54 M/UL
RBC # FLD: 14.4 %
SODIUM SERPL-SCNC: 139 MMOL/L
T3 SERPL-MCNC: 101 NG/DL
T3FREE SERPL-MCNC: 2.78 PG/ML
T3RU NFR SERPL: 1 TBI
T4 FREE SERPL-MCNC: 1.3 NG/DL
T4 SERPL-MCNC: 7.2 UG/DL
TRIGL SERPL-MCNC: 89 MG/DL
TSH SERPL-ACNC: 1.06 UIU/ML
VIT B12 SERPL-MCNC: 488 PG/ML
WBC # FLD AUTO: 6.23 K/UL

## 2023-03-30 LAB
PSA FREE FLD-MCNC: NORMAL %
PSA FREE SERPL-MCNC: <0.01 NG/ML
PSA SERPL-MCNC: 0.11 NG/ML

## 2023-04-04 ENCOUNTER — RX RENEWAL (OUTPATIENT)
Age: 73
End: 2023-04-04

## 2023-07-18 ENCOUNTER — RX RENEWAL (OUTPATIENT)
Age: 73
End: 2023-07-18

## 2023-10-03 ENCOUNTER — APPOINTMENT (OUTPATIENT)
Dept: HEART AND VASCULAR | Facility: CLINIC | Age: 73
End: 2023-10-03
Payer: MEDICARE

## 2023-10-03 PROCEDURE — 36415 COLL VENOUS BLD VENIPUNCTURE: CPT

## 2023-10-10 ENCOUNTER — NON-APPOINTMENT (OUTPATIENT)
Age: 73
End: 2023-10-10

## 2023-10-10 ENCOUNTER — APPOINTMENT (OUTPATIENT)
Dept: HEART AND VASCULAR | Facility: CLINIC | Age: 73
End: 2023-10-10
Payer: MEDICARE

## 2023-10-10 VITALS
BODY MASS INDEX: 27.89 KG/M2 | RESPIRATION RATE: 14 BRPM | WEIGHT: 184 LBS | HEIGHT: 68 IN | HEART RATE: 69 BPM | SYSTOLIC BLOOD PRESSURE: 130 MMHG | DIASTOLIC BLOOD PRESSURE: 80 MMHG

## 2023-10-10 DIAGNOSIS — I65.23 OCCLUSION AND STENOSIS OF BILATERAL CAROTID ARTERIES: ICD-10-CM

## 2023-10-10 DIAGNOSIS — G47.00 INSOMNIA, UNSPECIFIED: ICD-10-CM

## 2023-10-10 LAB
25(OH)D3 SERPL-MCNC: 24.7 NG/ML
ALBUMIN SERPL ELPH-MCNC: 4.4 G/DL
ALP BLD-CCNC: 78 U/L
ALT SERPL-CCNC: 11 U/L
ANION GAP SERPL CALC-SCNC: 13 MMOL/L
APPEARANCE: CLEAR
AST SERPL-CCNC: 10 U/L
BACTERIA UR CULT: NORMAL
BACTERIA: NEGATIVE /HPF
BASOPHILS # BLD AUTO: 0.06 K/UL
BASOPHILS NFR BLD AUTO: 0.8 %
BILIRUB SERPL-MCNC: 0.7 MG/DL
BILIRUBIN URINE: NEGATIVE
BLOOD URINE: NEGATIVE
BUN SERPL-MCNC: 16 MG/DL
CALCIUM SERPL-MCNC: 9.7 MG/DL
CAST: 0 /LPF
CHLORIDE SERPL-SCNC: 104 MMOL/L
CHOLEST SERPL-MCNC: 128 MG/DL
CO2 SERPL-SCNC: 23 MMOL/L
COLOR: YELLOW
CREAT SERPL-MCNC: 0.9 MG/DL
EGFR: 90 ML/MIN/1.73M2
EOSINOPHIL # BLD AUTO: 0.28 K/UL
EOSINOPHIL NFR BLD AUTO: 3.8 %
EPITHELIAL CELLS: 1 /HPF
ESTIMATED AVERAGE GLUCOSE: 120 MG/DL
FOLATE SERPL-MCNC: 15.5 NG/ML
GLUCOSE QUALITATIVE U: NEGATIVE MG/DL
GLUCOSE SERPL-MCNC: 103 MG/DL
HBA1C MFR BLD HPLC: 5.8 %
HCT VFR BLD CALC: 45.3 %
HDLC SERPL-MCNC: 45 MG/DL
HGB BLD-MCNC: 14.2 G/DL
IMM GRANULOCYTES NFR BLD AUTO: 0.3 %
KETONES URINE: NEGATIVE MG/DL
LDLC SERPL CALC-MCNC: 69 MG/DL
LEUKOCYTE ESTERASE URINE: NEGATIVE
LYMPHOCYTES # BLD AUTO: 2.14 K/UL
LYMPHOCYTES NFR BLD AUTO: 29.3 %
MAN DIFF?: NORMAL
MCHC RBC-ENTMCNC: 30.9 PG
MCHC RBC-ENTMCNC: 31.3 GM/DL
MCV RBC AUTO: 98.5 FL
MICROSCOPIC-UA: NORMAL
MONOCYTES # BLD AUTO: 0.56 K/UL
MONOCYTES NFR BLD AUTO: 7.7 %
NEUTROPHILS # BLD AUTO: 4.24 K/UL
NEUTROPHILS NFR BLD AUTO: 58.1 %
NITRITE URINE: NEGATIVE
NONHDLC SERPL-MCNC: 83 MG/DL
PH URINE: 6
PLATELET # BLD AUTO: 165 K/UL
POTASSIUM SERPL-SCNC: 4.6 MMOL/L
PROT SERPL-MCNC: 7.1 G/DL
PROTEIN URINE: NEGATIVE MG/DL
PSA FREE FLD-MCNC: 14 %
PSA FREE SERPL-MCNC: 0.02 NG/ML
PSA SERPL-MCNC: 0.16 NG/ML
RBC # BLD: 4.6 M/UL
RBC # FLD: 14.3 %
RED BLOOD CELLS URINE: 1 /HPF
SODIUM SERPL-SCNC: 140 MMOL/L
SPECIFIC GRAVITY URINE: 1.02
T3 SERPL-MCNC: 114 NG/DL
T3FREE SERPL-MCNC: 2.97 PG/ML
T4 FREE SERPL-MCNC: 1.4 NG/DL
T4 SERPL-MCNC: 7.2 UG/DL
TRIGL SERPL-MCNC: 67 MG/DL
TSH SERPL-ACNC: 1.49 UIU/ML
UROBILINOGEN URINE: 0.2 MG/DL
VIT B12 SERPL-MCNC: 462 PG/ML
WBC # FLD AUTO: 7.3 K/UL
WHITE BLOOD CELLS URINE: 0 /HPF

## 2023-10-10 PROCEDURE — 99214 OFFICE O/P EST MOD 30 MIN: CPT | Mod: 25

## 2023-10-10 PROCEDURE — 93000 ELECTROCARDIOGRAM COMPLETE: CPT

## 2023-10-10 PROCEDURE — 93306 TTE W/DOPPLER COMPLETE: CPT

## 2023-10-10 PROCEDURE — 93880 EXTRACRANIAL BILAT STUDY: CPT

## 2023-10-10 RX ORDER — ALPRAZOLAM 0.5 MG/1
0.5 TABLET ORAL
Qty: 60 | Refills: 0 | Status: DISCONTINUED | COMMUNITY
Start: 2018-07-31 | End: 2023-10-10

## 2023-11-02 ENCOUNTER — RX RENEWAL (OUTPATIENT)
Age: 73
End: 2023-11-02

## 2023-11-17 ENCOUNTER — RX RENEWAL (OUTPATIENT)
Age: 73
End: 2023-11-17

## 2023-12-11 NOTE — PATIENT PROFILE ADULT. - AS SC BRADEN MOISTURE
Airway    Performed by: Donta Padilla MD  Authorized by: Donta Padilla MD    Final Airway Type:  Endotracheal airway  Final Endotracheal Airway*:  ETT  ETT Size (mm)*:  7.0  Cuff*:  Regular  Technique Used for Successful ETT Placement:  Direct laryngoscopy  Devices/Methods Used in Placement*:  Mask  Intubation Procedure*:  Preoxygenation, ETCO2, Atraumatic, Dentition Unchanged and Pharynx Clear  Insertion Site:  Oral  Blade Type*:  MAC  Blade Size*:  3  Placement Verified by: auscultation and capnometry    Glottic View*:  1 - full view of glottis  Attempts*:  1  Location:  OR  Urgency:  Elective  Difficult Airway: No    Indications for Airway Management:  Anesthesia  Mask Difficulty Assessment:  1 - vent by mask  Start Time: 12/11/2023 10:47 AM
(4) rarely moist

## 2024-01-02 ENCOUNTER — APPOINTMENT (OUTPATIENT)
Dept: VASCULAR SURGERY | Facility: CLINIC | Age: 74
End: 2024-01-02
Payer: MEDICARE

## 2024-01-02 VITALS
HEART RATE: 102 BPM | DIASTOLIC BLOOD PRESSURE: 72 MMHG | WEIGHT: 184 LBS | SYSTOLIC BLOOD PRESSURE: 134 MMHG | HEIGHT: 68 IN | BODY MASS INDEX: 27.89 KG/M2

## 2024-01-02 VITALS
HEIGHT: 68 IN | BODY MASS INDEX: 27.89 KG/M2 | SYSTOLIC BLOOD PRESSURE: 116 MMHG | WEIGHT: 184 LBS | DIASTOLIC BLOOD PRESSURE: 67 MMHG | HEART RATE: 75 BPM

## 2024-01-02 PROCEDURE — 99213 OFFICE O/P EST LOW 20 MIN: CPT

## 2024-01-02 PROCEDURE — 93925 LOWER EXTREMITY STUDY: CPT

## 2024-01-02 NOTE — HISTORY OF PRESENT ILLNESS
[FreeTextEntry1] : 73oM s/p EVAR/AAA and s/p bl FEA in November 2020 for persistent b/l LE claudication, returns for surveillance of his AAA and runoff.  Currently, pt reports persistent LE claudication that occurs both legs simultaneously at 1 block ambulation and requires him to stop and rest.  Pt reports fatigue in the thighs and calves, still reports persistent low back pain that improves when he bends at the waist to ambulate.  He denies rest pain in the feet and legs.

## 2024-01-02 NOTE — PHYSICAL EXAM
[JVD] : no jugular venous distention  [Varicose Veins Of Lower Extremities] : not present [] : not present [de-identified] : Well-nourished, NAD [de-identified] : NC/AT, anicteric [de-identified] : SNTND [de-identified] : +mild scrotal edema, non-tender [de-identified] : FROM throughout, strength 5/5x4, no palpable cords [de-identified] : Well-healing b/l groin incisions

## 2024-01-02 NOTE — PROCEDURE
[FreeTextEntry1] : Duplex today demonstrates calcification of both femoral arteries, no associated stenosis, dSFA/popliteal artery collaterals noted in the RLE suggestive of dSFA occlusion.

## 2024-01-02 NOTE — ADDENDUM
[FreeTextEntry1] : This note was written by Josep Mittal, acting as a scribe for Dr. Jayshree Hdez.  I, Dr. Jayshree Hdez, have read and attest that all the information, medical decision-making, and discharge instructions within are true and accurate.  I, Dr. Jayshree Hdez, personally performed the evaluation and management (E/M) services for this established patient who presents today with (an) existing condition(s).  That E/M includes conducting the examination, assessing all conditions, and (re)establishing/reinforcing a plan of care.  Today, my ACP, Josep Mittal, was here to observe my evaluation and management services for this condition to be followed going forward.

## 2024-01-02 NOTE — ASSESSMENT
[FreeTextEntry1] : 73oM s/p EVAR/AAA and s/p bl FEA in November 2020 for persistent b/l LE claudication, returns for surveillance of his AAA and runoff.  Currently, pt reports persistent LE claudication that occurs both legs simultaneously at 1 block ambulation and requires him to stop and rest.  Pt reports fatigue in the thighs and calves, still reports persistent low back pain that improves when he bends at the waist to ambulate.  Normal abdominal exam noted today and duplex today demonstrates widely patent ilio-femoral arteries, calcification in both LEs w/o stenosis, but collaterals noted at the dSFA/pop a. suggestive of dSFA occlusion.  Reassured pt that while there is evidence of arterial disease in both legs, his pain is likely multifactorial and primarily due to his lumbar pain/inflammation.  Recommend he continue exercising regularly but consider ortho-spine evaluation and/or PT.  He will RTO in 6mos for surveillance of his endograft/CFEAs.

## 2024-01-11 NOTE — PROCEDURE NOTE - NSCOMPLICATION_GEN_A_CORE
CALLED TO SCHEDULE FROM REFERRAL AND HAD TO [facility].  
no complications
no complications

## 2024-03-19 ENCOUNTER — RX RENEWAL (OUTPATIENT)
Age: 74
End: 2024-03-19

## 2024-04-02 ENCOUNTER — APPOINTMENT (OUTPATIENT)
Dept: HEART AND VASCULAR | Facility: CLINIC | Age: 74
End: 2024-04-02
Payer: MEDICARE

## 2024-04-02 DIAGNOSIS — Z00.00 ENCOUNTER FOR GENERAL ADULT MEDICAL EXAMINATION W/OUT ABNORMAL FINDINGS: ICD-10-CM

## 2024-04-02 PROCEDURE — 36415 COLL VENOUS BLD VENIPUNCTURE: CPT

## 2024-04-03 LAB
25(OH)D3 SERPL-MCNC: 33.8 NG/ML
ALBUMIN SERPL ELPH-MCNC: 4.4 G/DL
ALP BLD-CCNC: 75 U/L
ALT SERPL-CCNC: 10 U/L
ANION GAP SERPL CALC-SCNC: 14 MMOL/L
AST SERPL-CCNC: 13 U/L
BASOPHILS # BLD AUTO: 0.07 K/UL
BASOPHILS NFR BLD AUTO: 1 %
BILIRUB SERPL-MCNC: 0.6 MG/DL
BUN SERPL-MCNC: 16 MG/DL
CALCIUM SERPL-MCNC: 9.5 MG/DL
CHLORIDE SERPL-SCNC: 103 MMOL/L
CHOLEST SERPL-MCNC: 141 MG/DL
CO2 SERPL-SCNC: 23 MMOL/L
CREAT SERPL-MCNC: 0.93 MG/DL
EGFR: 86 ML/MIN/1.73M2
EOSINOPHIL # BLD AUTO: 0.37 K/UL
EOSINOPHIL NFR BLD AUTO: 5.3 %
ESTIMATED AVERAGE GLUCOSE: 114 MG/DL
FOLATE SERPL-MCNC: 11 NG/ML
GLUCOSE SERPL-MCNC: 104 MG/DL
HBA1C MFR BLD HPLC: 5.6 %
HCT VFR BLD CALC: 45.7 %
HDLC SERPL-MCNC: 50 MG/DL
HGB BLD-MCNC: 14.8 G/DL
IMM GRANULOCYTES NFR BLD AUTO: 0.1 %
LDLC SERPL CALC-MCNC: 75 MG/DL
LYMPHOCYTES # BLD AUTO: 2.33 K/UL
LYMPHOCYTES NFR BLD AUTO: 33.6 %
MAN DIFF?: NORMAL
MCHC RBC-ENTMCNC: 30.6 PG
MCHC RBC-ENTMCNC: 32.4 GM/DL
MCV RBC AUTO: 94.6 FL
MONOCYTES # BLD AUTO: 0.57 K/UL
MONOCYTES NFR BLD AUTO: 8.2 %
NEUTROPHILS # BLD AUTO: 3.59 K/UL
NEUTROPHILS NFR BLD AUTO: 51.8 %
NONHDLC SERPL-MCNC: 91 MG/DL
PLATELET # BLD AUTO: 158 K/UL
POTASSIUM SERPL-SCNC: 4.8 MMOL/L
PROT SERPL-MCNC: 7.2 G/DL
PSA FREE FLD-MCNC: NORMAL %
PSA FREE SERPL-MCNC: <0.01 NG/ML
PSA SERPL-MCNC: 0.11 NG/ML
RBC # BLD: 4.83 M/UL
RBC # FLD: 13.5 %
SODIUM SERPL-SCNC: 140 MMOL/L
T3 SERPL-MCNC: 129 NG/DL
T3FREE SERPL-MCNC: 3.29 PG/ML
T4 FREE SERPL-MCNC: 1.2 NG/DL
T4 SERPL-MCNC: 8 UG/DL
TRIGL SERPL-MCNC: 84 MG/DL
TSH SERPL-ACNC: 2.13 UIU/ML
VIT B12 SERPL-MCNC: 444 PG/ML
WBC # FLD AUTO: 6.94 K/UL

## 2024-04-08 ENCOUNTER — RX RENEWAL (OUTPATIENT)
Age: 74
End: 2024-04-08

## 2024-04-09 ENCOUNTER — APPOINTMENT (OUTPATIENT)
Dept: HEART AND VASCULAR | Facility: CLINIC | Age: 74
End: 2024-04-09
Payer: MEDICARE

## 2024-04-09 ENCOUNTER — NON-APPOINTMENT (OUTPATIENT)
Age: 74
End: 2024-04-09

## 2024-04-09 VITALS
HEIGHT: 68 IN | DIASTOLIC BLOOD PRESSURE: 70 MMHG | HEART RATE: 89 BPM | BODY MASS INDEX: 28.95 KG/M2 | WEIGHT: 191 LBS | SYSTOLIC BLOOD PRESSURE: 130 MMHG

## 2024-04-09 DIAGNOSIS — I10 ESSENTIAL (PRIMARY) HYPERTENSION: ICD-10-CM

## 2024-04-09 DIAGNOSIS — R73.03 PREDIABETES.: ICD-10-CM

## 2024-04-09 DIAGNOSIS — I25.10 ATHEROSCLEROTIC HEART DISEASE OF NATIVE CORONARY ARTERY W/OUT ANGINA PECTORIS: ICD-10-CM

## 2024-04-09 DIAGNOSIS — Z95.1 PRESENCE OF AORTOCORONARY BYPASS GRAFT: ICD-10-CM

## 2024-04-09 DIAGNOSIS — R01.1 CARDIAC MURMUR, UNSPECIFIED: ICD-10-CM

## 2024-04-09 PROCEDURE — G2211 COMPLEX E/M VISIT ADD ON: CPT

## 2024-04-09 PROCEDURE — 93000 ELECTROCARDIOGRAM COMPLETE: CPT

## 2024-04-09 PROCEDURE — 99214 OFFICE O/P EST MOD 30 MIN: CPT

## 2024-04-09 RX ORDER — TAMSULOSIN HYDROCHLORIDE 0.4 MG/1
0.4 CAPSULE ORAL
Qty: 90 | Refills: 3 | Status: DISCONTINUED | COMMUNITY
Start: 2019-09-09 | End: 2024-04-09

## 2024-04-09 RX ORDER — AMLODIPINE BESYLATE 5 MG/1
5 TABLET ORAL
Qty: 90 | Refills: 3 | Status: ACTIVE | COMMUNITY
Start: 2020-05-11 | End: 1900-01-01

## 2024-04-09 RX ORDER — IRBESARTAN 300 MG/1
300 TABLET ORAL
Qty: 90 | Refills: 3 | Status: ACTIVE | COMMUNITY
Start: 2020-05-11 | End: 1900-01-01

## 2024-04-09 RX ORDER — LORAZEPAM 1 MG/1
1 TABLET ORAL
Qty: 30 | Refills: 0 | Status: ACTIVE | COMMUNITY
Start: 1900-01-01 | End: 1900-01-01

## 2024-04-09 RX ORDER — ALBUTEROL SULFATE 90 UG/1
108 (90 BASE) AEROSOL, METERED RESPIRATORY (INHALATION)
Qty: 1 | Refills: 3 | Status: ACTIVE | COMMUNITY
Start: 2019-09-11 | End: 1900-01-01

## 2024-04-09 RX ORDER — DOCUSATE SODIUM 100 MG/1
100 TABLET ORAL DAILY
Qty: 90 | Refills: 3 | Status: ACTIVE | COMMUNITY
Start: 2020-07-06 | End: 1900-01-01

## 2024-04-09 RX ORDER — DIPHENHYDRAMINE HCL 25 MG/1
25 CAPSULE ORAL
Qty: 30 | Refills: 6 | Status: ACTIVE | COMMUNITY
Start: 2023-10-10 | End: 1900-01-01

## 2024-04-09 RX ORDER — LINACLOTIDE 145 UG/1
145 CAPSULE, GELATIN COATED ORAL
Qty: 90 | Refills: 3 | Status: DISCONTINUED | COMMUNITY
Start: 2020-10-15 | End: 2024-04-09

## 2024-04-09 RX ORDER — CLOTRIMAZOLE AND BETAMETHASONE DIPROPIONATE 10; .5 MG/G; MG/G
1-0.05 CREAM TOPICAL TWICE DAILY
Qty: 2 | Refills: 3 | Status: ACTIVE | COMMUNITY
Start: 2018-07-09 | End: 1900-01-01

## 2024-04-09 RX ORDER — ROSUVASTATIN CALCIUM 20 MG/1
20 TABLET, FILM COATED ORAL
Qty: 90 | Refills: 3 | Status: ACTIVE | COMMUNITY
Start: 2020-07-06 | End: 1900-01-01

## 2024-04-09 NOTE — PHYSICAL EXAM

## 2024-04-09 NOTE — END OF VISIT
[Time Spent: ___ minutes] : I have spent [unfilled] minutes of time on the encounter. [FreeTextEntry3] : All medical record entries made by the Scribe were at my Dr. Hector Agudelo, direction and personally dictated by me on -Apr 9, 2024, 1:20PM- I have reviewed the chart and agree that the record accurately reflects my personal performance of the history, physical exam, assessment and plan. I have also personally directed, reviewed and agreed with the chart.

## 2024-04-09 NOTE — DISCUSSION/SUMMARY
[Coronary Artery Disease] : coronary artery disease [Dietary Modification] : dietary modification [Hypertension] : hypertension [Stable] : stable [None] : There are no changes in medication management [Low Sodium Diet] : low sodium diet [NSAIDs Avoidance] : non-steroidal anti-inflammatory drugs avoidance [Patient] : the patient [de-identified] : S/P CABG. [FreeTextEntry1] : Cardiac murmur - Stable; no further intervention at this time (see last echo). Prediabetes - Blood work reviewed with patient. Maintain a low caloric, low sodium, low cholesterol diet. Dietary counseling given, diet and exercise discussed in detail. Pt. referred to  for episode of hematuria s/p treatment for Prostate Ca

## 2024-04-09 NOTE — HISTORY OF PRESENT ILLNESS
[FreeTextEntry1] : Denies Chest Pain, SOB, Dizziness, Leg edema, Orthopnea, PND, Palpitations, Syncope, KRISHNA, Diaphoresis. Patient denies change in appetite, fatigue, heat or cold intolerance, myalgias, polydipsia, polyphagia, polyuria, tingling, numbness.

## 2024-04-15 ENCOUNTER — APPOINTMENT (OUTPATIENT)
Dept: UROLOGY | Facility: CLINIC | Age: 74
End: 2024-04-15
Payer: MEDICARE

## 2024-04-15 VITALS
DIASTOLIC BLOOD PRESSURE: 75 MMHG | SYSTOLIC BLOOD PRESSURE: 140 MMHG | WEIGHT: 190 LBS | BODY MASS INDEX: 28.79 KG/M2 | HEIGHT: 68 IN

## 2024-04-15 DIAGNOSIS — R31.0 GROSS HEMATURIA: ICD-10-CM

## 2024-04-15 DIAGNOSIS — R39.15 URGENCY OF URINATION: ICD-10-CM

## 2024-04-15 PROCEDURE — 99204 OFFICE O/P NEW MOD 45 MIN: CPT

## 2024-04-15 NOTE — HISTORY OF PRESENT ILLNESS
[FreeTextEntry1] : Language: English Accompanied by: Self Contact info: [phone] Referring Provider/PCP: Magnus   Initial H&P 04/15/2024: Mr. LOMBARDI is a very pleasant 73-year-old gentleman who presents today for initial evaluation of prostate cancer.   Was dx with prostate cancer 10-12y ago.  Does not recall urologist at the time.  [facility], although [facility]'s not a .  Underwent XRT.  Does not recall receiving ADT with the XRT.   Has occ dysuria.  Endorses severe urgency. Denies UUI.  On tamsulosin.  Denies weak stream.  4-5x/night nocturia.  Denies rUTIs.  Had first UTI several months ago, which was adequately treated.    Also endorses gross hematuria. Happened about 2-3 weeks ago, lasted for 1d and resolved on its own.   --------------------------------------------------------------------------------------------------------------------------------------- PMHx: Prostate Cancer, HTN, GERD, CAD, HLD, COPD, PAD PSHx: Spine Surgery x4, PCI with Stent, B LE Stents SHx: 1ppd smoker since 16 y/o, denies x2, owns a bar    All: LES - hives --------------------------------------------------------------------------------------------------------------------------------------- Physical Exam: General: NAD, sitting on exam table comfortably HEENT: NCAT, EOMI Resp: breathing comfortably on RA, b/l symm chest rise Cardiac: RRR Abd: SNTND Back: (-) CVAT b/l MSK: ROC w/ FROM Psych: appropriate affect  --------------------------------------------------------------------------------------------------------------------------------------- Results: PVR 4/15/24: 8cc --------------------------------------------------------------------------------------------------------------------------------------- A/P: 73M with history of prostate cancer, stable PSAs since 2018, bothersome baseline irritative symptoms with normal bladder emptying today (PVR 8cc), and recent new onset gross hematuria.   1. Gross Hematuria We had a long discussion today about the patient's current condition, and all of the potential causes. I explained that gross hematuria may be due to a large number of pathologies, including but not limited to cancer, prostatic enlargement (when the prostate is present), infection/inflammation of any part of the urinary tract, stones, urethral stricture disease, trauma (including iatrogenic), and various bleeding/medical disorders. In order to better understand the cause of bleeding, I explained that the standard gross hematuria workup consists of two parts: First, a CT Urogram must be performed, which I explained is a CT with and without contrast that allows us to see the potential causes of gross heme in the kidneys and ureters. If the patient has kidney problems, contrast allergy, or any other contraindication to CT Contrast (iodinated), we will proceed with MR Urogram. The pt understands that Gadolinium is not iodinated contrast, and therefore any iodine/shellfish/contrast allergy is not a contraindication to gadolinium. If the pt is unable to proceed with MRI, and imaging of the upper tracts is vital, we may proceed with bilateral retrograde pyelograms in the operating room, with a kidney ultrasound prior.   The second part of the workup includes office cystoscopy. Unless contraindicated, cystoscopy is almost always recommended after CT. We discussed that if there is a very obvious surgical issue on CT, and we know OR intervention is anticipated, we can sometimes forego office cystoscopy, as it will already be performed in the OR. Nonetheless, sometimes it is recommended to undergo office cystoscopy prior to OR, as it helps with surgical planning. Risks of cystoscopy were explained in detail, including but not limited to bleeding, infection, urethral stricture formation, and post procedural retention. The patient was notified to call the office immediately, should they experience any concerning symptoms, or proceed straight to the ER.   UA was also ordered today, however pt did not have enough in his bladder for a specimen, and he was unable to stay longer in order to submit specimen.   The pt was instructed to make an appt for an office visit 1 week after CT Scan.   2. Prostate Cancer Continue annual PSA with Dr. Agudelo routine BW for surveillance. Will follow.   3. OAB Discussed that likely due to radiation changes, however will assess for anatomic abnormalities on cystoscopy at f/u. If cysto is negative, will discuss myrbetriq.   Plan: - CTU - Cont annual PSA - Cysto after CTU - RTC after CTU  I spent a total of 47 minutes on face-to-face counseling, coordination of care, review of prior records and clinical documentation.

## 2024-05-26 NOTE — PROCEDURE NOTE - GENERAL PROCEDURE DETAILS
Lumbar hemovac incision site prepped in a sterile fashion. Anchoring suture was first removed. Hemovac catheter was removed in its entirety. Steri strips applied at incision site. No further drainage noted.
Yes - the patient is able to be screened

## 2024-07-11 ENCOUNTER — LABORATORY RESULT (OUTPATIENT)
Age: 74
End: 2024-07-11

## 2024-07-11 ENCOUNTER — NON-APPOINTMENT (OUTPATIENT)
Age: 74
End: 2024-07-11

## 2024-07-11 ENCOUNTER — APPOINTMENT (OUTPATIENT)
Dept: HEART AND VASCULAR | Facility: CLINIC | Age: 74
End: 2024-07-11
Payer: MEDICARE

## 2024-07-11 VITALS
SYSTOLIC BLOOD PRESSURE: 140 MMHG | DIASTOLIC BLOOD PRESSURE: 80 MMHG | WEIGHT: 184 LBS | HEART RATE: 85 BPM | RESPIRATION RATE: 14 BRPM | BODY MASS INDEX: 27.89 KG/M2 | HEIGHT: 68 IN

## 2024-07-11 DIAGNOSIS — H81.10 BENIGN PAROXYSMAL VERTIGO, UNSPECIFIED EAR: ICD-10-CM

## 2024-07-11 DIAGNOSIS — R73.03 PREDIABETES.: ICD-10-CM

## 2024-07-11 DIAGNOSIS — Z95.1 PRESENCE OF AORTOCORONARY BYPASS GRAFT: ICD-10-CM

## 2024-07-11 DIAGNOSIS — I10 ESSENTIAL (PRIMARY) HYPERTENSION: ICD-10-CM

## 2024-07-11 DIAGNOSIS — I25.10 ATHEROSCLEROTIC HEART DISEASE OF NATIVE CORONARY ARTERY W/OUT ANGINA PECTORIS: ICD-10-CM

## 2024-07-11 DIAGNOSIS — R01.1 CARDIAC MURMUR, UNSPECIFIED: ICD-10-CM

## 2024-07-11 PROCEDURE — G2211 COMPLEX E/M VISIT ADD ON: CPT

## 2024-07-11 PROCEDURE — 93000 ELECTROCARDIOGRAM COMPLETE: CPT

## 2024-07-11 PROCEDURE — 36415 COLL VENOUS BLD VENIPUNCTURE: CPT

## 2024-07-11 PROCEDURE — 99214 OFFICE O/P EST MOD 30 MIN: CPT

## 2024-07-11 RX ORDER — RIVASTIGMINE 4.6 MG/24H
4.6 PATCH, EXTENDED RELEASE TRANSDERMAL DAILY
Refills: 0 | Status: ACTIVE | COMMUNITY

## 2024-07-11 RX ORDER — MECLIZINE HYDROCHLORIDE 12.5 MG/1
12.5 TABLET ORAL DAILY
Qty: 30 | Refills: 3 | Status: ACTIVE | COMMUNITY
Start: 2024-07-11 | End: 1900-01-01

## 2024-07-15 LAB
25(OH)D3 SERPL-MCNC: 33.9 NG/ML
ALBUMIN SERPL ELPH-MCNC: 4.5 G/DL
ALP BLD-CCNC: 71 U/L
ALT SERPL-CCNC: 12 U/L
ANION GAP SERPL CALC-SCNC: 12 MMOL/L
AST SERPL-CCNC: 12 U/L
BASOPHILS # BLD AUTO: 0.05 K/UL
BASOPHILS NFR BLD AUTO: 0.8 %
BILIRUB SERPL-MCNC: 1.1 MG/DL
CALCIUM SERPL-MCNC: 9.7 MG/DL
CHLORIDE SERPL-SCNC: 102 MMOL/L
CHOLEST SERPL-MCNC: 120 MG/DL
CO2 SERPL-SCNC: 24 MMOL/L
CREAT SERPL-MCNC: 0.97 MG/DL
EGFR: 82 ML/MIN/1.73M2
EOSINOPHIL NFR BLD AUTO: 1.2 %
ESTIMATED AVERAGE GLUCOSE: 117 MG/DL
FOLATE SERPL-MCNC: 14.7 NG/ML
GLUCOSE SERPL-MCNC: 105 MG/DL
HBA1C MFR BLD HPLC: 5.7 %
HCT VFR BLD CALC: 44.6 %
HGB BLD-MCNC: 14.6 G/DL
LDLC SERPL CALC-MCNC: 60 MG/DL
LYMPHOCYTES # BLD AUTO: 1.62 K/UL
LYMPHOCYTES NFR BLD AUTO: 24.5 %
MAN DIFF?: NORMAL
MCHC RBC-ENTMCNC: 32.7 GM/DL
MCV RBC AUTO: 94.5 FL
MONOCYTES NFR BLD AUTO: 7 %
NEUTROPHILS # BLD AUTO: 4.37 K/UL
NEUTROPHILS NFR BLD AUTO: 66.2 %
NONHDLC SERPL-MCNC: 72 MG/DL
PLATELET # BLD AUTO: 156 K/UL
POTASSIUM SERPL-SCNC: 4.8 MMOL/L
PROT SERPL-MCNC: 7.1 G/DL
PSA FREE FLD-MCNC: NORMAL %
PSA FREE SERPL-MCNC: <0.01 NG/ML
PSA SERPL-MCNC: 0.15 NG/ML
RBC # BLD: 4.72 M/UL
RBC # FLD: 14.7 %
SODIUM SERPL-SCNC: 138 MMOL/L
T3 SERPL-MCNC: 112 NG/DL
T3FREE SERPL-MCNC: 2.98 PG/ML
T4 FREE SERPL-MCNC: 1.5 NG/DL
T4 SERPL-MCNC: 8.5 UG/DL
TRIGL SERPL-MCNC: 52 MG/DL
TSH SERPL-ACNC: 0.84 UIU/ML
VIT B12 SERPL-MCNC: 576 PG/ML
WBC # FLD AUTO: 6.6 K/UL

## 2024-10-24 ENCOUNTER — APPOINTMENT (OUTPATIENT)
Dept: HEART AND VASCULAR | Facility: CLINIC | Age: 74
End: 2024-10-24

## 2024-10-29 ENCOUNTER — LABORATORY RESULT (OUTPATIENT)
Age: 74
End: 2024-10-29

## 2024-10-29 ENCOUNTER — APPOINTMENT (OUTPATIENT)
Dept: HEART AND VASCULAR | Facility: CLINIC | Age: 74
End: 2024-10-29
Payer: MEDICARE

## 2024-10-29 ENCOUNTER — RX RENEWAL (OUTPATIENT)
Age: 74
End: 2024-10-29

## 2024-10-29 ENCOUNTER — NON-APPOINTMENT (OUTPATIENT)
Age: 74
End: 2024-10-29

## 2024-10-29 VITALS
DIASTOLIC BLOOD PRESSURE: 90 MMHG | HEART RATE: 79 BPM | SYSTOLIC BLOOD PRESSURE: 150 MMHG | RESPIRATION RATE: 14 BRPM | WEIGHT: 186 LBS | HEIGHT: 68 IN | BODY MASS INDEX: 28.19 KG/M2

## 2024-10-29 DIAGNOSIS — Z87.39 PERSONAL HISTORY OF OTHER DISEASES OF THE MUSCULOSKELETAL SYSTEM AND CONNECTIVE TISSUE: ICD-10-CM

## 2024-10-29 DIAGNOSIS — R73.03 PREDIABETES.: ICD-10-CM

## 2024-10-29 DIAGNOSIS — I10 ESSENTIAL (PRIMARY) HYPERTENSION: ICD-10-CM

## 2024-10-29 DIAGNOSIS — I65.23 OCCLUSION AND STENOSIS OF BILATERAL CAROTID ARTERIES: ICD-10-CM

## 2024-10-29 DIAGNOSIS — Z95.1 PRESENCE OF AORTOCORONARY BYPASS GRAFT: ICD-10-CM

## 2024-10-29 DIAGNOSIS — I25.10 ATHEROSCLEROTIC HEART DISEASE OF NATIVE CORONARY ARTERY W/OUT ANGINA PECTORIS: ICD-10-CM

## 2024-10-29 PROCEDURE — 36415 COLL VENOUS BLD VENIPUNCTURE: CPT

## 2024-10-29 PROCEDURE — 99214 OFFICE O/P EST MOD 30 MIN: CPT

## 2024-10-29 PROCEDURE — G2211 COMPLEX E/M VISIT ADD ON: CPT

## 2024-10-29 PROCEDURE — 93000 ELECTROCARDIOGRAM COMPLETE: CPT

## 2024-10-29 PROCEDURE — 93306 TTE W/DOPPLER COMPLETE: CPT

## 2024-10-29 PROCEDURE — 93880 EXTRACRANIAL BILAT STUDY: CPT

## 2024-10-31 LAB
25(OH)D3 SERPL-MCNC: 33.1 NG/ML
ALBUMIN SERPL ELPH-MCNC: 4.2 G/DL
ALP BLD-CCNC: 70 U/L
ALT SERPL-CCNC: 8 U/L
ANION GAP SERPL CALC-SCNC: 13 MMOL/L
AST SERPL-CCNC: 11 U/L
BASOPHILS # BLD AUTO: 0.07 K/UL
BASOPHILS NFR BLD AUTO: 1 %
BILIRUB SERPL-MCNC: 1 MG/DL
BUN SERPL-MCNC: 14 MG/DL
CALCIUM SERPL-MCNC: 9.2 MG/DL
CHLORIDE SERPL-SCNC: 104 MMOL/L
CHOLEST SERPL-MCNC: 115 MG/DL
CO2 SERPL-SCNC: 24 MMOL/L
CREAT SERPL-MCNC: 0.9 MG/DL
EGFR: 90 ML/MIN/1.73M2
EOSINOPHIL # BLD AUTO: 0.23 K/UL
EOSINOPHIL NFR BLD AUTO: 3.3 %
ESTIMATED AVERAGE GLUCOSE: 114 MG/DL
FOLATE SERPL-MCNC: 16.8 NG/ML
GLUCOSE SERPL-MCNC: 75 MG/DL
HBA1C MFR BLD HPLC: 5.6 %
HCT VFR BLD CALC: 44.2 %
HDLC SERPL-MCNC: 44 MG/DL
HGB BLD-MCNC: 13.9 G/DL
IMM GRANULOCYTES NFR BLD AUTO: 0.3 %
LDLC SERPL CALC-MCNC: 56 MG/DL
LYMPHOCYTES # BLD AUTO: 1.73 K/UL
LYMPHOCYTES NFR BLD AUTO: 24.5 %
MAN DIFF?: NORMAL
MCHC RBC-ENTMCNC: 31 PG
MCHC RBC-ENTMCNC: 31.4 G/DL
MCV RBC AUTO: 98.7 FL
MONOCYTES # BLD AUTO: 0.49 K/UL
MONOCYTES NFR BLD AUTO: 7 %
NEUTROPHILS # BLD AUTO: 4.51 K/UL
NEUTROPHILS NFR BLD AUTO: 63.9 %
NONHDLC SERPL-MCNC: 72 MG/DL
PLATELET # BLD AUTO: 180 K/UL
POTASSIUM SERPL-SCNC: 4.7 MMOL/L
PROT SERPL-MCNC: 6.6 G/DL
PSA FREE FLD-MCNC: NORMAL %
PSA FREE SERPL-MCNC: <0.01 NG/ML
PSA SERPL-MCNC: 0.11 NG/ML
RBC # BLD: 4.48 M/UL
RBC # FLD: 14.1 %
SODIUM SERPL-SCNC: 141 MMOL/L
T3 SERPL-MCNC: 111 NG/DL
T3FREE SERPL-MCNC: 3.02 PG/ML
T3RU NFR SERPL: 1 TBI
T4 FREE SERPL-MCNC: 1.4 NG/DL
T4 SERPL-MCNC: 9 UG/DL
TRIGL SERPL-MCNC: 80 MG/DL
TSH SERPL-ACNC: 1.29 UIU/ML
VIT B12 SERPL-MCNC: 478 PG/ML
WBC # FLD AUTO: 7.05 K/UL

## 2025-01-22 NOTE — PATIENT PROFILE ADULT. - PROVIDER NOTIFICATION
CC:  Zeb Lawson Nealalexandra is here today with mom, Jana, and dad, Chandler, for Office Visit and Cerumen Impaction (Wax removal/)    Medications: medications verified and updated  Added preferred pharmacy  Denies  known Latex allergy or symptoms of Latex sensitivity.  All allergies and medications reviewed.  Patient would like communication of their results via:      Cell Phone:   Telephone Information:   Mobile [phone]     Okay to leave a message containing results? Yes      Rooming documentation of social history includes tobacco screening only.     Declines

## 2025-03-24 ENCOUNTER — RX RENEWAL (OUTPATIENT)
Age: 75
End: 2025-03-24

## 2025-03-25 ENCOUNTER — RX RENEWAL (OUTPATIENT)
Age: 75
End: 2025-03-25

## 2025-03-25 RX ORDER — DOCUSATE SODIUM 100 MG/1
100 CAPSULE, LIQUID FILLED ORAL
Qty: 90 | Refills: 3 | Status: ACTIVE | COMMUNITY
Start: 2025-03-25 | End: 1900-01-01

## 2025-03-28 NOTE — DISCHARGE NOTE ADULT - CARE PROVIDER_API CALL
Spoke with patient and his daughter in clinic today to discuss INR of 1.5. LDH stable. Patient confirms taking warfarin as instructed, but may have missed 1 dose on Sunday (he is not 100% sure, however). Denies VAD alarms or tea coloured urine. No s/sx of bleeding/bruising. He continues to have trouble eating d/t gum pain/swelling (is seeing oral surgeon today). He drinks Ensure daily (intake stable). Denies eating leafy green vegetables. Discussed with BERNARDO Malone. No bridging for now. Will increase dose to 4mg tonight, 3mg tomorrow, and 4mg on Sunday, with INR/LDH on Monday.    Patient and daughter verbalized understanding of plan.    Patricio Alvarez), Neurological Surgery  [address], [place] [facility]  Phone: [phone]  Fax: [phone]

## 2025-04-10 ENCOUNTER — APPOINTMENT (OUTPATIENT)
Dept: HEART AND VASCULAR | Facility: CLINIC | Age: 75
End: 2025-04-10
Payer: MEDICARE

## 2025-04-10 DIAGNOSIS — Z00.00 ENCOUNTER FOR GENERAL ADULT MEDICAL EXAMINATION W/OUT ABNORMAL FINDINGS: ICD-10-CM

## 2025-04-10 PROCEDURE — 36415 COLL VENOUS BLD VENIPUNCTURE: CPT

## 2025-04-11 LAB
25(OH)D3 SERPL-MCNC: 25.6 NG/ML
ALBUMIN SERPL ELPH-MCNC: 4 G/DL
ALP BLD-CCNC: 79 U/L
ALT SERPL-CCNC: 15 U/L
ANION GAP SERPL CALC-SCNC: 11 MMOL/L
AST SERPL-CCNC: 16 U/L
BASOPHILS # BLD AUTO: 0.04 K/UL
BASOPHILS NFR BLD AUTO: 0.5 %
BILIRUB SERPL-MCNC: 0.6 MG/DL
BUN SERPL-MCNC: 14 MG/DL
CALCIUM SERPL-MCNC: 8.5 MG/DL
CHLORIDE SERPL-SCNC: 101 MMOL/L
CHOLEST SERPL-MCNC: 118 MG/DL
CO2 SERPL-SCNC: 24 MMOL/L
CREAT SERPL-MCNC: 0.97 MG/DL
EGFRCR SERPLBLD CKD-EPI 2021: 81 ML/MIN/1.73M2
EOSINOPHIL # BLD AUTO: 0.15 K/UL
EOSINOPHIL NFR BLD AUTO: 2 %
ESTIMATED AVERAGE GLUCOSE: 114 MG/DL
FOLATE SERPL-MCNC: >20 NG/ML
GLUCOSE SERPL-MCNC: 91 MG/DL
HBA1C MFR BLD HPLC: 5.6 %
HCT VFR BLD CALC: 41.3 %
HDLC SERPL-MCNC: 48 MG/DL
HGB BLD-MCNC: 13.3 G/DL
IMM GRANULOCYTES NFR BLD AUTO: 0.4 %
LDLC SERPL-MCNC: 55 MG/DL
LYMPHOCYTES # BLD AUTO: 1.4 K/UL
LYMPHOCYTES NFR BLD AUTO: 18.5 %
MAN DIFF?: NORMAL
MCHC RBC-ENTMCNC: 30.1 PG
MCHC RBC-ENTMCNC: 32.2 G/DL
MCV RBC AUTO: 93.4 FL
MONOCYTES # BLD AUTO: 1.16 K/UL
MONOCYTES NFR BLD AUTO: 15.4 %
NEUTROPHILS # BLD AUTO: 4.77 K/UL
NEUTROPHILS NFR BLD AUTO: 63.2 %
NONHDLC SERPL-MCNC: 70 MG/DL
PLATELET # BLD AUTO: 156 K/UL
POTASSIUM SERPL-SCNC: 4.3 MMOL/L
PROT SERPL-MCNC: 6.4 G/DL
PSA FREE FLD-MCNC: NORMAL %
PSA FREE SERPL-MCNC: <0.01 NG/ML
PSA SERPL-MCNC: 0.11 NG/ML
RBC # BLD: 4.42 M/UL
RBC # FLD: 15.2 %
SODIUM SERPL-SCNC: 137 MMOL/L
T3 SERPL-MCNC: 99 NG/DL
T3FREE SERPL-MCNC: 2.4 PG/ML
T4 FREE SERPL-MCNC: 1.2 NG/DL
T4 SERPL-MCNC: 7.1 UG/DL
TRIGL SERPL-MCNC: 76 MG/DL
TSH SERPL-ACNC: 1.24 UIU/ML
VIT B12 SERPL-MCNC: 654 PG/ML
WBC # FLD AUTO: 7.55 K/UL

## 2025-04-16 ENCOUNTER — APPOINTMENT (OUTPATIENT)
Dept: HEART AND VASCULAR | Facility: CLINIC | Age: 75
End: 2025-04-16
Payer: MEDICARE

## 2025-04-16 ENCOUNTER — NON-APPOINTMENT (OUTPATIENT)
Age: 75
End: 2025-04-16

## 2025-04-16 VITALS
RESPIRATION RATE: 14 BRPM | HEART RATE: 80 BPM | SYSTOLIC BLOOD PRESSURE: 160 MMHG | BODY MASS INDEX: 28.64 KG/M2 | HEIGHT: 68 IN | DIASTOLIC BLOOD PRESSURE: 95 MMHG | WEIGHT: 189 LBS

## 2025-04-16 DIAGNOSIS — I34.0 NONRHEUMATIC MITRAL (VALVE) INSUFFICIENCY: ICD-10-CM

## 2025-04-16 DIAGNOSIS — R73.03 PREDIABETES.: ICD-10-CM

## 2025-04-16 DIAGNOSIS — I10 ESSENTIAL (PRIMARY) HYPERTENSION: ICD-10-CM

## 2025-04-16 DIAGNOSIS — Z95.1 PRESENCE OF AORTOCORONARY BYPASS GRAFT: ICD-10-CM

## 2025-04-16 DIAGNOSIS — I25.10 ATHEROSCLEROTIC HEART DISEASE OF NATIVE CORONARY ARTERY W/OUT ANGINA PECTORIS: ICD-10-CM

## 2025-04-16 PROCEDURE — 99214 OFFICE O/P EST MOD 30 MIN: CPT

## 2025-04-16 PROCEDURE — 93000 ELECTROCARDIOGRAM COMPLETE: CPT

## 2025-04-16 PROCEDURE — G2211 COMPLEX E/M VISIT ADD ON: CPT

## 2025-04-16 RX ORDER — IRBESARTAN 300 MG/1
300 TABLET ORAL DAILY
Refills: 0 | Status: ACTIVE | COMMUNITY

## 2025-04-16 RX ORDER — CLOPIDOGREL BISULFATE 75 MG/1
75 TABLET, FILM COATED ORAL
Qty: 90 | Refills: 3 | Status: ACTIVE | COMMUNITY

## 2025-04-16 RX ORDER — AMLODIPINE BESYLATE 5 MG/1
5 TABLET ORAL
Qty: 90 | Refills: 1 | Status: ACTIVE | COMMUNITY

## 2025-04-16 RX ORDER — CILOSTAZOL 100 MG/1
100 TABLET ORAL TWICE DAILY
Refills: 0 | Status: ACTIVE | COMMUNITY

## 2025-04-16 RX ORDER — ROSUVASTATIN CALCIUM 20 MG/1
20 TABLET, FILM COATED ORAL DAILY
Refills: 0 | Status: ACTIVE | COMMUNITY

## 2025-04-16 RX ORDER — PIMAVANSERIN TARTRATE 34 MG/1
34 CAPSULE ORAL DAILY
Refills: 0 | Status: ACTIVE | COMMUNITY

## 2025-04-16 RX ORDER — ESOMEPRAZOLE MAGNESIUM 40 MG/1
40 CAPSULE, DELAYED RELEASE ORAL
Qty: 90 | Refills: 3 | Status: ACTIVE | COMMUNITY

## 2025-04-16 RX ORDER — TAMSULOSIN HYDROCHLORIDE 0.4 MG/1
0.4 CAPSULE ORAL
Qty: 90 | Refills: 3 | Status: ACTIVE | COMMUNITY

## 2025-04-16 RX ORDER — DULOXETINE HYDROCHLORIDE 30 MG/1
30 CAPSULE, DELAYED RELEASE ORAL TWICE DAILY
Refills: 0 | Status: ACTIVE | COMMUNITY

## 2025-05-20 ENCOUNTER — APPOINTMENT (OUTPATIENT)
Dept: UROLOGY | Facility: CLINIC | Age: 75
End: 2025-05-20
Payer: MEDICARE

## 2025-05-20 VITALS
SYSTOLIC BLOOD PRESSURE: 168 MMHG | WEIGHT: 181 LBS | DIASTOLIC BLOOD PRESSURE: 72 MMHG | BODY MASS INDEX: 27.43 KG/M2 | HEIGHT: 68 IN

## 2025-05-20 DIAGNOSIS — R31.0 GROSS HEMATURIA: ICD-10-CM

## 2025-05-20 PROCEDURE — G2211 COMPLEX E/M VISIT ADD ON: CPT

## 2025-05-20 PROCEDURE — 99214 OFFICE O/P EST MOD 30 MIN: CPT

## 2025-06-03 NOTE — PATIENT PROFILE ADULT. - FUNCTIONAL SCREEN CURRENT LEVEL: TRANSFERRING, MLM
PGY-1 Progress Note discussed with attending      PLEASE CONTACT ON CALL TEAM:  - On Call Team (Please refer to Alissa) FROM 5:00 PM - 8:30PM  - Nightfloat Team FROM 8:30 -7:30 AM    INTERVAL HPI/OVERNIGHT EVENTS:     No acute overnight events. Pt evaluated at bedside. Pt has no new complaints.      MEDICATIONS  (STANDING):  ascorbic acid 500 milliGRAM(s) Oral daily  aspirin  chewable 81 milliGRAM(s) Oral daily  atorvastatin 10 milliGRAM(s) Oral at bedtime  ferrous    sulfate 325 milliGRAM(s) Oral daily  heparin   Injectable 5000 Unit(s) SubCutaneous every 12 hours  lactated ringers. 1000 milliLiter(s) (65 mL/Hr) IV Continuous <Continuous>  levothyroxine 50 MICROGram(s) Oral daily  multivitamin 1 Tablet(s) Oral daily  pantoprazole    Tablet 40 milliGRAM(s) Oral before breakfast  potassium chloride  10 mEq/100 mL IVPB 10 milliEquivalent(s) IV Intermittent every 1 hour  senna 2 Tablet(s) Oral at bedtime    MEDICATIONS  (PRN):      Vital Signs Last 24 Hrs  T(C): 36.8 (03 Jun 2025 05:03), Max: 38.1 (02 Jun 2025 11:46)  T(F): 98.3 (03 Jun 2025 05:03), Max: 100.5 (02 Jun 2025 11:46)  HR: 102 (03 Jun 2025 05:03) (102 - 115)  BP: 147/83 (03 Jun 2025 05:03) (147/83 - 163/96)  BP(mean): 112 (02 Jun 2025 18:15) (112 - 112)  RR: 18 (03 Jun 2025 05:03) (17 - 19)  SpO2: 94% (03 Jun 2025 05:03) (93% - 99%)    Parameters below as of 03 Jun 2025 05:03  Patient On (Oxygen Delivery Method): room air        PHYSICAL EXAMINATION:  GENERAL: NAD  HEAD:  Atraumatic, Normocephalic  EYES:  conjunctiva and sclera clear  NECK: Supple  CHEST/LUNG: Clear to auscultation  HEART: Regular rate and rhythm; No murmurs, rubs, or gallops  ABDOMEN: Soft, Nontender, Bowel sounds present, no masses on palpation  NERVOUS SYSTEM:  Alert and oriented  EXTREMITIES:  No BLE edema  SKIN: warm, dry                          12.6   13.35 )-----------( 309      ( 03 Jun 2025 05:53 )             37.0     06-03    133[L]  |  96  |  19[H]  ----------------------------<  96  3.4[L]   |  30  |  0.93    Ca    8.8      03 Jun 2025 05:53  Phos  3.1     06-03  Mg     1.7     06-03    TPro  6.5  /  Alb  3.4[L]  /  TBili  1.0  /  DBili  x   /  AST  27  /  ALT  31  /  AlkPhos  64  06-03    LIVER FUNCTIONS - ( 03 Jun 2025 05:53 )  Alb: 3.4 g/dL / Pro: 6.5 g/dL / ALK PHOS: 64 U/L / ALT: 31 U/L DA / AST: 27 U/L / GGT: x               PT/INR - ( 02 Jun 2025 10:00 )   PT: 10.7 sec;   INR: 0.91 ratio         PTT - ( 02 Jun 2025 10:00 )  PTT:31.2 sec    I&O's Summary    02 Jun 2025 07:01  -  03 Jun 2025 07:00  --------------------------------------------------------  IN: 0 mL / OUT: 600 mL / NET: -600 mL          Urinalysis with Rflx Culture (collected 02 Jun 2025 16:08)     PGY-1 Progress Note discussed with attending      PLEASE CONTACT ON CALL TEAM:  - On Call Team (Please refer to Alissa) FROM 5:00 PM - 8:30PM  - Nightfloat Team FROM 8:30 -7:30 AM    INTERVAL HPI/OVERNIGHT EVENTS:     No acute overnight events. Pt evaluated at bedside. Pt has no new complaints      MEDICATIONS  (STANDING):  ascorbic acid 500 milliGRAM(s) Oral daily  aspirin  chewable 81 milliGRAM(s) Oral daily  atorvastatin 10 milliGRAM(s) Oral at bedtime  ferrous    sulfate 325 milliGRAM(s) Oral daily  heparin   Injectable 5000 Unit(s) SubCutaneous every 12 hours  lactated ringers. 1000 milliLiter(s) (65 mL/Hr) IV Continuous <Continuous>  levothyroxine 50 MICROGram(s) Oral daily  multivitamin 1 Tablet(s) Oral daily  pantoprazole    Tablet 40 milliGRAM(s) Oral before breakfast  potassium chloride  10 mEq/100 mL IVPB 10 milliEquivalent(s) IV Intermittent every 1 hour  senna 2 Tablet(s) Oral at bedtime    MEDICATIONS  (PRN):      Vital Signs Last 24 Hrs  T(C): 36.8 (03 Jun 2025 05:03), Max: 38.1 (02 Jun 2025 11:46)  T(F): 98.3 (03 Jun 2025 05:03), Max: 100.5 (02 Jun 2025 11:46)  HR: 102 (03 Jun 2025 05:03) (102 - 115)  BP: 147/83 (03 Jun 2025 05:03) (147/83 - 163/96)  BP(mean): 112 (02 Jun 2025 18:15) (112 - 112)  RR: 18 (03 Jun 2025 05:03) (17 - 19)  SpO2: 94% (03 Jun 2025 05:03) (93% - 99%)    Parameters below as of 03 Jun 2025 05:03  Patient On (Oxygen Delivery Method): room air        PHYSICAL EXAMINATION:  GENERAL: NAD  HEAD:  Atraumatic, Normocephalic  EYES:  conjunctiva and sclera clear  NECK: Supple  CHEST/LUNG: Clear to auscultation  HEART: Regular rate and rhythm; No murmurs, rubs, or gallops  ABDOMEN: Soft, Nontender, Bowel sounds present, no masses on palpation  NERVOUS SYSTEM:  Alert and oriented x 1-2  EXTREMITIES:  No BLE edema  SKIN: warm, dry                          12.6   13.35 )-----------( 309      ( 03 Jun 2025 05:53 )             37.0     06-03    133[L]  |  96  |  19[H]  ----------------------------<  96  3.4[L]   |  30  |  0.93    Ca    8.8      03 Jun 2025 05:53  Phos  3.1     06-03  Mg     1.7     06-03    TPro  6.5  /  Alb  3.4[L]  /  TBili  1.0  /  DBili  x   /  AST  27  /  ALT  31  /  AlkPhos  64  06-03    LIVER FUNCTIONS - ( 03 Jun 2025 05:53 )  Alb: 3.4 g/dL / Pro: 6.5 g/dL / ALK PHOS: 64 U/L / ALT: 31 U/L DA / AST: 27 U/L / GGT: x               PT/INR - ( 02 Jun 2025 10:00 )   PT: 10.7 sec;   INR: 0.91 ratio         PTT - ( 02 Jun 2025 10:00 )  PTT:31.2 sec    I&O's Summary    02 Jun 2025 07:01  -  03 Jun 2025 07:00  --------------------------------------------------------  IN: 0 mL / OUT: 600 mL / NET: -600 mL          Urinalysis with Rflx Culture (collected 02 Jun 2025 16:08)     (0) independent

## 2025-06-20 ENCOUNTER — RX RENEWAL (OUTPATIENT)
Age: 75
End: 2025-06-20

## 2025-06-24 ENCOUNTER — APPOINTMENT (OUTPATIENT)
Dept: UROLOGY | Facility: CLINIC | Age: 75
End: 2025-06-24

## 2025-06-24 VITALS
OXYGEN SATURATION: 97 % | DIASTOLIC BLOOD PRESSURE: 80 MMHG | BODY MASS INDEX: 27.28 KG/M2 | HEART RATE: 75 BPM | SYSTOLIC BLOOD PRESSURE: 165 MMHG | TEMPERATURE: 97.5 F | WEIGHT: 180 LBS | RESPIRATION RATE: 15 BRPM | HEIGHT: 68 IN

## 2025-06-24 PROCEDURE — 99215 OFFICE O/P EST HI 40 MIN: CPT | Mod: 25

## 2025-06-24 PROCEDURE — 52000 CYSTOURETHROSCOPY: CPT

## 2025-06-26 ENCOUNTER — NON-APPOINTMENT (OUTPATIENT)
Age: 75
End: 2025-06-26

## 2025-06-28 NOTE — PRE-OP CHECKLIST - IDENTIFICATION BAND VERIFIED
no blurred vision/no change in level of consciousness/no confusion/no dizziness/no fever/no loss of consciousness/no nausea/no numbness/no vomiting/no weakness done

## 2025-07-06 PROBLEM — D49.4 BLADDER TUMOR: Status: ACTIVE | Noted: 2025-07-06

## 2025-07-18 VITALS
SYSTOLIC BLOOD PRESSURE: 156 MMHG | DIASTOLIC BLOOD PRESSURE: 76 MMHG | TEMPERATURE: 98 F | OXYGEN SATURATION: 98 % | RESPIRATION RATE: 17 BRPM | HEART RATE: 102 BPM | WEIGHT: 184.31 LBS | HEIGHT: 68 IN

## 2025-07-18 PROBLEM — N39.0 UTI (URINARY TRACT INFECTION), UNCOMPLICATED: Status: ACTIVE | Noted: 2021-08-09

## 2025-07-18 PROBLEM — B19.20 UNSPECIFIED VIRAL HEPATITIS C WITHOUT HEPATIC COMA: Chronic | Status: INACTIVE | Noted: 2020-10-06 | Resolved: 2025-07-18

## 2025-07-18 RX ORDER — NITROFURANTOIN MACROCRYSTALS 100 MG/1
100 CAPSULE ORAL
Qty: 10 | Refills: 0 | Status: ACTIVE | COMMUNITY
Start: 2025-07-18 | End: 1900-01-01

## 2025-07-21 ENCOUNTER — APPOINTMENT (OUTPATIENT)
Dept: UROLOGY | Facility: HOSPITAL | Age: 75
End: 2025-07-21

## 2025-07-21 ENCOUNTER — TRANSCRIPTION ENCOUNTER (OUTPATIENT)
Age: 75
End: 2025-07-21

## 2025-08-01 RX ORDER — CLOPIDOGREL BISULFATE 75 MG/1
1 TABLET, FILM COATED ORAL
Refills: 0 | DISCHARGE

## 2025-08-01 RX ORDER — IRBESARTAN 75 MG/1
40 TABLET ORAL
Refills: 0 | DISCHARGE

## 2025-08-02 PROBLEM — R73.03 PREDIABETES: Chronic | Status: INACTIVE | Noted: 2020-10-06 | Resolved: 2025-08-01

## 2025-08-02 PROBLEM — I73.9 PERIPHERAL VASCULAR DISEASE, UNSPECIFIED: Chronic | Status: INACTIVE | Noted: 2018-05-15 | Resolved: 2025-08-01

## 2025-08-02 PROBLEM — G06.2 EXTRADURAL AND SUBDURAL ABSCESS, UNSPECIFIED: Chronic | Status: INACTIVE | Noted: 2020-10-06 | Resolved: 2025-08-01

## 2025-08-02 PROBLEM — J45.909 UNSPECIFIED ASTHMA, UNCOMPLICATED: Chronic | Status: INACTIVE | Noted: 2020-10-06 | Resolved: 2025-08-01

## 2025-08-04 ENCOUNTER — APPOINTMENT (OUTPATIENT)
Dept: UROLOGY | Facility: HOSPITAL | Age: 75
End: 2025-08-04

## 2025-08-04 ENCOUNTER — OUTPATIENT (OUTPATIENT)
Dept: OUTPATIENT SERVICES | Facility: HOSPITAL | Age: 75
LOS: 1 days | End: 2025-08-04
Payer: MEDICARE

## 2025-08-04 ENCOUNTER — TRANSCRIPTION ENCOUNTER (OUTPATIENT)
Age: 75
End: 2025-08-04

## 2025-08-04 ENCOUNTER — RESULT REVIEW (OUTPATIENT)
Age: 75
End: 2025-08-04

## 2025-08-04 VITALS
RESPIRATION RATE: 21 BRPM | SYSTOLIC BLOOD PRESSURE: 144 MMHG | HEART RATE: 68 BPM | OXYGEN SATURATION: 94 % | DIASTOLIC BLOOD PRESSURE: 65 MMHG

## 2025-08-04 DIAGNOSIS — Z41.9 ENCOUNTER FOR PROCEDURE FOR PURPOSES OTHER THAN REMEDYING HEALTH STATE, UNSPECIFIED: Chronic | ICD-10-CM

## 2025-08-04 DIAGNOSIS — Z98.890 OTHER SPECIFIED POSTPROCEDURAL STATES: Chronic | ICD-10-CM

## 2025-08-04 DIAGNOSIS — Z95.1 PRESENCE OF AORTOCORONARY BYPASS GRAFT: Chronic | ICD-10-CM

## 2025-08-04 DIAGNOSIS — Z98.1 ARTHRODESIS STATUS: Chronic | ICD-10-CM

## 2025-08-04 PROCEDURE — 86900 BLOOD TYPING SEROLOGIC ABO: CPT

## 2025-08-04 PROCEDURE — 86850 RBC ANTIBODY SCREEN: CPT

## 2025-08-04 PROCEDURE — 52240 CYSTOSCOPY AND TREATMENT: CPT

## 2025-08-04 PROCEDURE — 51720 TREATMENT OF BLADDER LESION: CPT | Mod: XS

## 2025-08-04 PROCEDURE — 86901 BLOOD TYPING SEROLOGIC RH(D): CPT

## 2025-08-04 PROCEDURE — C9399: CPT

## 2025-08-04 PROCEDURE — 88307 TISSUE EXAM BY PATHOLOGIST: CPT | Mod: 26

## 2025-08-04 PROCEDURE — 52235 CYSTOSCOPY AND TREATMENT: CPT

## 2025-08-04 PROCEDURE — 88307 TISSUE EXAM BY PATHOLOGIST: CPT

## 2025-08-04 DEVICE — URETERAL STENT CONTOUR 6FR 22CM
Type: IMPLANTABLE DEVICE | Status: NON-FUNCTIONAL
Removed: 2025-08-04

## 2025-08-04 DEVICE — URETERAL STENT CONTOUR 6FR 24CM
Type: IMPLANTABLE DEVICE | Status: NON-FUNCTIONAL
Removed: 2025-08-04

## 2025-08-04 DEVICE — URETERAL CATH OPEN END FLEXI-TIP 5FR .038" X 70CM
Type: IMPLANTABLE DEVICE | Status: NON-FUNCTIONAL
Removed: 2025-08-04

## 2025-08-04 RX ORDER — PHENAZOPYRIDINE HCL 100 MG
1 TABLET ORAL
Qty: 1 | Refills: 0
Start: 2025-08-04 | End: 2025-08-06

## 2025-08-04 RX ORDER — CELECOXIB 50 MG/1
1 CAPSULE ORAL
Refills: 0 | DISCHARGE

## 2025-08-04 RX ORDER — PHENAZOPYRIDINE HCL 100 MG
100 TABLET ORAL ONCE
Refills: 0 | Status: DISCONTINUED | OUTPATIENT
Start: 2025-08-04 | End: 2025-08-04

## 2025-08-04 RX ORDER — IRBESARTAN 75 MG/1
1 TABLET ORAL
Refills: 0 | DISCHARGE

## 2025-08-04 RX ORDER — PHENAZOPYRIDINE HCL 100 MG
1 TABLET ORAL
Qty: 6 | Refills: 0
Start: 2025-08-04 | End: 2025-08-05

## 2025-08-04 RX ORDER — GEMCITABINE HYDROCHLORIDE 38 MG/ML
2000 INJECTION, SOLUTION INTRAVENOUS ONCE
Refills: 0 | Status: DISCONTINUED | OUTPATIENT
Start: 2025-08-04 | End: 2025-08-04

## 2025-08-04 RX ORDER — DULOXETINE 20 MG/1
1 CAPSULE, DELAYED RELEASE ORAL
Refills: 0 | DISCHARGE

## 2025-08-04 RX ORDER — RIVASTIGMINE 13.3 MG/24H
1 PATCH, EXTENDED RELEASE TRANSDERMAL
Refills: 0 | DISCHARGE

## 2025-08-04 RX ORDER — CILOSTAZOL 50 MG/1
1 TABLET ORAL
Refills: 0 | DISCHARGE

## 2025-08-04 RX ORDER — PIMAVANSERIN TARTRATE 34 MG/1
1 CAPSULE ORAL
Refills: 0 | DISCHARGE

## 2025-08-06 PROBLEM — G31.83 NEUROCOGNITIVE DISORDER WITH LEWY BODIES: Chronic | Status: ACTIVE | Noted: 2025-08-01

## 2025-08-06 PROBLEM — B19.10 UNSPECIFIED VIRAL HEPATITIS B WITHOUT HEPATIC COMA: Chronic | Status: ACTIVE | Noted: 2025-07-18

## 2025-08-07 ENCOUNTER — APPOINTMENT (OUTPATIENT)
Dept: UROLOGY | Facility: CLINIC | Age: 75
End: 2025-08-07
Payer: MEDICARE

## 2025-08-07 VITALS
DIASTOLIC BLOOD PRESSURE: 75 MMHG | HEIGHT: 68 IN | WEIGHT: 182 LBS | BODY MASS INDEX: 27.58 KG/M2 | SYSTOLIC BLOOD PRESSURE: 140 MMHG

## 2025-08-07 DIAGNOSIS — C67.9 MALIGNANT NEOPLASM OF BLADDER, UNSPECIFIED: ICD-10-CM

## 2025-08-07 PROCEDURE — 99024 POSTOP FOLLOW-UP VISIT: CPT

## 2025-08-07 PROCEDURE — 51798 US URINE CAPACITY MEASURE: CPT

## 2025-08-07 PROCEDURE — 51700 IRRIGATION OF BLADDER: CPT

## 2025-08-07 PROCEDURE — A4216: CPT | Mod: NC

## (undated) DEVICE — VENODYNE/SCD SLEEVE CALF MEDIUM

## (undated) DEVICE — ELCTR PLASMA BUTTON 24FR 12-30 DEG

## (undated) DEVICE — DRAINAGE BAG URINARY 2L

## (undated) DEVICE — DRAPE C ARM 41X74"

## (undated) DEVICE — POSITIONER FOAM EGG CRATE ULNAR 2PCS (PINK)

## (undated) DEVICE — ELCTR PLASMA LOOP MEDIUM ANGLED 24FR 12-30 DEG

## (undated) DEVICE — CLIPPER BLADE GENERAL USE

## (undated) DEVICE — Device

## (undated) DEVICE — PACK CYSTO

## (undated) DEVICE — TUBING RANGER FLUID IRRIGATION SET DISP

## (undated) DEVICE — GOWN ROYAL SILK XL